# Patient Record
Sex: FEMALE | Race: WHITE | NOT HISPANIC OR LATINO | Employment: FULL TIME | URBAN - METROPOLITAN AREA
[De-identification: names, ages, dates, MRNs, and addresses within clinical notes are randomized per-mention and may not be internally consistent; named-entity substitution may affect disease eponyms.]

---

## 2017-01-04 ENCOUNTER — TELEPHONE (OUTPATIENT)
Dept: INTERNAL MEDICINE | Facility: CLINIC | Age: 52
End: 2017-01-04

## 2017-01-11 ENCOUNTER — TELEPHONE (OUTPATIENT)
Dept: INTERNAL MEDICINE | Facility: CLINIC | Age: 52
End: 2017-01-11

## 2017-01-11 NOTE — TELEPHONE ENCOUNTER
----- Message from Thea Brumfield sent at 1/11/2017  8:13 AM EST -----  Concerning referrals for patient for gyn, derm and colonoscopy. I have been unable to contact pt concerning this to schedule any of these. I have left numerous voice mails and mailed letters. Are these referrals okay to cancel until pt contacts us back?

## 2017-06-05 ENCOUNTER — OFFICE VISIT (OUTPATIENT)
Dept: INTERNAL MEDICINE | Facility: CLINIC | Age: 52
End: 2017-06-05

## 2017-06-05 VITALS
DIASTOLIC BLOOD PRESSURE: 80 MMHG | RESPIRATION RATE: 16 BRPM | SYSTOLIC BLOOD PRESSURE: 122 MMHG | BODY MASS INDEX: 37.73 KG/M2 | HEART RATE: 72 BPM | TEMPERATURE: 97.7 F | WEIGHT: 190 LBS

## 2017-06-05 DIAGNOSIS — Z00.00 HEALTH CARE MAINTENANCE: ICD-10-CM

## 2017-06-05 DIAGNOSIS — C44.90 NONMELANOMA SKIN CANCER: ICD-10-CM

## 2017-06-05 DIAGNOSIS — Z12.11 SCREENING FOR COLON CANCER: ICD-10-CM

## 2017-06-05 DIAGNOSIS — E66.9 OBESITY, UNSPECIFIED OBESITY SEVERITY, UNSPECIFIED OBESITY TYPE: ICD-10-CM

## 2017-06-05 DIAGNOSIS — G47.33 OSA (OBSTRUCTIVE SLEEP APNEA): ICD-10-CM

## 2017-06-05 DIAGNOSIS — Z12.31 ENCOUNTER FOR SCREENING MAMMOGRAM FOR BREAST CANCER: ICD-10-CM

## 2017-06-05 DIAGNOSIS — I10 ESSENTIAL HYPERTENSION: ICD-10-CM

## 2017-06-05 DIAGNOSIS — R06.83 SNORING: ICD-10-CM

## 2017-06-05 DIAGNOSIS — N91.2 AMENORRHEA: ICD-10-CM

## 2017-06-05 DIAGNOSIS — Z00.00 ANNUAL PHYSICAL EXAM: Primary | ICD-10-CM

## 2017-06-05 DIAGNOSIS — R51.9 HEADACHE, UNSPECIFIED HEADACHE TYPE: ICD-10-CM

## 2017-06-05 LAB
ALBUMIN SERPL-MCNC: 4.2 G/DL (ref 3.2–4.8)
ALBUMIN/GLOB SERPL: 1.4 G/DL (ref 1.5–2.5)
ALP SERPL-CCNC: 92 U/L (ref 25–100)
ALT SERPL W P-5'-P-CCNC: 45 U/L (ref 7–40)
ANION GAP SERPL CALCULATED.3IONS-SCNC: 3 MMOL/L (ref 3–11)
ARTICHOKE IGE QN: 101 MG/DL (ref 0–130)
AST SERPL-CCNC: 27 U/L (ref 0–33)
BASOPHILS # BLD AUTO: 0.04 10*3/MM3 (ref 0–0.2)
BASOPHILS NFR BLD AUTO: 0.5 % (ref 0–1)
BILIRUB BLD-MCNC: NEGATIVE MG/DL
BILIRUB SERPL-MCNC: 0.6 MG/DL (ref 0.3–1.2)
BUN BLD-MCNC: 10 MG/DL (ref 9–23)
BUN/CREAT SERPL: 16.7 (ref 7–25)
CALCIUM SPEC-SCNC: 9.6 MG/DL (ref 8.7–10.4)
CHLORIDE SERPL-SCNC: 107 MMOL/L (ref 99–109)
CHOLEST SERPL-MCNC: 177 MG/DL (ref 0–200)
CLARITY, POC: CLEAR
CO2 SERPL-SCNC: 32 MMOL/L (ref 20–31)
COLOR UR: YELLOW
CREAT BLD-MCNC: 0.6 MG/DL (ref 0.6–1.3)
DEPRECATED RDW RBC AUTO: 43.4 FL (ref 37–54)
EOSINOPHIL # BLD AUTO: 0.12 10*3/MM3 (ref 0.1–0.3)
EOSINOPHIL NFR BLD AUTO: 1.5 % (ref 0–3)
ERYTHROCYTE [DISTWIDTH] IN BLOOD BY AUTOMATED COUNT: 13.3 % (ref 11.3–14.5)
EXPIRATION DATE: NORMAL
GFR SERPL CREATININE-BSD FRML MDRD: 105 ML/MIN/1.73
GLOBULIN UR ELPH-MCNC: 2.9 GM/DL
GLUCOSE BLD-MCNC: 85 MG/DL (ref 70–100)
GLUCOSE UR STRIP-MCNC: NEGATIVE MG/DL
HCT VFR BLD AUTO: 44.4 % (ref 34.5–44)
HDLC SERPL-MCNC: 52 MG/DL (ref 40–60)
HGB BLD-MCNC: 15 G/DL (ref 11.5–15.5)
IMM GRANULOCYTES # BLD: 0.01 10*3/MM3 (ref 0–0.03)
IMM GRANULOCYTES NFR BLD: 0.1 % (ref 0–0.6)
KETONES UR QL: NEGATIVE
LEUKOCYTE EST, POC: NEGATIVE
LYMPHOCYTES # BLD AUTO: 2.58 10*3/MM3 (ref 0.6–4.8)
LYMPHOCYTES NFR BLD AUTO: 32.8 % (ref 24–44)
Lab: NORMAL
MCH RBC QN AUTO: 30.4 PG (ref 27–31)
MCHC RBC AUTO-ENTMCNC: 33.8 G/DL (ref 32–36)
MCV RBC AUTO: 89.9 FL (ref 80–99)
MONOCYTES # BLD AUTO: 0.65 10*3/MM3 (ref 0–1)
MONOCYTES NFR BLD AUTO: 8.3 % (ref 0–12)
NEUTROPHILS # BLD AUTO: 4.46 10*3/MM3 (ref 1.5–8.3)
NEUTROPHILS NFR BLD AUTO: 56.8 % (ref 41–71)
NITRITE UR-MCNC: NEGATIVE MG/ML
PH UR: 7 [PH] (ref 5–8)
PLATELET # BLD AUTO: 250 10*3/MM3 (ref 150–450)
PMV BLD AUTO: 9.6 FL (ref 6–12)
POTASSIUM BLD-SCNC: 3.7 MMOL/L (ref 3.5–5.5)
PROT SERPL-MCNC: 7.1 G/DL (ref 5.7–8.2)
PROT UR STRIP-MCNC: NEGATIVE MG/DL
RBC # BLD AUTO: 4.94 10*6/MM3 (ref 3.89–5.14)
RBC # UR STRIP: NEGATIVE /UL
SODIUM BLD-SCNC: 142 MMOL/L (ref 132–146)
SP GR UR: 1 (ref 1–1.03)
TRIGL SERPL-MCNC: 105 MG/DL (ref 0–150)
UROBILINOGEN UR QL: NORMAL
WBC NRBC COR # BLD: 7.86 10*3/MM3 (ref 3.5–10.8)

## 2017-06-05 PROCEDURE — 85025 COMPLETE CBC W/AUTO DIFF WBC: CPT | Performed by: NURSE PRACTITIONER

## 2017-06-05 PROCEDURE — 81003 URINALYSIS AUTO W/O SCOPE: CPT | Performed by: NURSE PRACTITIONER

## 2017-06-05 PROCEDURE — 99396 PREV VISIT EST AGE 40-64: CPT | Performed by: NURSE PRACTITIONER

## 2017-06-05 PROCEDURE — 80061 LIPID PANEL: CPT | Performed by: NURSE PRACTITIONER

## 2017-06-05 PROCEDURE — 80053 COMPREHEN METABOLIC PANEL: CPT | Performed by: NURSE PRACTITIONER

## 2017-06-05 PROCEDURE — 36415 COLL VENOUS BLD VENIPUNCTURE: CPT | Performed by: NURSE PRACTITIONER

## 2017-06-05 RX ORDER — LOSARTAN POTASSIUM AND HYDROCHLOROTHIAZIDE 12.5; 5 MG/1; MG/1
1 TABLET ORAL DAILY
Qty: 30 TABLET | Refills: 5 | Status: SHIPPED | OUTPATIENT
Start: 2017-06-05 | End: 2018-01-21 | Stop reason: SDUPTHER

## 2017-06-05 NOTE — PATIENT INSTRUCTIONS
MyPlate from [employer]  The general, healthful diet is based on the 2010 Dietary Guidelines for Americans. The amount of food you need to eat from each food group depends on your age, sex, and level of physical activity and can be individualized by a dietitian. Go to ChooseMyPlate.gov for more information.  WHAT DO I NEED TO KNOW ABOUT THE MYPLATE PLAN?  · Enjoy your food, but eat less.    · Avoid oversized portions.      ½ of your plate should include fruits and vegetables.    ¼ of your plate should be grains.    ¼ of your plate should be protein.  Grains  · Make at least half of your grains whole grains.  · For a 2,000 calorie daily food plan, eat 6 oz every day.  · 1 oz is about 1 slice bread, 1 cup cereal, or ½ cup cooked rice, cereal, or pasta.  Vegetables  · Make half your plate fruits and vegetables.  · For a 2,000 calorie daily food plan, eat 2½ cups every day.  · 1 cup is about 1 cup raw or cooked vegetables or vegetable juice or 2 cups raw leafy greens.  Fruits  · Make half your plate fruits and vegetables.  · For a 2,000 calorie daily food plan, eat 2 cups every day.  · 1 cup is about 1 cup fruit or 100% fruit juice or ½ cup dried fruit.  Protein  · For a 2,000 calorie daily food plan, eat 5½ oz every day.  · 1 oz is about 1 oz meat, poultry, or fish, ¼ cup cooked beans, 1 egg, 1 Tbsp peanut butter, or ½ oz nuts or seeds.  Dairy  · Switch to fat-free or low-fat (1%) milk.  · For a 2,000 calorie daily food plan, eat 3 cups every day.  · 1 cup is about 1 cup milk or yogurt or soy milk (soy beverage), 1½ oz natural cheese, or 2 oz processed cheese.  Fats, Oils, and Empty Calories  · Only small amounts of oils are recommended.  · Empty calories are calories from solid fats or added sugars.  · Compare sodium in foods like soup, bread, and frozen meals. Choose the foods with lower numbers.  · Drink water instead of sugary drinks.  WHAT FOODS CAN I EAT?  Grains  Whole grains such as whole wheat, quinoa, millet, and  bulgur. Bread, rolls, and pasta made from whole grains. Brown or wild rice. Hot or cold cereals made from whole grains and without added sugar.  Vegetables  All fresh vegetables, especially fresh red, dark green, or orange vegetables. Peas and beans. Low-sodium frozen or canned vegetables prepared without added salt. Low-sodium vegetable juices.  Fruits  All fresh, frozen, and dried fruits. Canned fruit packed in water or fruit juice without added sugar. Fruit juices without added sugar.  Meats and Other Protein Sources  Boiled, baked, or grilled lean meat trimmed of fat. Skinless poultry. Fresh seafood and shellfish. Canned seafood packed in water. Unsalted nuts and unsalted nut butters. Tofu. Dried beans and pea. Eggs.  Dairy  Low-fat or fat-free milk, yogurt, and cheeses.   Sweets and Desserts  Frozen desserts made from low-fat milk.  Fats and Oils  Olive, peanut, and canola oils and margarine. Salad dressing and mayonnaise made from these oils.  Other  Soups and casseroles made from allowed ingredients and without added fat or salt.  The items listed above may not be a complete list of recommended foods or beverages. Contact your dietitian for more options.   WHAT FOODS ARE NOT RECOMMENDED?  Grains  Sweetened, low-fiber cereals. Packaged baked goods. Snack crackers and chips. Cheese crackers, butter crackers, and biscuits. Frozen waffles, sweet breads, doughnuts, pastries, packaged baking mixes, pancakes, cakes, and cookies.  Vegetables  Regular canned or frozen vegetables or vegetables prepared with salt. Canned tomatoes. Canned tomato sauce. Fried vegetables. Vegetables in cream sauce or cheese sauce.  Fruits  Fruits packed in syrup or made with added sugar.   Meats and Other Protein Sources  Marbled or fatty meats such as ribs. Poultry with skin. Fried meats, poultry, eggs, or fish. Sausages, hot dogs, and deli meats such as pastrami, bologna, or salami.  Dairy  Whole milk, cream, cheeses made from whole  "milk, sour cream. Ice cream or yogurt made from whole milk or with added sugar.  Beverages  For adults, no more than one alcoholic drink per day. Regular soft drinks or other sugary beverages. Juice drinks.  Sweets and Desserts  Sugary or fatty desserts, candy, and other sweets.  Fats and Oils  Solid shortening or partially hydrogenated oils. Solid margarine. Margarine that contains trans fats. Butter.  The items listed above may not be a complete list of foods and beverages to avoid. Contact your dietitian for more information.     This information is not intended to replace advice given to you by your health care provider. Make sure you discuss any questions you have with your health care provider.     Document Released: 01/06/2009 Document Revised: 01/08/2016 Document Reviewed: 11/26/2014  [employer] Interactive Patient Education ©2017 [employer]      DASH Eating Plan  DASH stands for \"Dietary Approaches to Stop Hypertension.\" The DASH eating plan is a healthy eating plan that has been shown to reduce high blood pressure (hypertension). Additional health benefits may include reducing the risk of type 2 diabetes mellitus, heart disease, and stroke. The DASH eating plan may also help with weight loss.  WHAT DO I NEED TO KNOW ABOUT THE DASH EATING PLAN?  For the DASH eating plan, you will follow these general guidelines:  · Choose foods with less than 150 milligrams of sodium per serving (as listed on the food label).  · Use salt-free seasonings or herbs instead of table salt or sea salt.  · Check with your health care provider or pharmacist before using salt substitutes.  · Eat lower-sodium products. These are often labeled as \"low-sodium\" or \"no salt added.\"  · Eat fresh foods. Avoid eating a lot of canned foods.  · Eat more vegetables, fruits, and low-fat dairy products.  · Choose whole grains. Look for the word \"whole\" as the first word in the ingredient list.  · Choose fish and skinless chicken or turkey more " often than red meat. Limit fish, poultry, and meat to 6 oz (170 g) each day.  · Limit sweets, desserts, sugars, and sugary drinks.  · Choose heart-healthy fats.  · Eat more home-cooked food and less restaurant, buffet, and fast food.  · Limit fried foods.  · Do not bonilla foods. Cook foods using methods such as baking, boiling, grilling, and broiling instead.  · When eating at a restaurant, ask that your food be prepared with less salt, or no salt if possible.  WHAT FOODS CAN I EAT?  Seek help from a dietitian for individual calorie needs.  Grains  Whole grain or whole wheat bread. Brown rice. Whole grain or whole wheat pasta. Quinoa, bulgur, and whole grain cereals. Low-sodium cereals. Corn or whole wheat flour tortillas. Whole grain cornbread. Whole grain crackers. Low-sodium crackers.  Vegetables  Fresh or frozen vegetables (raw, steamed, roasted, or grilled). Low-sodium or reduced-sodium tomato and vegetable juices. Low-sodium or reduced-sodium tomato sauce and paste. Low-sodium or reduced-sodium canned vegetables.   Fruits  All fresh, canned (in natural juice), or frozen fruits.  Meat and Other Protein Products  Ground beef (85% or leaner), grass-fed beef, or beef trimmed of fat. Skinless chicken or turkey. Ground chicken or turkey. Pork trimmed of fat. All fish and seafood. Eggs. Dried beans, peas, or lentils. Unsalted nuts and seeds. Unsalted canned beans.  Dairy  Low-fat dairy products, such as skim or 1% milk, 2% or reduced-fat cheeses, low-fat ricotta or cottage cheese, or plain low-fat yogurt. Low-sodium or reduced-sodium cheeses.  Fats and Oils  Tub margarines without trans fats. Light or reduced-fat mayonnaise and salad dressings (reduced sodium). Avocado. Safflower, olive, or canola oils. Natural peanut or almond butter.  Other  Unsalted popcorn and pretzels.  The items listed above may not be a complete list of recommended foods or beverages. Contact your dietitian for more options.  WHAT FOODS ARE NOT  RECOMMENDED?  Grains  White bread. White pasta. White rice. Refined cornbread. Bagels and croissants. Crackers that contain trans fat.  Vegetables  Creamed or fried vegetables. Vegetables in a cheese sauce. Regular canned vegetables. Regular canned tomato sauce and paste. Regular tomato and vegetable juices.  Fruits  Canned fruit in light or heavy syrup. Fruit juice.  Meat and Other Protein Products  Fatty cuts of meat. Ribs, chicken wings, davila, sausage, bologna, salami, chitterlings, fatback, hot dogs, bratwurst, and packaged luncheon meats. Salted nuts and seeds. Canned beans with salt.  Dairy  Whole or 2% milk, cream, half-and-half, and cream cheese. Whole-fat or sweetened yogurt. Full-fat cheeses or blue cheese. Nondairy creamers and whipped toppings. Processed cheese, cheese spreads, or cheese curds.  Condiments  Onion and garlic salt, seasoned salt, table salt, and sea salt. Canned and packaged gravies. Worcestershire sauce. Tartar sauce. Barbecue sauce. Teriyaki sauce. Soy sauce, including reduced sodium. Steak sauce. Fish sauce. Oyster sauce. Cocktail sauce. Horseradish. Ketchup and mustard. Meat flavorings and tenderizers. Bouillon cubes. Hot sauce. Tabasco sauce. Marinades. Taco seasonings. Relishes.  Fats and Oils  Butter, stick margarine, lard, shortening, ghee, and davila fat. Coconut, palm kernel, or palm oils. Regular salad dressings.  Other  Pickles and olives. Salted popcorn and pretzels.  The items listed above may not be a complete list of foods and beverages to avoid. Contact your dietitian for more information.  WHERE CAN I FIND MORE INFORMATION?  [employer] [employer] [facility] [place]: www.nhlbi.nih.gov/health/health-topics/topics/dash/     This information is not intended to replace advice given to you by your health care provider. Make sure you discuss any questions you have with your health care provider.     Document Released: 12/06/2012 Document Revised: 04/10/2017 Document Reviewed:  10/22/2014  [employer] Interactive Patient Education ©2017 [employer]vier Inc.

## 2017-06-05 NOTE — PROGRESS NOTES
Chief Complaint   Patient presents with   • Hypertension        Subjective     History of Present Illness     Not watching diet  Joined [employer] recently  Not checking bp  Did not get colon or mamm last visit no reason just did not  Mom had liver melanoma then to eye      The following portions of the patient's history were reviewed and updated as appropriate: allergies, current medications, past family history, past medical history, past social history, past surgical history and problem list.    Review of Systems   Constitutional: Negative for activity change, appetite change, fever and unexpected weight change.   All other systems reviewed and are negative.  Headache dizziness visual changes chest pain shortness of breath swelling no abdominal pain nausea vomiting diarrhea no allergy symptoms such as congestion no difficulty urination bowel movements are daily no arthralgia sleep is stable    Objective   Physical Exam   Constitutional: She appears well-developed and well-nourished.   HENT:   Head: Normocephalic and atraumatic.   Right Ear: External ear normal.   Left Ear: External ear normal.   Nose: Nose normal.   Mouth/Throat: Oropharynx is clear and moist.   Eyes: Conjunctivae and EOM are normal. Pupils are equal, round, and reactive to light. Right eye exhibits no discharge. Left eye exhibits no discharge. No scleral icterus.   Neck: Normal range of motion. Neck supple. No thyromegaly present.   Cardiovascular: Normal rate, regular rhythm, normal heart sounds and intact distal pulses.    No murmur heard.  Pulmonary/Chest: Effort normal and breath sounds normal.   Abdominal: Soft. Bowel sounds are normal. She exhibits no distension and no mass. There is no tenderness. There is no rebound and no guarding. No hernia.   Musculoskeletal: Normal range of motion. She exhibits no edema.   Lymphadenopathy:     She has no cervical adenopathy.   Neurological: She is alert. She has normal reflexes.   Skin: Skin is  warm and dry.   Psychiatric: She has a normal mood and affect. Her behavior is normal.   Nursing note and vitals reviewed.        Current Outpatient Prescriptions:   •  losartan-hydrochlorothiazide (HYZAAR) 50-12.5 MG per tablet, Take 1 tablet by mouth Daily., Disp: 30 tablet, Rfl: 5  •  Multiple Vitamins-Minerals (MULTIVITAMIN ADULT PO), Take  by mouth., Disp: , Rfl:     Assessment/Plan   Natalie was seen today for hypertension.    Diagnoses and all orders for this visit:    Essential hypertension  -     losartan-hydrochlorothiazide (HYZAAR) 50-12.5 MG per tablet; Take 1 tablet by mouth Daily.  -     POC Urinalysis Dipstick, Automated  -     Comprehensive Metabolic Panel    Snoring    SHY (obstructive sleep apnea)    Headache, unspecified headache type    Nonmelanoma skin cancer    Amenorrhea    Health care maintenance    Obesity, unspecified obesity severity, unspecified obesity type  -     Lipid Panel    Encounter for screening mammogram for breast cancer  -     Mammo Screening Digital Tomosynthesis Bilateral With CAD; Future    Screening for colon cancer  -     Ambulatory Referral For Screening Colonoscopy    Annual physical exam  -     CBC & Differential    Other orders  -     Cancel: TSH    no pmh of abnormal paps     lmp 10/16    H/m  Breast colon screenings    Patient education regarding the importance of avoidance of texting while driving and the need for wearing seatbelt.  Use of sunscreen, use of helmets while on bikes.  The appropriate amounts of sleep and H20 consumption per day was reviewed with pt.  The need for healthy well-balanced diet based off the food guide pyramid and avoidance of skipping meals along with following a NCS diet with appropriate amounts of fats, protein, and carbohydrate and low sodium diet. Encouraging 30minutes of cardio 5d weekly and muscle strengthening 3x weekly.     Follow up 6 mo fasting chronic f/u  RTC/call  If symptoms worsen  Meds MOA and SE's reviewed and pt  v/u

## 2017-06-06 ENCOUNTER — TRANSCRIBE ORDERS (OUTPATIENT)
Dept: ADMINISTRATIVE | Facility: HOSPITAL | Age: 52
End: 2017-06-06

## 2017-06-06 DIAGNOSIS — Z12.31 VISIT FOR SCREENING MAMMOGRAM: Primary | ICD-10-CM

## 2017-06-07 ENCOUNTER — HOSPITAL ENCOUNTER (OUTPATIENT)
Dept: MAMMOGRAPHY | Facility: HOSPITAL | Age: 52
Discharge: HOME | End: 2017-06-07
Admitting: NURSE PRACTITIONER

## 2017-06-07 ENCOUNTER — APPOINTMENT (OUTPATIENT)
Dept: OTHER | Facility: HOSPITAL | Age: 52
End: 2017-06-07

## 2017-06-07 DIAGNOSIS — Z92.89 H/O MAMMOGRAM: ICD-10-CM

## 2017-06-07 DIAGNOSIS — Z12.31 ENCOUNTER FOR SCREENING MAMMOGRAM FOR BREAST CANCER: ICD-10-CM

## 2017-06-07 PROCEDURE — 77067 SCR MAMMO BI INCL CAD: CPT | Performed by: RADIOLOGY

## 2017-06-07 PROCEDURE — 77063 BREAST TOMOSYNTHESIS BI: CPT

## 2017-06-07 PROCEDURE — G0202 SCR MAMMO BI INCL CAD: HCPCS

## 2017-06-07 PROCEDURE — 77063 BREAST TOMOSYNTHESIS BI: CPT | Performed by: RADIOLOGY

## 2017-06-30 ENCOUNTER — TELEPHONE (OUTPATIENT)
Dept: INTERNAL MEDICINE | Facility: CLINIC | Age: 52
End: 2017-06-30

## 2017-06-30 NOTE — TELEPHONE ENCOUNTER
----- Message from LISA Gómez sent at 6/21/2017  7:24 AM EDT -----  Pap nml repeat in 3 yr let pt know

## 2017-08-22 ENCOUNTER — TELEPHONE (OUTPATIENT)
Dept: INTERNAL MEDICINE | Facility: CLINIC | Age: 52
End: 2017-08-22

## 2017-12-13 ENCOUNTER — OFFICE VISIT (OUTPATIENT)
Dept: INTERNAL MEDICINE | Facility: CLINIC | Age: 52
End: 2017-12-13

## 2017-12-13 VITALS
BODY MASS INDEX: 36.71 KG/M2 | DIASTOLIC BLOOD PRESSURE: 76 MMHG | SYSTOLIC BLOOD PRESSURE: 112 MMHG | HEIGHT: 60 IN | HEART RATE: 70 BPM | TEMPERATURE: 98.5 F | WEIGHT: 187 LBS

## 2017-12-13 DIAGNOSIS — R06.83 SNORING: ICD-10-CM

## 2017-12-13 DIAGNOSIS — I10 ESSENTIAL HYPERTENSION: Primary | ICD-10-CM

## 2017-12-13 DIAGNOSIS — E66.9 OBESITY (BMI 35.0-39.9 WITHOUT COMORBIDITY): ICD-10-CM

## 2017-12-13 DIAGNOSIS — C44.90 NONMELANOMA SKIN CANCER: ICD-10-CM

## 2017-12-13 DIAGNOSIS — J40 BRONCHITIS: ICD-10-CM

## 2017-12-13 DIAGNOSIS — N91.2 AMENORRHEA: ICD-10-CM

## 2017-12-13 DIAGNOSIS — G89.29 CHRONIC INTRACTABLE HEADACHE, UNSPECIFIED HEADACHE TYPE: ICD-10-CM

## 2017-12-13 DIAGNOSIS — R51.9 CHRONIC INTRACTABLE HEADACHE, UNSPECIFIED HEADACHE TYPE: ICD-10-CM

## 2017-12-13 DIAGNOSIS — G47.33 OSA (OBSTRUCTIVE SLEEP APNEA): ICD-10-CM

## 2017-12-13 LAB
ALBUMIN SERPL-MCNC: 4.2 G/DL (ref 3.2–4.8)
ALBUMIN/GLOB SERPL: 1.6 G/DL (ref 1.5–2.5)
ALP SERPL-CCNC: 100 U/L (ref 25–100)
ALT SERPL W P-5'-P-CCNC: 43 U/L (ref 7–40)
ANION GAP SERPL CALCULATED.3IONS-SCNC: 5 MMOL/L (ref 3–11)
ARTICHOKE IGE QN: 86 MG/DL (ref 0–130)
AST SERPL-CCNC: 28 U/L (ref 0–33)
BILIRUB BLD-MCNC: NEGATIVE MG/DL
BILIRUB SERPL-MCNC: 0.7 MG/DL (ref 0.3–1.2)
BUN BLD-MCNC: 9 MG/DL (ref 9–23)
BUN/CREAT SERPL: 15 (ref 7–25)
CALCIUM SPEC-SCNC: 9.3 MG/DL (ref 8.7–10.4)
CHLORIDE SERPL-SCNC: 104 MMOL/L (ref 99–109)
CHOLEST SERPL-MCNC: 150 MG/DL (ref 0–200)
CLARITY, POC: CLEAR
CO2 SERPL-SCNC: 30 MMOL/L (ref 20–31)
COLOR UR: YELLOW
CREAT BLD-MCNC: 0.6 MG/DL (ref 0.6–1.3)
DEPRECATED RDW RBC AUTO: 42.1 FL (ref 37–54)
ERYTHROCYTE [DISTWIDTH] IN BLOOD BY AUTOMATED COUNT: 13.1 % (ref 11.3–14.5)
EXPIRATION DATE: NORMAL
GFR SERPL CREATININE-BSD FRML MDRD: 105 ML/MIN/1.73
GLOBULIN UR ELPH-MCNC: 2.6 GM/DL
GLUCOSE BLD-MCNC: 88 MG/DL (ref 70–100)
GLUCOSE UR STRIP-MCNC: NEGATIVE MG/DL
HCT VFR BLD AUTO: 43.8 % (ref 34.5–44)
HDLC SERPL-MCNC: 56 MG/DL (ref 40–60)
HGB BLD-MCNC: 15.2 G/DL (ref 11.5–15.5)
KETONES UR QL: NEGATIVE
LEUKOCYTE EST, POC: NEGATIVE
Lab: NORMAL
MCH RBC QN AUTO: 30.8 PG (ref 27–31)
MCHC RBC AUTO-ENTMCNC: 34.7 G/DL (ref 32–36)
MCV RBC AUTO: 88.7 FL (ref 80–99)
NITRITE UR-MCNC: NEGATIVE MG/ML
PH UR: 8 [PH] (ref 5–8)
PLATELET # BLD AUTO: 241 10*3/MM3 (ref 150–450)
PMV BLD AUTO: 9.1 FL (ref 6–12)
POTASSIUM BLD-SCNC: 3.8 MMOL/L (ref 3.5–5.5)
PROT SERPL-MCNC: 6.8 G/DL (ref 5.7–8.2)
PROT UR STRIP-MCNC: NEGATIVE MG/DL
RBC # BLD AUTO: 4.94 10*6/MM3 (ref 3.89–5.14)
RBC # UR STRIP: NEGATIVE /UL
SODIUM BLD-SCNC: 139 MMOL/L (ref 132–146)
SP GR UR: 1.01 (ref 1–1.03)
TRIGL SERPL-MCNC: 84 MG/DL (ref 0–150)
TSH SERPL DL<=0.05 MIU/L-ACNC: 0.98 MIU/ML (ref 0.35–5.35)
UROBILINOGEN UR QL: NORMAL
WBC NRBC COR # BLD: 7.74 10*3/MM3 (ref 3.5–10.8)

## 2017-12-13 PROCEDURE — 84443 ASSAY THYROID STIM HORMONE: CPT | Performed by: NURSE PRACTITIONER

## 2017-12-13 PROCEDURE — 80061 LIPID PANEL: CPT | Performed by: NURSE PRACTITIONER

## 2017-12-13 PROCEDURE — 99214 OFFICE O/P EST MOD 30 MIN: CPT | Performed by: NURSE PRACTITIONER

## 2017-12-13 PROCEDURE — 81003 URINALYSIS AUTO W/O SCOPE: CPT | Performed by: NURSE PRACTITIONER

## 2017-12-13 PROCEDURE — 85027 COMPLETE CBC AUTOMATED: CPT | Performed by: NURSE PRACTITIONER

## 2017-12-13 PROCEDURE — 36415 COLL VENOUS BLD VENIPUNCTURE: CPT | Performed by: NURSE PRACTITIONER

## 2017-12-13 PROCEDURE — 80053 COMPREHEN METABOLIC PANEL: CPT | Performed by: NURSE PRACTITIONER

## 2017-12-13 RX ORDER — BENZONATATE 100 MG/1
100 CAPSULE ORAL 3 TIMES DAILY PRN
Qty: 30 CAPSULE | Refills: 0 | Status: SHIPPED | OUTPATIENT
Start: 2017-12-13 | End: 2018-03-09

## 2017-12-13 RX ORDER — AZITHROMYCIN 250 MG/1
TABLET, FILM COATED ORAL
Qty: 6 TABLET | Refills: 0 | Status: SHIPPED | OUTPATIENT
Start: 2017-12-13 | End: 2018-03-09

## 2017-12-13 NOTE — PROGRESS NOTES
Chief Complaint   Patient presents with   • Follow-up     6 Month   • Cough     dry x 5 days        Subjective     History of Present Illness   6mo f/u    New problem includes Uri 1 week  sick seen neg flu   otc meds not helping     Migraine med rf sumitriptan  Needs takes 1/2 pills prn infreq  Uri cough 5 d otc meds not helping    yamilet in past- per  reports not snorming not on cpap and taking villeran root    pmh skin cancer    Menopausal one year    No exercise daily and does not follow a healthy diet    The following portions of the patient's history were reviewed and updated as appropriate: allergies, current medications, past family history, past medical history, past social history, past surgical history and problem list.    Review of Systems  No change in appetite or activity no recent fevers chills or arthralgias no sweats positive headache nasal congestion no ear pain postnasal drainage mild cough no chest pain shortness of breath no abdominal pain nausea vomiting diarrhea no changes in bowel bladder habits no new skin changes noted lumps or bumps  Objective   Physical Exam   Constitutional: She is oriented to person, place, and time. She appears well-developed and well-nourished.   HENT:   Head: Normocephalic and atraumatic.   Right Ear: Hearing, tympanic membrane, external ear and ear canal normal.   Left Ear: Hearing, tympanic membrane, external ear and ear canal normal.   Nose: Right sinus exhibits no maxillary sinus tenderness and no frontal sinus tenderness. Left sinus exhibits no maxillary sinus tenderness and no frontal sinus tenderness.   Mouth/Throat: Uvula is midline and mucous membranes are normal.   Moderate clear postnasal drainage Nasal mucosa edematous   Eyes: Conjunctivae are normal. Pupils are equal, round, and reactive to light. Right eye exhibits no discharge. Left eye exhibits no discharge. No scleral icterus.   Neck: Normal range of motion. Neck supple. No thyromegaly  present.   Cardiovascular: Normal rate, regular rhythm, normal heart sounds and intact distal pulses.  Exam reveals no friction rub.    No murmur heard.  Pulmonary/Chest: Effort normal.   Diminished in bases congested cough   Abdominal: Soft. Bowel sounds are normal. She exhibits no distension and no mass. There is no tenderness. There is no rebound and no guarding. No hernia.   Musculoskeletal: Normal range of motion. She exhibits no edema.   Lymphadenopathy:     She has no cervical adenopathy.   Neurological: She is alert and oriented to person, place, and time. She has normal reflexes.   Skin: Skin is warm and dry.   Psychiatric: She has a normal mood and affect. Her behavior is normal. Judgment and thought content normal.   Nursing note and vitals reviewed.            Assessment/Plan   Natalie was seen today for follow-up and cough.    Diagnoses and all orders for this visit:    Essential hypertension  -     Comprehensive Metabolic Panel  -     CBC (No Diff)  -     Lipid Panel  -     TSH  -     POC Urinalysis Dipstick, Automated    Snoring  -     Comprehensive Metabolic Panel  -     CBC (No Diff)  -     Lipid Panel  -     TSH  -     POC Urinalysis Dipstick, Automated    SHY (obstructive sleep apnea)  -     Comprehensive Metabolic Panel  -     CBC (No Diff)  -     Lipid Panel  -     TSH  -     POC Urinalysis Dipstick, Automated    Nonmelanoma skin cancer  -     Comprehensive Metabolic Panel  -     CBC (No Diff)  -     Lipid Panel  -     TSH  -     POC Urinalysis Dipstick, Automated    Chronic intractable headache, unspecified headache type  -     Comprehensive Metabolic Panel  -     CBC (No Diff)  -     Lipid Panel  -     TSH  -     POC Urinalysis Dipstick, Automated    Amenorrhea  -     Comprehensive Metabolic Panel  -     CBC (No Diff)  -     Lipid Panel  -     TSH  -     POC Urinalysis Dipstick, Automated    Bronchitis  -     azithromycin (ZITHROMAX Z-LUIS) 250 MG tablet; Take 2 tablets the first day, then 1  tablet daily for 4 days.  -     benzonatate (TESSALON PERLES) 100 MG capsule; Take 1 capsule by mouth 3 (Three) Times a Day As Needed for Cough.    Obesity (BMI 35.0-39.9 without comorbidity)      Counseling of diet in reagards to appropriate amounts of fats, carbs, sodium and well -balanced diet.  Avoid skipping meals, appropriate amounts of h20 intake, sleep, and regular cardio activity 30 mintues 5d weekly.  The pt v/u.        Return in about 6 months (around 6/13/2018) for print summary, Annual, fasting.  RTC/call  If symptoms worsen  Meds MOA and SE's reviewed and pt v/u

## 2017-12-13 NOTE — PATIENT INSTRUCTIONS
MyPlate from [employer]  The general, healthful diet is based on the 2010 Dietary Guidelines for Americans. The amount of food you need to eat from each food group depends on your age, sex, and level of physical activity and can be individualized by a dietitian. Go to ChooseMyPlate.gov for more information.  WHAT DO I NEED TO KNOW ABOUT THE MYPLATE PLAN?  · Enjoy your food, but eat less.    · Avoid oversized portions.      ½ of your plate should include fruits and vegetables.    ¼ of your plate should be grains.    ¼ of your plate should be protein.  Grains  · Make at least half of your grains whole grains.  · For a 2,000 calorie daily food plan, eat 6 oz every day.  · 1 oz is about 1 slice bread, 1 cup cereal, or ½ cup cooked rice, cereal, or pasta.  Vegetables  · Make half your plate fruits and vegetables.  · For a 2,000 calorie daily food plan, eat 2½ cups every day.  · 1 cup is about 1 cup raw or cooked vegetables or vegetable juice or 2 cups raw leafy greens.  Fruits  · Make half your plate fruits and vegetables.  · For a 2,000 calorie daily food plan, eat 2 cups every day.  · 1 cup is about 1 cup fruit or 100% fruit juice or ½ cup dried fruit.  Protein  · For a 2,000 calorie daily food plan, eat 5½ oz every day.  · 1 oz is about 1 oz meat, poultry, or fish, ¼ cup cooked beans, 1 egg, 1 Tbsp peanut butter, or ½ oz nuts or seeds.  Dairy  · Switch to fat-free or low-fat (1%) milk.  · For a 2,000 calorie daily food plan, eat 3 cups every day.  · 1 cup is about 1 cup milk or yogurt or soy milk (soy beverage), 1½ oz natural cheese, or 2 oz processed cheese.  Fats, Oils, and Empty Calories  · Only small amounts of oils are recommended.  · Empty calories are calories from solid fats or added sugars.  · Compare sodium in foods like soup, bread, and frozen meals. Choose the foods with lower numbers.  · Drink water instead of sugary drinks.  WHAT FOODS CAN I EAT?  Grains  Whole grains such as whole wheat, quinoa, millet, and  bulgur. Bread, rolls, and pasta made from whole grains. Brown or wild rice. Hot or cold cereals made from whole grains and without added sugar.  Vegetables  All fresh vegetables, especially fresh red, dark green, or orange vegetables. Peas and beans. Low-sodium frozen or canned vegetables prepared without added salt. Low-sodium vegetable juices.  Fruits  All fresh, frozen, and dried fruits. Canned fruit packed in water or fruit juice without added sugar. Fruit juices without added sugar.  Meats and Other Protein Sources  Boiled, baked, or grilled lean meat trimmed of fat. Skinless poultry. Fresh seafood and shellfish. Canned seafood packed in water. Unsalted nuts and unsalted nut butters. Tofu. Dried beans and pea. Eggs.  Dairy  Low-fat or fat-free milk, yogurt, and cheeses.   Sweets and Desserts  Frozen desserts made from low-fat milk.  Fats and Oils  Olive, peanut, and canola oils and margarine. Salad dressing and mayonnaise made from these oils.  Other  Soups and casseroles made from allowed ingredients and without added fat or salt.  The items listed above may not be a complete list of recommended foods or beverages. Contact your dietitian for more options.   WHAT FOODS ARE NOT RECOMMENDED?  Grains  Sweetened, low-fiber cereals. Packaged baked goods. Snack crackers and chips. Cheese crackers, butter crackers, and biscuits. Frozen waffles, sweet breads, doughnuts, pastries, packaged baking mixes, pancakes, cakes, and cookies.  Vegetables  Regular canned or frozen vegetables or vegetables prepared with salt. Canned tomatoes. Canned tomato sauce. Fried vegetables. Vegetables in cream sauce or cheese sauce.  Fruits  Fruits packed in syrup or made with added sugar.   Meats and Other Protein Sources  Marbled or fatty meats such as ribs. Poultry with skin. Fried meats, poultry, eggs, or fish. Sausages, hot dogs, and deli meats such as pastrami, bologna, or salami.  Dairy  Whole milk, cream, cheeses made from whole  milk, sour cream. Ice cream or yogurt made from whole milk or with added sugar.  Beverages  For adults, no more than one alcoholic drink per day. Regular soft drinks or other sugary beverages. Juice drinks.  Sweets and Desserts  Sugary or fatty desserts, candy, and other sweets.  Fats and Oils  Solid shortening or partially hydrogenated oils. Solid margarine. Margarine that contains trans fats. Butter.  The items listed above may not be a complete list of foods and beverages to avoid. Contact your dietitian for more information.     This information is not intended to replace advice given to you by your health care provider. Make sure you discuss any questions you have with your health care provider.     Document Released: 01/06/2009 Document Revised: 01/08/2016 Document Reviewed: 11/26/2014  [employer] Interactive Patient Education ©2017 [employer]

## 2017-12-20 PROBLEM — E66.9 OBESITY (BMI 35.0-39.9 WITHOUT COMORBIDITY): Status: ACTIVE | Noted: 2017-12-20

## 2017-12-28 ENCOUNTER — TELEPHONE (OUTPATIENT)
Dept: INTERNAL MEDICINE | Facility: CLINIC | Age: 52
End: 2017-12-28

## 2017-12-28 RX ORDER — SUMATRIPTAN 100 MG/1
TABLET, FILM COATED ORAL
Qty: 7 TABLET | Refills: 0 | Status: SHIPPED | OUTPATIENT
Start: 2017-12-28 | End: 2022-02-04 | Stop reason: SDUPTHER

## 2017-12-28 RX ORDER — SUMATRIPTAN 100 MG/1
TABLET, FILM COATED ORAL
Qty: 10 TABLET | Refills: 0 | OUTPATIENT
Start: 2017-12-28

## 2017-12-28 NOTE — TELEPHONE ENCOUNTER
----- Message from Valentine Olivas MA sent at 12/28/2017 12:05 PM EST -----  Contact: Natalie  [phone]  She saw Jania on 12/13/2017 and thought she was going to call in a rx for sumatriptan tablets for migranes.  She has this medication perscribed by another provider in the past and they helped.    She checked with the pharmacy and there is not a Rx there.  She uses [address]

## 2017-12-28 NOTE — TELEPHONE ENCOUNTER
Spoke with Pt and she states she takes 100mg prn for migraine. RX sent to [facility]. Pt verbalized much appr'c.

## 2018-01-21 DIAGNOSIS — I10 ESSENTIAL HYPERTENSION: ICD-10-CM

## 2018-01-22 RX ORDER — LOSARTAN POTASSIUM AND HYDROCHLOROTHIAZIDE 12.5; 5 MG/1; MG/1
TABLET ORAL
Qty: 30 TABLET | Refills: 5 | Status: SHIPPED | OUTPATIENT
Start: 2018-01-22 | End: 2018-07-18 | Stop reason: SDUPTHER

## 2018-03-09 ENCOUNTER — OFFICE VISIT (OUTPATIENT)
Dept: INTERNAL MEDICINE | Facility: CLINIC | Age: 53
End: 2018-03-09

## 2018-03-09 VITALS
HEART RATE: 88 BPM | DIASTOLIC BLOOD PRESSURE: 84 MMHG | SYSTOLIC BLOOD PRESSURE: 118 MMHG | TEMPERATURE: 98.6 F | RESPIRATION RATE: 16 BRPM | WEIGHT: 189 LBS | BODY MASS INDEX: 36.61 KG/M2

## 2018-03-09 DIAGNOSIS — H66.41 SUPPURATIVE OTITIS MEDIA OF RIGHT EAR, UNSPECIFIED CHRONICITY: Primary | ICD-10-CM

## 2018-03-09 PROCEDURE — 99214 OFFICE O/P EST MOD 30 MIN: CPT | Performed by: NURSE PRACTITIONER

## 2018-03-09 PROCEDURE — 96372 THER/PROPH/DIAG INJ SC/IM: CPT | Performed by: NURSE PRACTITIONER

## 2018-03-09 RX ORDER — CEFTRIAXONE 2 G/1
2 INJECTION, POWDER, FOR SOLUTION INTRAMUSCULAR; INTRAVENOUS ONCE
Status: COMPLETED | OUTPATIENT
Start: 2018-03-09 | End: 2018-03-09

## 2018-03-09 RX ORDER — AMOXICILLIN AND CLAVULANATE POTASSIUM 875; 125 MG/1; MG/1
1 TABLET, FILM COATED ORAL EVERY 12 HOURS SCHEDULED
Qty: 20 TABLET | Refills: 0 | Status: SHIPPED | OUTPATIENT
Start: 2018-03-09 | End: 2018-03-12

## 2018-03-09 RX ADMIN — CEFTRIAXONE 2 G: 2 INJECTION, POWDER, FOR SOLUTION INTRAMUSCULAR; INTRAVENOUS at 14:15

## 2018-03-09 NOTE — PROGRESS NOTES
Chief Complaint   Patient presents with   • Pain     tender behind right ear, started this morning   • Facial Swelling        Subjective     History of Present Illness     The pt reports bump behind R ear and tender then noted the R face swollen in fornt of her ear and is tender. Feels it has swollen and feels tight when she opens her mouth.   No fever no recent uri symptoms.         The following portions of the patient's history were reviewed and updated as appropriate: allergies, current medications, past family history, past medical history, past social history, past surgical history and problem list.    Review of Systems   HENT: Positive for facial swelling. Negative for ear pain.    All other systems reviewed and are negative.      Objective   Physical Exam   Constitutional: She is oriented to person, place, and time. She appears well-developed and well-nourished.   HENT:   Head: Normocephalic.   Mouth/Throat: No oropharyngeal exudate.   RTM with bulging erythema ttp and post and preauricular swelling warmth LTM without erythema nasal mucosa without erythema edema and posterior pharynx   Eyes: Conjunctivae are normal. Right eye exhibits no discharge. Left eye exhibits no discharge.   Cardiovascular: Normal rate and regular rhythm.    Pulmonary/Chest: Effort normal and breath sounds normal.   Abdominal: Soft. Bowel sounds are normal.   Lymphadenopathy:     She has no cervical adenopathy.   Neurological: She is alert and oriented to person, place, and time.   Skin: Skin is warm and dry.   Psychiatric: She has a normal mood and affect. Her behavior is normal.   Nursing note and vitals reviewed.      Results for orders placed or performed in visit on 12/13/17   Comprehensive Metabolic Panel   Result Value Ref Range    Glucose 88 70 - 100 mg/dL    BUN 9 9 - 23 mg/dL    Creatinine 0.60 0.60 - 1.30 mg/dL    Sodium 139 132 - 146 mmol/L    Potassium 3.8 3.5 - 5.5 mmol/L    Chloride 104 99 - 109 mmol/L    CO2 30.0 20.0  - 31.0 mmol/L    Calcium 9.3 8.7 - 10.4 mg/dL    Total Protein 6.8 5.7 - 8.2 g/dL    Albumin 4.20 3.20 - 4.80 g/dL    ALT (SGPT) 43 (H) 7 - 40 U/L    AST (SGOT) 28 0 - 33 U/L    Alkaline Phosphatase 100 25 - 100 U/L    Total Bilirubin 0.7 0.3 - 1.2 mg/dL    eGFR Non African Amer 105 >60 mL/min/1.73    Globulin 2.6 gm/dL    A/G Ratio 1.6 1.5 - 2.5 g/dL    BUN/Creatinine Ratio 15.0 7.0 - 25.0    Anion Gap 5.0 3.0 - 11.0 mmol/L   CBC (No Diff)   Result Value Ref Range    WBC 7.74 3.50 - 10.80 10*3/mm3    RBC 4.94 3.89 - 5.14 10*6/mm3    Hemoglobin 15.2 11.5 - 15.5 g/dL    Hematocrit 43.8 34.5 - 44.0 %    MCV 88.7 80.0 - 99.0 fL    MCH 30.8 27.0 - 31.0 pg    MCHC 34.7 32.0 - 36.0 g/dL    RDW 13.1 11.3 - 14.5 %    RDW-SD 42.1 37.0 - 54.0 fl    MPV 9.1 6.0 - 12.0 fL    Platelets 241 150 - 450 10*3/mm3   Lipid Panel   Result Value Ref Range    Total Cholesterol 150 0 - 200 mg/dL    Triglycerides 84 0 - 150 mg/dL    HDL Cholesterol 56 40 - 60 mg/dL    LDL Cholesterol  86 0 - 130 mg/dL   TSH   Result Value Ref Range    TSH 0.982 0.350 - 5.350 mIU/mL   POC Urinalysis Dipstick, Automated   Result Value Ref Range    Color Yellow Yellow, Straw, Dark Yellow, Harini    Clarity, UA Clear Clear    Glucose, UA Negative Negative, 1000 mg/dL (3+) mg/dL    Bilirubin Negative Negative    Ketones, UA Negative Negative    Specific Gravity  1.015 1.005 - 1.030    Blood, UA Negative Negative    pH, Urine 8.0 5.0 - 8.0    Protein, POC Negative Negative mg/dL    Urobilinogen, UA Normal Normal    Leukocytes Negative Negative    Nitrite, UA Negative Negative    Lot Number [id number]     Expiration Date 6-30-18         Assessment/Plan   Natalie was seen today for pain and facial swelling.    Diagnoses and all orders for this visit:    Suppurative otitis media of right ear, unspecified chronicity  -     cefTRIAXone (ROCEPHIN) injection 2 g; Inject 2 g into the shoulder, thigh, or buttocks 1 (One) Time.    Other orders  -     Discontinue:  amoxicillin-clavulanate (AUGMENTIN) 875-125 MG per tablet; Take 1 tablet by mouth Every 12 (Twelve) Hours.      Return if symptoms worsen or fail to improve.  RTC/call  If symptoms worsen  Meds MOA and SE's reviewed and pt v/u

## 2018-03-12 ENCOUNTER — TELEPHONE (OUTPATIENT)
Dept: INTERNAL MEDICINE | Facility: CLINIC | Age: 53
End: 2018-03-12

## 2018-03-12 RX ORDER — CEFPROZIL 500 MG/1
500 TABLET, FILM COATED ORAL 2 TIMES DAILY
Qty: 20 TABLET | Refills: 0 | Status: SHIPPED | OUTPATIENT
Start: 2018-03-12 | End: 2018-07-19

## 2018-03-12 NOTE — TELEPHONE ENCOUNTER
----- Message from Noelle Strange V sent at 3/12/2018  9:44 AM EDT -----  PT CALLED STATING THAT SHE IS FEELING BETTER BUT MEDS THAT WERE GIVEN TO HER ARE GIVING HER DIARRHEA    SHE CAN BE REACHED -[phone]

## 2018-07-18 DIAGNOSIS — I10 ESSENTIAL HYPERTENSION: ICD-10-CM

## 2018-07-18 RX ORDER — LOSARTAN POTASSIUM AND HYDROCHLOROTHIAZIDE 12.5; 5 MG/1; MG/1
TABLET ORAL
Qty: 30 TABLET | Refills: 0 | Status: SHIPPED | OUTPATIENT
Start: 2018-07-18 | End: 2018-07-19 | Stop reason: SDUPTHER

## 2018-07-19 ENCOUNTER — OFFICE VISIT (OUTPATIENT)
Dept: INTERNAL MEDICINE | Facility: CLINIC | Age: 53
End: 2018-07-19

## 2018-07-19 VITALS
SYSTOLIC BLOOD PRESSURE: 128 MMHG | HEART RATE: 68 BPM | BODY MASS INDEX: 37.34 KG/M2 | TEMPERATURE: 97.2 F | RESPIRATION RATE: 20 BRPM | WEIGHT: 192.8 LBS | DIASTOLIC BLOOD PRESSURE: 80 MMHG

## 2018-07-19 DIAGNOSIS — R51.9 CHRONIC INTRACTABLE HEADACHE, UNSPECIFIED HEADACHE TYPE: ICD-10-CM

## 2018-07-19 DIAGNOSIS — Z00.00 HEALTH CARE MAINTENANCE: ICD-10-CM

## 2018-07-19 DIAGNOSIS — Z12.11 COLON CANCER SCREENING: ICD-10-CM

## 2018-07-19 DIAGNOSIS — G47.33 OSA (OBSTRUCTIVE SLEEP APNEA): ICD-10-CM

## 2018-07-19 DIAGNOSIS — R06.83 SNORING: ICD-10-CM

## 2018-07-19 DIAGNOSIS — C44.90 NONMELANOMA SKIN CANCER: ICD-10-CM

## 2018-07-19 DIAGNOSIS — E66.9 OBESITY (BMI 35.0-39.9 WITHOUT COMORBIDITY): ICD-10-CM

## 2018-07-19 DIAGNOSIS — G89.29 CHRONIC INTRACTABLE HEADACHE, UNSPECIFIED HEADACHE TYPE: ICD-10-CM

## 2018-07-19 DIAGNOSIS — I10 ESSENTIAL HYPERTENSION: Primary | ICD-10-CM

## 2018-07-19 DIAGNOSIS — R51.9 DAILY HEADACHE: ICD-10-CM

## 2018-07-19 LAB
ALBUMIN SERPL-MCNC: 4.05 G/DL (ref 3.2–4.8)
ALBUMIN/GLOB SERPL: 1.6 G/DL (ref 1.5–2.5)
ALP SERPL-CCNC: 74 U/L (ref 25–100)
ALT SERPL W P-5'-P-CCNC: 43 U/L (ref 7–40)
ANION GAP SERPL CALCULATED.3IONS-SCNC: 6 MMOL/L (ref 3–11)
ARTICHOKE IGE QN: 104 MG/DL (ref 0–130)
AST SERPL-CCNC: 30 U/L (ref 0–33)
BILIRUB BLD-MCNC: NEGATIVE MG/DL
BILIRUB SERPL-MCNC: 0.8 MG/DL (ref 0.3–1.2)
BUN BLD-MCNC: 12 MG/DL (ref 9–23)
BUN/CREAT SERPL: 16 (ref 7–25)
CALCIUM SPEC-SCNC: 8.9 MG/DL (ref 8.7–10.4)
CHLORIDE SERPL-SCNC: 105 MMOL/L (ref 99–109)
CHOLEST SERPL-MCNC: 169 MG/DL (ref 0–200)
CLARITY, POC: CLEAR
CO2 SERPL-SCNC: 29 MMOL/L (ref 20–31)
COLOR UR: YELLOW
CREAT BLD-MCNC: 0.75 MG/DL (ref 0.6–1.3)
EXPIRATION DATE: NORMAL
GFR SERPL CREATININE-BSD FRML MDRD: 81 ML/MIN/1.73
GLOBULIN UR ELPH-MCNC: 2.6 GM/DL
GLUCOSE BLD-MCNC: 87 MG/DL (ref 70–100)
GLUCOSE UR STRIP-MCNC: NEGATIVE MG/DL
HDLC SERPL-MCNC: 55 MG/DL (ref 40–60)
KETONES UR QL: NEGATIVE
LEUKOCYTE EST, POC: NEGATIVE
Lab: NORMAL
NITRITE UR-MCNC: NEGATIVE MG/ML
PH UR: 8 [PH] (ref 5–8)
POTASSIUM BLD-SCNC: 3.9 MMOL/L (ref 3.5–5.5)
PROT SERPL-MCNC: 6.6 G/DL (ref 5.7–8.2)
PROT UR STRIP-MCNC: NEGATIVE MG/DL
RBC # UR STRIP: NEGATIVE /UL
SODIUM BLD-SCNC: 140 MMOL/L (ref 132–146)
SP GR UR: 1 (ref 1–1.03)
TRIGL SERPL-MCNC: 103 MG/DL (ref 0–150)
UROBILINOGEN UR QL: NORMAL

## 2018-07-19 PROCEDURE — 80061 LIPID PANEL: CPT | Performed by: NURSE PRACTITIONER

## 2018-07-19 PROCEDURE — 80053 COMPREHEN METABOLIC PANEL: CPT | Performed by: NURSE PRACTITIONER

## 2018-07-19 PROCEDURE — 36415 COLL VENOUS BLD VENIPUNCTURE: CPT | Performed by: NURSE PRACTITIONER

## 2018-07-19 PROCEDURE — 99214 OFFICE O/P EST MOD 30 MIN: CPT | Performed by: NURSE PRACTITIONER

## 2018-07-19 PROCEDURE — 81003 URINALYSIS AUTO W/O SCOPE: CPT | Performed by: NURSE PRACTITIONER

## 2018-07-19 RX ORDER — LOSARTAN POTASSIUM AND HYDROCHLOROTHIAZIDE 12.5; 5 MG/1; MG/1
1 TABLET ORAL DAILY
Qty: 30 TABLET | Refills: 4 | Status: SHIPPED | OUTPATIENT
Start: 2018-07-19 | End: 2019-02-05 | Stop reason: SDUPTHER

## 2018-07-19 NOTE — PROGRESS NOTES
Chief Complaint   Patient presents with   • Hypertension     follow up        Subjective     History of Present Illness       The pt is here today for fasting f/u.    THe pt has HTN bp stable today    The pt has SHY years ago and had cpap and difficult time using it and now not snoring with mattress change+++update prob list    The pt has obesity up 3#    The pt has HA and neck pain and tightness in shoulder seeing chiropractor as PT did not help her.     The pt has history skin cancer.  Has not seen derm.     The following portions of the patient's history were reviewed and updated as appropriate: allergies, current medications, past family history, past medical history, past social history, past surgical history and problem list.    Review of Systems   Constitutional: Negative for activity change, appetite change, chills, fatigue and fever.   HENT: Negative for congestion, postnasal drip and sore throat.    Eyes: Negative for visual disturbance.   Respiratory: Negative for cough and shortness of breath.    Cardiovascular: Negative for chest pain, palpitations and leg swelling.   Gastrointestinal: Negative for abdominal pain, constipation, diarrhea, nausea and GERD.   Musculoskeletal: Negative for arthralgias and myalgias.   Skin: Negative for rash.   Allergic/Immunologic: Negative for environmental allergies.   Neurological: Negative for dizziness.   Psychiatric/Behavioral: Negative for sleep disturbance.   All other systems reviewed and are negative.      Objective   Physical Exam   Constitutional: She is oriented to person, place, and time. She appears well-developed and well-nourished.   HENT:   Head: Normocephalic and atraumatic.   Right Ear: External ear normal.   Left Ear: External ear normal.   Nose: Nose normal.   Mouth/Throat: Oropharynx is clear and moist.   Neck: No thyromegaly present.   Cardiovascular: Normal rate, regular rhythm and intact distal pulses.    Pulmonary/Chest: Effort normal and breath  sounds normal.   Abdominal: Soft. Bowel sounds are normal. She exhibits no distension. There is no tenderness.   Musculoskeletal: She exhibits no edema.   Lymphadenopathy:     She has no cervical adenopathy.   Neurological: She is alert and oriented to person, place, and time.   Skin: Capillary refill takes 2 to 3 seconds.   Psychiatric: She has a normal mood and affect. Her behavior is normal.   Nursing note and vitals reviewed.         Assessment/Plan   Natalie was seen today for hypertension.    Diagnoses and all orders for this visit:    Essential hypertension  -     Comprehensive Metabolic Panel  -     POC Urinalysis Dipstick, Automated  -     losartan-hydrochlorothiazide (HYZAAR) 50-12.5 MG per tablet; Take 1 tablet by mouth Daily.    Snoring    SHY (obstructive sleep apnea)    Obesity (BMI 35.0-39.9 without comorbidity)  -     Lipid Panel    Chronic intractable headache, unspecified headache type    Nonmelanoma skin cancer    Daily headache    Health care maintenance    Colon cancer screening  -     Cologuard - Stool, Per Rectum; Future    Other orders  -     Cancel: CBC & Differential  -     Cancel: Ambulatory Referral For Screening Colonoscopy    pap last 2016 3 yr repeat  History of SHY per patient she no longer snores so she is changed her mattress.  Does not check blood pressures at home.  Headaches that were daily have been unchanged and would like to see chiropractor as this is helped in the past..  She is unwilling at this point to do colonoscopy.  We will attempt patient to obtain cold guard and occurs dermatology consult due to her history of skin cancer.  Encouraged healthy diet and weight loss and exercise.    Return in about 6 months (around 1/19/2019) for Annual, fasting.  RTC/call  If symptoms worsen  Meds MOA and SE's reviewed and pt v/u

## 2019-02-05 DIAGNOSIS — I10 ESSENTIAL HYPERTENSION: ICD-10-CM

## 2019-02-05 RX ORDER — LOSARTAN POTASSIUM AND HYDROCHLOROTHIAZIDE 12.5; 5 MG/1; MG/1
1 TABLET ORAL DAILY
Qty: 90 TABLET | Refills: 0 | OUTPATIENT
Start: 2019-02-05

## 2019-02-05 RX ORDER — LOSARTAN POTASSIUM AND HYDROCHLOROTHIAZIDE 12.5; 5 MG/1; MG/1
1 TABLET ORAL DAILY
Qty: 30 TABLET | Refills: 0 | Status: SHIPPED | OUTPATIENT
Start: 2019-02-05 | End: 2019-03-03 | Stop reason: SDUPTHER

## 2019-03-03 DIAGNOSIS — I10 ESSENTIAL HYPERTENSION: ICD-10-CM

## 2019-03-04 RX ORDER — LOSARTAN POTASSIUM AND HYDROCHLOROTHIAZIDE 12.5; 5 MG/1; MG/1
1 TABLET ORAL DAILY
Qty: 30 TABLET | Refills: 0 | Status: SHIPPED | OUTPATIENT
Start: 2019-03-04 | End: 2019-08-08

## 2019-07-23 ENCOUNTER — TELEPHONE (OUTPATIENT)
Dept: INTERNAL MEDICINE | Facility: CLINIC | Age: 54
End: 2019-07-23

## 2019-08-08 ENCOUNTER — OFFICE VISIT (OUTPATIENT)
Dept: INTERNAL MEDICINE | Facility: CLINIC | Age: 54
End: 2019-08-08

## 2019-08-08 VITALS
HEIGHT: 59 IN | BODY MASS INDEX: 37.46 KG/M2 | DIASTOLIC BLOOD PRESSURE: 86 MMHG | OXYGEN SATURATION: 96 % | RESPIRATION RATE: 18 BRPM | SYSTOLIC BLOOD PRESSURE: 132 MMHG | WEIGHT: 185.8 LBS | HEART RATE: 88 BPM | TEMPERATURE: 99.1 F

## 2019-08-08 DIAGNOSIS — I10 ESSENTIAL HYPERTENSION: ICD-10-CM

## 2019-08-08 DIAGNOSIS — Z13.29 SCREENING FOR THYROID DISORDER: ICD-10-CM

## 2019-08-08 DIAGNOSIS — Z12.11 COLON CANCER SCREENING: ICD-10-CM

## 2019-08-08 DIAGNOSIS — Z13.220 SCREENING, LIPID: ICD-10-CM

## 2019-08-08 DIAGNOSIS — Z78.0 POST-MENOPAUSE: ICD-10-CM

## 2019-08-08 DIAGNOSIS — C44.90 NONMELANOMA SKIN CANCER: Primary | ICD-10-CM

## 2019-08-08 DIAGNOSIS — E66.9 OBESITY (BMI 35.0-39.9 WITHOUT COMORBIDITY): ICD-10-CM

## 2019-08-08 DIAGNOSIS — Z12.39 BREAST CANCER SCREENING: ICD-10-CM

## 2019-08-08 DIAGNOSIS — G47.33 OSA (OBSTRUCTIVE SLEEP APNEA): ICD-10-CM

## 2019-08-08 LAB
ALBUMIN SERPL-MCNC: 4.5 G/DL (ref 3.5–5.2)
ALBUMIN/GLOB SERPL: 1.6 G/DL
ALP SERPL-CCNC: 75 U/L (ref 39–117)
ALT SERPL W P-5'-P-CCNC: 32 U/L (ref 1–33)
ANION GAP SERPL CALCULATED.3IONS-SCNC: 8.4 MMOL/L (ref 5–15)
AST SERPL-CCNC: 25 U/L (ref 1–32)
BASOPHILS # BLD AUTO: 0.04 10*3/MM3 (ref 0–0.2)
BASOPHILS NFR BLD AUTO: 0.7 % (ref 0–1.5)
BILIRUB BLD-MCNC: NEGATIVE MG/DL
BILIRUB SERPL-MCNC: 0.8 MG/DL (ref 0.2–1.2)
BUN BLD-MCNC: 11 MG/DL (ref 6–20)
BUN/CREAT SERPL: 14.9 (ref 7–25)
CALCIUM SPEC-SCNC: 9.8 MG/DL (ref 8.6–10.5)
CHLORIDE SERPL-SCNC: 105 MMOL/L (ref 98–107)
CHOLEST SERPL-MCNC: 160 MG/DL (ref 0–200)
CLARITY, POC: CLEAR
CO2 SERPL-SCNC: 28.6 MMOL/L (ref 22–29)
COLOR UR: YELLOW
CREAT BLD-MCNC: 0.74 MG/DL (ref 0.57–1)
DEPRECATED RDW RBC AUTO: 45.9 FL (ref 37–54)
EOSINOPHIL # BLD AUTO: 0.06 10*3/MM3 (ref 0–0.4)
EOSINOPHIL NFR BLD AUTO: 1 % (ref 0.3–6.2)
ERYTHROCYTE [DISTWIDTH] IN BLOOD BY AUTOMATED COUNT: 13.7 % (ref 12.3–15.4)
EXPIRATION DATE: NORMAL
GFR SERPL CREATININE-BSD FRML MDRD: 82 ML/MIN/1.73
GLOBULIN UR ELPH-MCNC: 2.9 GM/DL
GLUCOSE BLD-MCNC: 88 MG/DL (ref 65–99)
GLUCOSE UR STRIP-MCNC: NEGATIVE MG/DL
HCT VFR BLD AUTO: 47.7 % (ref 34–46.6)
HDLC SERPL-MCNC: 62 MG/DL (ref 40–60)
HGB BLD-MCNC: 15.4 G/DL (ref 12–15.9)
IMM GRANULOCYTES # BLD AUTO: 0.01 10*3/MM3 (ref 0–0.05)
IMM GRANULOCYTES NFR BLD AUTO: 0.2 % (ref 0–0.5)
KETONES UR QL: NEGATIVE
LDLC SERPL CALC-MCNC: 84 MG/DL (ref 0–100)
LDLC/HDLC SERPL: 1.36 {RATIO}
LEUKOCYTE EST, POC: NEGATIVE
LYMPHOCYTES # BLD AUTO: 1.38 10*3/MM3 (ref 0.7–3.1)
LYMPHOCYTES NFR BLD AUTO: 23 % (ref 19.6–45.3)
Lab: NORMAL
MCH RBC QN AUTO: 29.8 PG (ref 26.6–33)
MCHC RBC AUTO-ENTMCNC: 32.3 G/DL (ref 31.5–35.7)
MCV RBC AUTO: 92.4 FL (ref 79–97)
MONOCYTES # BLD AUTO: 0.6 10*3/MM3 (ref 0.1–0.9)
MONOCYTES NFR BLD AUTO: 10 % (ref 5–12)
NEUTROPHILS # BLD AUTO: 3.91 10*3/MM3 (ref 1.7–7)
NEUTROPHILS NFR BLD AUTO: 65.1 % (ref 42.7–76)
NITRITE UR-MCNC: NEGATIVE MG/ML
NRBC BLD AUTO-RTO: 0 /100 WBC (ref 0–0.2)
PH UR: 8 [PH] (ref 5–8)
PLATELET # BLD AUTO: 258 10*3/MM3 (ref 140–450)
PMV BLD AUTO: 9.9 FL (ref 6–12)
POTASSIUM BLD-SCNC: 4.1 MMOL/L (ref 3.5–5.2)
PROT SERPL-MCNC: 7.4 G/DL (ref 6–8.5)
PROT UR STRIP-MCNC: NEGATIVE MG/DL
RBC # BLD AUTO: 5.16 10*6/MM3 (ref 3.77–5.28)
RBC # UR STRIP: NEGATIVE /UL
SODIUM BLD-SCNC: 142 MMOL/L (ref 136–145)
SP GR UR: 1.01 (ref 1–1.03)
TRIGL SERPL-MCNC: 69 MG/DL (ref 0–150)
TSH SERPL DL<=0.05 MIU/L-ACNC: 2.11 MIU/ML (ref 0.27–4.2)
UROBILINOGEN UR QL: NORMAL
VLDLC SERPL-MCNC: 13.8 MG/DL (ref 5–40)
WBC NRBC COR # BLD: 6 10*3/MM3 (ref 3.4–10.8)

## 2019-08-08 PROCEDURE — 99214 OFFICE O/P EST MOD 30 MIN: CPT | Performed by: NURSE PRACTITIONER

## 2019-08-08 PROCEDURE — 36415 COLL VENOUS BLD VENIPUNCTURE: CPT | Performed by: NURSE PRACTITIONER

## 2019-08-08 PROCEDURE — 81003 URINALYSIS AUTO W/O SCOPE: CPT | Performed by: NURSE PRACTITIONER

## 2019-08-08 PROCEDURE — 80061 LIPID PANEL: CPT | Performed by: NURSE PRACTITIONER

## 2019-08-08 PROCEDURE — 84443 ASSAY THYROID STIM HORMONE: CPT | Performed by: NURSE PRACTITIONER

## 2019-08-08 PROCEDURE — 80053 COMPREHEN METABOLIC PANEL: CPT | Performed by: NURSE PRACTITIONER

## 2019-08-08 PROCEDURE — 85025 COMPLETE CBC W/AUTO DIFF WBC: CPT | Performed by: NURSE PRACTITIONER

## 2019-08-08 RX ORDER — LISINOPRIL AND HYDROCHLOROTHIAZIDE 12.5; 1 MG/1; MG/1
1 TABLET ORAL DAILY
Qty: 30 TABLET | Refills: 5 | Status: SHIPPED | OUTPATIENT
Start: 2019-08-08 | End: 2020-02-12 | Stop reason: SDUPTHER

## 2019-08-08 NOTE — PATIENT INSTRUCTIONS
MyPlate from USDA    MyPlate is an outline of a general healthy diet based on the 2010 Dietary Guidelines for Americans, from the U.S. Department of Agriculture (USDA). It sets guidelines for how much food you should eat from each food group based on your age, sex, and level of physical activity.  What are tips for following MyPlate?  To follow MyPlate recommendations:  · Eat a wide variety of fruits and vegetables, grains, and protein foods.  · Serve smaller portions and eat less food throughout the day.  · Limit portion sizes to avoid overeating.  · Enjoy your food.  · Get at least 150 minutes of exercise every week. This is about 30 minutes each day, 5 or more days per week.  It can be difficult to have every meal look like MyPlate. Think about MyPlate as eating guidelines for an entire day, rather than each individual meal.  Fruits and vegetables  · Make half of your plate fruits and vegetables.  · Eat many different colors of fruits and vegetables each day.  · For a 2,000 calorie daily food plan, eat:  ? 2½ cups of vegetables every day.  ? 2 cups of fruit every day.  · 1 cup is equal to:  ? 1 cup raw or cooked vegetables.  ? 1 cup raw fruit.  ? 1 medium-sized orange, apple, or banana.  ? 1 cup 100% fruit or vegetable juice.  ? 2 cups raw leafy greens, such as lettuce, spinach, or kale.  ? ½ cup dried fruit.  Grains  · One fourth of your plate should be grains.  · Make at least half of the grains you eat each day whole grains.  · For a 2,000 calorie daily food plan, eat 6 oz of grains every day.  · 1 oz is equal to:  ? 1 slice bread.  ? 1 cup cereal.  ? ½ cup cooked rice, cereal, or pasta.  Protein  · One fourth of your plate should be protein.  · Eat a wide variety of protein foods, including meat, poultry, fish, eggs, beans, nuts, and tofu.  · For a 2,000 calorie daily food plan, eat 5½ oz of protein every day.  · 1 oz is equal to:  ? 1 oz meat, poultry, or fish.  ? ¼ cup cooked beans.  ? 1 egg.  ? ½ oz nuts  or seeds.  ? 1 Tbsp peanut butter.  Dairy  · Drink fat-free or low-fat (1%) milk.  · Eat or drink dairy as a side to meals.  · For a 2,000 calorie daily food plan, eat or drink 3 cups of dairy every day.  · 1 cup is equal to:  ? 1 cup milk, yogurt, cottage cheese, or soy milk (soy beverage).  ? 2 oz processed cheese.  ? 1½ oz natural cheese.  Fats, oils, salt, and sugars  · Only small amounts of oils are recommended.  · Avoid foods that are high in calories and low in nutritional value (empty calories), like foods high in fat or added sugars.  · Choose foods that are low in salt (sodium). Choose foods that have less than 140 milligrams (mg) of sodium per serving.  · Drink water instead of sugary drinks. Drink enough water each day to keep your urine pale yellow.  Where to find support  · Work with your health care provider or a nutrition specialist (dietitian) to develop a customized eating plan that is right for you.  · Download an doc (mobile application) to help you track your daily food intake.  Where to find more information  · Go to ChooseMyPlate.gov for more information.  · Learn more and log your daily food intake according to MyPlate using USDA's SuperTracker: www.MiTÃºer.usda.gov  Summary  · MyPlate is a general guideline for healthy eating from the USDA. It is based on the 2010 Dietary Guidelines for Americans.  · In general, fruits and vegetables should take up ½ of your plate, grains should take up ¼ of your plate, and protein should take up ¼ of your plate.  This information is not intended to replace advice given to you by your health care provider. Make sure you discuss any questions you have with your health care provider.  Document Released: 01/06/2009 Document Revised: 03/19/2018 Document Reviewed: 03/19/2018  [employer] Interactive Patient Education © 2019 [employer]

## 2019-08-08 NOTE — PROGRESS NOTES
Chief Complaint   Patient presents with   • Follow-up        Subjective     History of Present Illness   Patient is here today for follow-up of chronic problems.  Patient is recently started on psychology and working out.  She has had a 10 pound weight loss.  She stopped her Hyzaar out of fear due to medication recall she is seen on the knees.  She denies any chest pain shortness of breath or swelling.  She does have hypertension.Her blood pressure is mildly elevated in office today she is not checking her blood pressure at home.    Infection has been off all of her medications.    Patient has history of sleep apnea and does not want to repeat her test.  She reports that she is no longer snoring and feels that she does not need this test.  We have reviewed in the past the risk of untreated sleep apnea she reports today she does can still understand the risk of not having evaluation if she has possible sleep apnea untreated however she does not want to have the test completed.      She has a past medical history of melanoma and squamous cell carcinoma.  She has not been to her dermatologist recently.      Obesity she is down in weight and exercising and using psychology shakes with healthy diet.      pmh HA -none    Hive like whelp only one on either side of neck over 2-3 yrs and cannot assoicate--no oropharngeal symptoms.      The following portions of the patient's history were reviewed and updated as appropriate: allergies, current medications, past family history, past medical history, past social history, past surgical history and problem list.    Review of Systems   Constitutional: Negative for activity change, appetite change, chills, fatigue and fever.   HENT: Negative for congestion, postnasal drip and sore throat.    Eyes: Negative for visual disturbance.   Respiratory: Negative for cough and shortness of breath.    Cardiovascular: Negative for chest pain, palpitations and leg swelling.   Gastrointestinal:  Negative for abdominal pain, constipation, diarrhea, nausea and GERD.   Musculoskeletal: Negative for arthralgias and myalgias.   Skin: Negative for rash.   Allergic/Immunologic: Negative for environmental allergies.   Neurological: Negative for dizziness.   Psychiatric/Behavioral: Negative for sleep disturbance.   All other systems reviewed and are negative.      Objective   Physical Exam   Constitutional: She is oriented to person, place, and time. She appears well-developed and well-nourished.   HENT:   Head: Normocephalic and atraumatic.   Right Ear: External ear normal.   Left Ear: External ear normal.   Nose: Nose normal.   Mouth/Throat: Oropharynx is clear and moist.   Neck: No thyromegaly present.   Cardiovascular: Normal rate, regular rhythm and intact distal pulses.   Pulmonary/Chest: Effort normal and breath sounds normal.   Abdominal: Soft. Bowel sounds are normal. She exhibits no distension. There is no tenderness.   Musculoskeletal: She exhibits no edema.   Lymphadenopathy:     She has no cervical adenopathy.   Neurological: She is alert and oriented to person, place, and time.   Skin: Capillary refill takes 2 to 3 seconds.   Psychiatric: She has a normal mood and affect. Her behavior is normal.   Nursing note and vitals reviewed.         Assessment/Plan   Natalie was seen today for follow-up.    Diagnoses and all orders for this visit:    Nonmelanoma skin cancer  -     Ambulatory Referral to Dermatology    Essential hypertension  -     lisinopril-hydrochlorothiazide (ZESTORETIC) 10-12.5 MG per tablet; Take 1 tablet by mouth Daily.  -     Comprehensive Metabolic Panel  -     POC Urinalysis Dipstick, Automated    SHY (obstructive sleep apnea)  -     CBC & Differential  -     CBC Auto Differential    Obesity (BMI 35.0-39.9 without comorbidity)  -     Lipid Panel    Screening for thyroid disorder  -     TSH    Screening, lipid  -     Lipid Panel    Breast cancer screening  -     Mammo Screening Digital  Tomosynthesis Bilateral With CAD; Future    Post-menopause  -     DEXA Bone Density Axial; Future    Colon cancer screening  -     Ambulatory Referral For Screening Colonoscopy      Encouraged shingles vaccination.  Warned of ACE cough with lisinopril she verbalized understanding let us know if she develops cough.  Return 11 or 12 of 2019 , for fasting, Annual.  RTC/call  If symptoms worsen  Meds MOA and SE's reviewed and pt v/u

## 2019-11-14 ENCOUNTER — TELEPHONE (OUTPATIENT)
Dept: INTERNAL MEDICINE | Facility: CLINIC | Age: 54
End: 2019-11-14

## 2019-12-12 ENCOUNTER — OFFICE VISIT (OUTPATIENT)
Dept: INTERNAL MEDICINE | Facility: CLINIC | Age: 54
End: 2019-12-12

## 2019-12-12 VITALS
TEMPERATURE: 98.2 F | DIASTOLIC BLOOD PRESSURE: 78 MMHG | OXYGEN SATURATION: 98 % | SYSTOLIC BLOOD PRESSURE: 120 MMHG | HEART RATE: 71 BPM | BODY MASS INDEX: 36.99 KG/M2 | WEIGHT: 183.13 LBS | RESPIRATION RATE: 18 BRPM

## 2019-12-12 DIAGNOSIS — J06.9 VIRAL UPPER RESPIRATORY TRACT INFECTION: Primary | ICD-10-CM

## 2019-12-12 DIAGNOSIS — J40 BRONCHITIS: ICD-10-CM

## 2019-12-12 DIAGNOSIS — J32.9 SINUSITIS, UNSPECIFIED CHRONICITY, UNSPECIFIED LOCATION: ICD-10-CM

## 2019-12-12 PROCEDURE — 99213 OFFICE O/P EST LOW 20 MIN: CPT | Performed by: NURSE PRACTITIONER

## 2019-12-12 RX ORDER — METHYLPREDNISOLONE 4 MG/1
TABLET ORAL
Qty: 1 EACH | Refills: 0 | Status: SHIPPED | OUTPATIENT
Start: 2019-12-12 | End: 2020-03-17

## 2019-12-12 RX ORDER — BENZONATATE 200 MG/1
200 CAPSULE ORAL 3 TIMES DAILY PRN
Qty: 30 CAPSULE | Refills: 0 | Status: SHIPPED | OUTPATIENT
Start: 2019-12-12 | End: 2020-03-24

## 2019-12-12 RX ORDER — CEFDINIR 300 MG/1
300 CAPSULE ORAL 2 TIMES DAILY
Qty: 20 CAPSULE | Refills: 0 | Status: SHIPPED | OUTPATIENT
Start: 2019-12-12 | End: 2020-03-17

## 2019-12-12 NOTE — PROGRESS NOTES
Chief Complaint   Patient presents with   • Cough     x 2wks congestion        Subjective     History of Present Illness   Answers for HPI/ROS submitted by the patient on 12/12/2019   Cough  Chronicity: new  Onset: 1 to 4 weeks ago  Progression since onset: unchanged  Frequency: hourly  Cough characteristics: non-productive  chest pain: No  chills: No  ear pain: No  fever: No  heartburn: No  hemoptysis: No  nasal congestion: Yes  postnasal drip: Yes  rhinorrhea: No  shortness of breath: No  sweats: No  weight loss: No  Aggravated by: nothing    The following portions of the patient's history were reviewed and updated as appropriate: allergies, current medications, past family history, past medical history, past social history, past surgical history and problem list.    Review of Systems   Constitutional: Negative for activity change, appetite change, chills, fatigue, fever and unexpected weight loss.   HENT: Positive for postnasal drip. Negative for congestion, ear pain, rhinorrhea and sore throat.    Eyes: Negative for visual disturbance.   Respiratory: Positive for cough. Negative for shortness of breath.    Cardiovascular: Negative for chest pain, palpitations and leg swelling.   Gastrointestinal: Negative for abdominal pain, constipation, diarrhea, nausea and GERD.   Musculoskeletal: Negative for arthralgias and myalgias.   Skin: Negative for rash.   Allergic/Immunologic: Negative for environmental allergies.   Neurological: Negative for dizziness.   Psychiatric/Behavioral: Negative for sleep disturbance.   All other systems reviewed and are negative.      Objective   Physical Exam   Constitutional: She is oriented to person, place, and time. She appears well-developed and well-nourished.   HENT:   Head: Normocephalic and atraumatic.   Mucosa boggy tender to palpate bilateral maxillary sinuses bilateral TMs are dull posterior pharynx with clear postnasal drainage.       Eyes: Conjunctivae are normal. Right eye  exhibits no discharge. Left eye exhibits no discharge.   Cardiovascular: Normal rate and regular rhythm.   Pulmonary/Chest: Effort normal.   Diminished in bases congested, occasional cough.   Abdominal: Soft. Bowel sounds are normal. She exhibits no distension. There is no tenderness.   Lymphadenopathy:     She has no cervical adenopathy.   Neurological: She is alert and oriented to person, place, and time.   Skin: Skin is warm and dry. Capillary refill takes 2 to 3 seconds.   Psychiatric: She has a normal mood and affect. Her behavior is normal.   Nursing note and vitals reviewed.              Assessment/Plan   Natalie was seen today for cough.    Diagnoses and all orders for this visit:    Viral upper respiratory tract infection    Sinusitis, unspecified chronicity, unspecified location  -     cefdinir (OMNICEF) 300 MG capsule; Take 1 capsule by mouth 2 (Two) Times a Day.  -     methylPREDNISolone (MEDROL, LUIS,) 4 MG tablet; Take as directed on package instructions.    Bronchitis  -     cefdinir (OMNICEF) 300 MG capsule; Take 1 capsule by mouth 2 (Two) Times a Day.  -     methylPREDNISolone (MEDROL, LUIS,) 4 MG tablet; Take as directed on package instructions.    Other orders  -     benzonatate (TESSALON) 200 MG capsule; Take 1 capsule by mouth 3 (Three) Times a Day As Needed for Cough.          Return if symptoms worsen or fail to improve.  RTC/call  If symptoms worsen  Meds MOA and SE's reviewed and pt v/u

## 2020-01-03 PROBLEM — Z91.199 COMPLIANCE POOR: Status: ACTIVE | Noted: 2020-01-03

## 2020-02-12 ENCOUNTER — TELEPHONE (OUTPATIENT)
Dept: INTERNAL MEDICINE | Facility: CLINIC | Age: 55
End: 2020-02-12

## 2020-02-12 DIAGNOSIS — I10 ESSENTIAL HYPERTENSION: ICD-10-CM

## 2020-02-12 RX ORDER — LISINOPRIL AND HYDROCHLOROTHIAZIDE 12.5; 1 MG/1; MG/1
1 TABLET ORAL DAILY
Qty: 30 TABLET | Refills: 0 | Status: SHIPPED | OUTPATIENT
Start: 2020-02-12 | End: 2020-03-18 | Stop reason: SDUPTHER

## 2020-02-12 NOTE — TELEPHONE ENCOUNTER
PHARMACY CALLED FOR MED REFILL  lisinopril-hydrochlorothiazide (ZESTORETIC) 10-12.5 MG per tablet    [facility] [phone]

## 2020-03-17 ENCOUNTER — OFFICE VISIT (OUTPATIENT)
Dept: INTERNAL MEDICINE | Facility: CLINIC | Age: 55
End: 2020-03-17

## 2020-03-17 ENCOUNTER — RESULTS ENCOUNTER (OUTPATIENT)
Dept: INTERNAL MEDICINE | Facility: CLINIC | Age: 55
End: 2020-03-17

## 2020-03-17 VITALS
SYSTOLIC BLOOD PRESSURE: 120 MMHG | BODY MASS INDEX: 37.37 KG/M2 | HEART RATE: 62 BPM | RESPIRATION RATE: 18 BRPM | WEIGHT: 185 LBS | TEMPERATURE: 98.4 F | DIASTOLIC BLOOD PRESSURE: 80 MMHG | OXYGEN SATURATION: 97 %

## 2020-03-17 DIAGNOSIS — Z78.0 POST-MENOPAUSAL: ICD-10-CM

## 2020-03-17 DIAGNOSIS — I10 ESSENTIAL HYPERTENSION: Primary | ICD-10-CM

## 2020-03-17 DIAGNOSIS — Z12.31 SCREENING MAMMOGRAM, ENCOUNTER FOR: ICD-10-CM

## 2020-03-17 DIAGNOSIS — Z12.11 COLON CANCER SCREENING: ICD-10-CM

## 2020-03-17 DIAGNOSIS — R94.6 ABNORMAL THYROID SCAN: ICD-10-CM

## 2020-03-17 DIAGNOSIS — Z13.220 LIPID SCREENING: ICD-10-CM

## 2020-03-17 DIAGNOSIS — E66.9 OBESITY (BMI 35.0-39.9 WITHOUT COMORBIDITY): ICD-10-CM

## 2020-03-17 DIAGNOSIS — E66.01 CLASS 2 SEVERE OBESITY WITH SERIOUS COMORBIDITY AND BODY MASS INDEX (BMI) OF 37.0 TO 37.9 IN ADULT, UNSPECIFIED OBESITY TYPE (HCC): ICD-10-CM

## 2020-03-17 DIAGNOSIS — R51.9 CHRONIC INTRACTABLE HEADACHE, UNSPECIFIED HEADACHE TYPE: ICD-10-CM

## 2020-03-17 DIAGNOSIS — G47.33 OSA (OBSTRUCTIVE SLEEP APNEA): ICD-10-CM

## 2020-03-17 DIAGNOSIS — G89.29 CHRONIC INTRACTABLE HEADACHE, UNSPECIFIED HEADACHE TYPE: ICD-10-CM

## 2020-03-17 DIAGNOSIS — E03.9 HYPOTHYROIDISM, UNSPECIFIED TYPE: ICD-10-CM

## 2020-03-17 LAB
BILIRUB BLD-MCNC: NEGATIVE MG/DL
CLARITY, POC: CLEAR
COLOR UR: YELLOW
EXPIRATION DATE: NORMAL
GLUCOSE UR STRIP-MCNC: NEGATIVE MG/DL
KETONES UR QL: NEGATIVE
LEUKOCYTE EST, POC: NEGATIVE
Lab: NORMAL
NITRITE UR-MCNC: NEGATIVE MG/ML
PH UR: 7 [PH] (ref 5–8)
PROT UR STRIP-MCNC: NEGATIVE MG/DL
RBC # UR STRIP: NEGATIVE /UL
SP GR UR: 1 (ref 1–1.03)
UROBILINOGEN UR QL: NORMAL

## 2020-03-17 PROCEDURE — 85025 COMPLETE CBC W/AUTO DIFF WBC: CPT | Performed by: NURSE PRACTITIONER

## 2020-03-17 PROCEDURE — 80053 COMPREHEN METABOLIC PANEL: CPT | Performed by: NURSE PRACTITIONER

## 2020-03-17 PROCEDURE — 81003 URINALYSIS AUTO W/O SCOPE: CPT | Performed by: NURSE PRACTITIONER

## 2020-03-17 PROCEDURE — 84443 ASSAY THYROID STIM HORMONE: CPT | Performed by: NURSE PRACTITIONER

## 2020-03-17 PROCEDURE — 36415 COLL VENOUS BLD VENIPUNCTURE: CPT | Performed by: NURSE PRACTITIONER

## 2020-03-17 PROCEDURE — 99214 OFFICE O/P EST MOD 30 MIN: CPT | Performed by: NURSE PRACTITIONER

## 2020-03-17 PROCEDURE — 80061 LIPID PANEL: CPT | Performed by: NURSE PRACTITIONER

## 2020-03-17 NOTE — PROGRESS NOTES
Chief Complaint   Patient presents with   • Hypertension     med refill        Subjective     History of Present Illness   Patient is here today for follow-up of chronic problems.  Patient has hypertension.  Patient takes Zestoretic.  Patient denies chest pain shortness of breath.  Blood pressure today-Nml and at home in nml range as well.  Up 2#  Exercise- N  Diet-N    Patient has history of snoring and has obstructive sleep apnea but has had in the past poor adherence to CPAP.  If use.  At the last visit she reported her snoring has ceased when she changed her mattress.    Patient has history of headaches with Imitrex. No HA.    Patient has obesity with serious comorbidities.  She is up 2 pounds.        The following portions of the patient's history were reviewed and updated as appropriate: allergies, current medications, past family history, past medical history, past social history, past surgical history and problem list.      Current Outpatient Medications:   •  Multiple Vitamins-Minerals (MULTIVITAMIN ADULT PO), Take  by mouth., Disp: , Rfl:   •  benzonatate (TESSALON) 200 MG capsule, Take 1 capsule by mouth 3 (Three) Times a Day As Needed for Cough., Disp: 30 capsule, Rfl: 0  •  lisinopril-hydrochlorothiazide (Zestoretic) 10-12.5 MG per tablet, Take 1 tablet by mouth Daily., Disp: 90 tablet, Rfl: 1  •  SUMAtriptan (IMITREX) 100 MG tablet, Take 1 tablet daily as needed for migraine., Disp: 7 tablet, Rfl: 0    Vitals:    03/17/20 1536   BP: 120/80   Pulse: 62   Resp: 18   Temp: 98.4 °F (36.9 °C)   SpO2: 97%       Body mass index is 37.37 kg/m².        Review of Systems   Constitutional: Negative for activity change, appetite change, chills, fatigue and fever.   HENT: Negative for congestion, postnasal drip and sore throat.    Eyes: Negative for visual disturbance.   Respiratory: Negative for cough and shortness of breath.    Cardiovascular: Negative for chest pain, palpitations and leg swelling.    Gastrointestinal: Negative for abdominal pain, constipation, diarrhea, nausea and GERD.   Musculoskeletal: Negative for arthralgias and myalgias.   Skin: Negative for rash.   Allergic/Immunologic: Negative for environmental allergies.   Neurological: Negative for dizziness.   Psychiatric/Behavioral: Negative for sleep disturbance.   All other systems reviewed and are negative.      Objective   Physical Exam   Constitutional: She is oriented to person, place, and time. She appears well-developed and well-nourished.   HENT:   Head: Normocephalic and atraumatic.   Right Ear: External ear normal.   Left Ear: External ear normal.   Nose: Nose normal.   Mouth/Throat: Oropharynx is clear and moist.   Neck: No thyromegaly present.   Cardiovascular: Normal rate, regular rhythm and intact distal pulses.   Pulmonary/Chest: Effort normal and breath sounds normal.   Abdominal: Soft. Bowel sounds are normal. She exhibits no distension. There is no tenderness.   Musculoskeletal: She exhibits no edema.   Lymphadenopathy:     She has no cervical adenopathy.   Neurological: She is alert and oriented to person, place, and time.   Skin: Skin is warm and dry. Capillary refill takes 2 to 3 seconds.   Psychiatric: She has a normal mood and affect. Her behavior is normal.   Nursing note and vitals reviewed.           Assessment/Plan   Natalie was seen today for hypertension.    Diagnoses and all orders for this visit:    Essential hypertension  -     Comprehensive Metabolic Panel; Future  -     POC Urinalysis Dipstick, Automated; Future  -     POC Urinalysis Dipstick, Automated  -     Comprehensive Metabolic Panel    SHY (obstructive sleep apnea)  -     CBC & Differential; Future  -     CBC & Differential  -     CBC Auto Differential    Class 2 severe obesity with serious comorbidity and body mass index (BMI) of 37.0 to 37.9 in adult, unspecified obesity type (CMS/HCC)  -     CBC & Differential; Future  -     CBC & Differential  -     CBC  Auto Differential    Obesity (BMI 35.0-39.9 without comorbidity)    Chronic intractable headache, unspecified headache type    Lipid screening  -     Lipid Panel; Future  -     Lipid Panel    Screening mammogram, encounter for  -     Mammo Screening Digital Tomosynthesis Bilateral With CAD; Future    Post-menopausal  -     DEXA Bone Density Axial; Future    Abnormal thyroid scan    Colon cancer screening  -     Cologuard - Stool, Per Rectum; Future    Hypothyroidism, unspecified type  -     TSH; Future  -     TSH    Patient is due for shingles vaccination encouraged.    Patient is due for colonoscopy which is been ordered.  Declines but till do cologard and qualifies to do so.   If completed all plans to complete.  Patient is due for annual exam.  Patient is due for DEXA bone scan.  Patient is due for mammogram. Declines flu shot.  Discussed the patient's BMI with her. BMI is above normal parameters. Recommendations include: educational material.    Return in about 6 months (around 9/17/2020) for fasting, Annual.  RTC/call  If symptoms worsen  Meds MOA and SE's reviewed and pt v/u

## 2020-03-17 NOTE — PATIENT INSTRUCTIONS
"DASH Eating Plan  DASH stands for \"Dietary Approaches to Stop Hypertension.\" The DASH eating plan is a healthy eating plan that has been shown to reduce high blood pressure (hypertension). It may also reduce your risk for type 2 diabetes, heart disease, and stroke. The DASH eating plan may also help with weight loss.  What are tips for following this plan?    General guidelines  · Avoid eating more than 2,300 mg (milligrams) of salt (sodium) a day. If you have hypertension, you may need to reduce your sodium intake to 1,500 mg a day.  · Limit alcohol intake to no more than 1 drink a day for nonpregnant women and 2 drinks a day for men. One drink equals 12 oz of beer, 5 oz of wine, or 1½ oz of hard liquor.  · Work with your health care provider to maintain a healthy body weight or to lose weight. Ask what an ideal weight is for you.  · Get at least 30 minutes of exercise that causes your heart to beat faster (aerobic exercise) most days of the week. Activities may include walking, swimming, or biking.  · Work with your health care provider or diet and nutrition specialist (dietitian) to adjust your eating plan to your individual calorie needs.  Reading food labels    · Check food labels for the amount of sodium per serving. Choose foods with less than 5 percent of the Daily Value of sodium. Generally, foods with less than 300 mg of sodium per serving fit into this eating plan.  · To find whole grains, look for the word \"whole\" as the first word in the ingredient list.  Shopping  · Buy products labeled as \"low-sodium\" or \"no salt added.\"  · Buy fresh foods. Avoid canned foods and premade or frozen meals.  Cooking  · Avoid adding salt when cooking. Use salt-free seasonings or herbs instead of table salt or sea salt. Check with your health care provider or pharmacist before using salt substitutes.  · Do not bonilla foods. Cook foods using healthy methods such as baking, boiling, grilling, and broiling instead.  · Cook with " heart-healthy oils, such as olive, canola, soybean, or sunflower oil.  Meal planning  · Eat a balanced diet that includes:  ? 5 or more servings of fruits and vegetables each day. At each meal, try to fill half of your plate with fruits and vegetables.  ? Up to 6-8 servings of whole grains each day.  ? Less than 6 oz of lean meat, poultry, or fish each day. A 3-oz serving of meat is about the same size as a deck of cards. One egg equals 1 oz.  ? 2 servings of low-fat dairy each day.  ? A serving of nuts, seeds, or beans 5 times each week.  ? Heart-healthy fats. Healthy fats called Omega-3 fatty acids are found in foods such as flaxseeds and coldwater fish, like sardines, salmon, and mackerel.  · Limit how much you eat of the following:  ? Canned or prepackaged foods.  ? Food that is high in trans fat, such as fried foods.  ? Food that is high in saturated fat, such as fatty meat.  ? Sweets, desserts, sugary drinks, and other foods with added sugar.  ? Full-fat dairy products.  · Do not salt foods before eating.  · Try to eat at least 2 vegetarian meals each week.  · Eat more home-cooked food and less restaurant, buffet, and fast food.  · When eating at a restaurant, ask that your food be prepared with less salt or no salt, if possible.  What foods are recommended?  The items listed may not be a complete list. Talk with your dietitian about what dietary choices are best for you.  Grains  Whole-grain or whole-wheat bread. Whole-grain or whole-wheat pasta. Brown rice. Oatmeal. Quinoa. Bulgur. Whole-grain and low-sodium cereals. Jade bread. Low-fat, low-sodium crackers. Whole-wheat flour tortillas.  Vegetables  Fresh or frozen vegetables (raw, steamed, roasted, or grilled). Low-sodium or reduced-sodium tomato and vegetable juice. Low-sodium or reduced-sodium tomato sauce and tomato paste. Low-sodium or reduced-sodium canned vegetables.  Fruits  All fresh, dried, or frozen fruit. Canned fruit in natural juice (without  added sugar).  Meat and other protein foods  Skinless chicken or turkey. Ground chicken or turkey. Pork with fat trimmed off. Fish and seafood. Egg whites. Dried beans, peas, or lentils. Unsalted nuts, nut butters, and seeds. Unsalted canned beans. Lean cuts of beef with fat trimmed off. Low-sodium, lean deli meat.  Dairy  Low-fat (1%) or fat-free (skim) milk. Fat-free, low-fat, or reduced-fat cheeses. Nonfat, low-sodium ricotta or cottage cheese. Low-fat or nonfat yogurt. Low-fat, low-sodium cheese.  Fats and oils  Soft margarine without trans fats. Vegetable oil. Low-fat, reduced-fat, or light mayonnaise and salad dressings (reduced-sodium). Canola, safflower, olive, soybean, and sunflower oils. Avocado.  Seasoning and other foods  Herbs. Spices. Seasoning mixes without salt. Unsalted popcorn and pretzels. Fat-free sweets.  What foods are not recommended?  The items listed may not be a complete list. Talk with your dietitian about what dietary choices are best for you.  Grains  Baked goods made with fat, such as croissants, muffins, or some breads. Dry pasta or rice meal packs.  Vegetables  Creamed or fried vegetables. Vegetables in a cheese sauce. Regular canned vegetables (not low-sodium or reduced-sodium). Regular canned tomato sauce and paste (not low-sodium or reduced-sodium). Regular tomato and vegetable juice (not low-sodium or reduced-sodium). Pickles. Olives.  Fruits  Canned fruit in a light or heavy syrup. Fried fruit. Fruit in cream or butter sauce.  Meat and other protein foods  Fatty cuts of meat. Ribs. Fried meat. Gilliam. Sausage. Bologna and other processed lunch meats. Salami. Fatback. Hotdogs. Bratwurst. Salted nuts and seeds. Canned beans with added salt. Canned or smoked fish. Whole eggs or egg yolks. Chicken or turkey with skin.  Dairy  Whole or 2% milk, cream, and half-and-half. Whole or full-fat cream cheese. Whole-fat or sweetened yogurt. Full-fat cheese. Nondairy creamers. Whipped toppings.  Processed cheese and cheese spreads.  Fats and oils  Butter. Stick margarine. Lard. Shortening. Ghee. Gilliam fat. Tropical oils, such as coconut, palm kernel, or palm oil.  Seasoning and other foods  Salted popcorn and pretzels. Onion salt, garlic salt, seasoned salt, table salt, and sea salt. Worcestershire sauce. Tartar sauce. Barbecue sauce. Teriyaki sauce. Soy sauce, including reduced-sodium. Steak sauce. Canned and packaged gravies. Fish sauce. Oyster sauce. Cocktail sauce. Horseradish that you find on the shelf. Ketchup. Mustard. Meat flavorings and tenderizers. Bouillon cubes. Hot sauce and Tabasco sauce. Premade or packaged marinades. Premade or packaged taco seasonings. Relishes. Regular salad dressings.  Where to find more information:  · National Heart, Lung, and Blood [place]: www.nhlbi.nih.gov  · American Heart Association: www.heart.org  Summary  · The DASH eating plan is a healthy eating plan that has been shown to reduce high blood pressure (hypertension). It may also reduce your risk for type 2 diabetes, heart disease, and stroke.  · With the DASH eating plan, you should limit salt (sodium) intake to 2,300 mg a day. If you have hypertension, you may need to reduce your sodium intake to 1,500 mg a day.  · When on the DASH eating plan, aim to eat more fresh fruits and vegetables, whole grains, lean proteins, low-fat dairy, and heart-healthy fats.  · Work with your health care provider or diet and nutrition specialist (dietitian) to adjust your eating plan to your individual calorie needs.  This information is not intended to replace advice given to you by your health care provider. Make sure you discuss any questions you have with your health care provider.  Document Released: 12/06/2012 Document Revised: 12/11/2017 Document Reviewed: 12/11/2017  [employer] Interactive Patient Education © 2020 [employer]

## 2020-03-18 ENCOUNTER — TELEPHONE (OUTPATIENT)
Dept: INTERNAL MEDICINE | Facility: CLINIC | Age: 55
End: 2020-03-18

## 2020-03-18 DIAGNOSIS — I10 ESSENTIAL HYPERTENSION: ICD-10-CM

## 2020-03-18 LAB
ALBUMIN SERPL-MCNC: 4.5 G/DL (ref 3.5–5.2)
ALBUMIN/GLOB SERPL: 2.1 G/DL
ALP SERPL-CCNC: 72 U/L (ref 39–117)
ALT SERPL W P-5'-P-CCNC: 25 U/L (ref 1–33)
ANION GAP SERPL CALCULATED.3IONS-SCNC: 10.9 MMOL/L (ref 5–15)
AST SERPL-CCNC: 21 U/L (ref 1–32)
BASOPHILS # BLD AUTO: 0.06 10*3/MM3 (ref 0–0.2)
BASOPHILS NFR BLD AUTO: 0.6 % (ref 0–1.5)
BILIRUB SERPL-MCNC: 0.5 MG/DL (ref 0.2–1.2)
BUN BLD-MCNC: 12 MG/DL (ref 6–20)
BUN/CREAT SERPL: 16 (ref 7–25)
CALCIUM SPEC-SCNC: 9.6 MG/DL (ref 8.6–10.5)
CHLORIDE SERPL-SCNC: 100 MMOL/L (ref 98–107)
CHOLEST SERPL-MCNC: 154 MG/DL (ref 0–200)
CO2 SERPL-SCNC: 28.1 MMOL/L (ref 22–29)
CREAT BLD-MCNC: 0.75 MG/DL (ref 0.57–1)
DEPRECATED RDW RBC AUTO: 42 FL (ref 37–54)
EOSINOPHIL # BLD AUTO: 0.12 10*3/MM3 (ref 0–0.4)
EOSINOPHIL NFR BLD AUTO: 1.2 % (ref 0.3–6.2)
ERYTHROCYTE [DISTWIDTH] IN BLOOD BY AUTOMATED COUNT: 13.2 % (ref 12.3–15.4)
GFR SERPL CREATININE-BSD FRML MDRD: 80 ML/MIN/1.73
GLOBULIN UR ELPH-MCNC: 2.1 GM/DL
GLUCOSE BLD-MCNC: 89 MG/DL (ref 65–99)
HCT VFR BLD AUTO: 44.3 % (ref 34–46.6)
HDLC SERPL-MCNC: 62 MG/DL (ref 40–60)
HGB BLD-MCNC: 15.5 G/DL (ref 12–15.9)
IMM GRANULOCYTES # BLD AUTO: 0.04 10*3/MM3 (ref 0–0.05)
IMM GRANULOCYTES NFR BLD AUTO: 0.4 % (ref 0–0.5)
LDLC SERPL CALC-MCNC: 71 MG/DL (ref 0–100)
LDLC/HDLC SERPL: 1.15 {RATIO}
LYMPHOCYTES # BLD AUTO: 2.54 10*3/MM3 (ref 0.7–3.1)
LYMPHOCYTES NFR BLD AUTO: 25.6 % (ref 19.6–45.3)
MCH RBC QN AUTO: 30.9 PG (ref 26.6–33)
MCHC RBC AUTO-ENTMCNC: 35 G/DL (ref 31.5–35.7)
MCV RBC AUTO: 88.2 FL (ref 79–97)
MONOCYTES # BLD AUTO: 0.88 10*3/MM3 (ref 0.1–0.9)
MONOCYTES NFR BLD AUTO: 8.9 % (ref 5–12)
NEUTROPHILS # BLD AUTO: 6.28 10*3/MM3 (ref 1.7–7)
NEUTROPHILS NFR BLD AUTO: 63.3 % (ref 42.7–76)
NRBC BLD AUTO-RTO: 0 /100 WBC (ref 0–0.2)
PLATELET # BLD AUTO: 311 10*3/MM3 (ref 140–450)
PMV BLD AUTO: 9.9 FL (ref 6–12)
POTASSIUM BLD-SCNC: 4.1 MMOL/L (ref 3.5–5.2)
PROT SERPL-MCNC: 6.6 G/DL (ref 6–8.5)
RBC # BLD AUTO: 5.02 10*6/MM3 (ref 3.77–5.28)
SODIUM BLD-SCNC: 139 MMOL/L (ref 136–145)
TRIGL SERPL-MCNC: 103 MG/DL (ref 0–150)
TSH SERPL DL<=0.05 MIU/L-ACNC: 2.29 UIU/ML (ref 0.27–4.2)
VLDLC SERPL-MCNC: 20.6 MG/DL (ref 5–40)
WBC NRBC COR # BLD: 9.92 10*3/MM3 (ref 3.4–10.8)

## 2020-03-18 RX ORDER — LISINOPRIL AND HYDROCHLOROTHIAZIDE 12.5; 1 MG/1; MG/1
1 TABLET ORAL DAILY
Qty: 90 TABLET | Refills: 1 | Status: SHIPPED | OUTPATIENT
Start: 2020-03-18 | End: 2020-09-01 | Stop reason: SDUPTHER

## 2020-03-18 NOTE — TELEPHONE ENCOUNTER
PT CALLED STATES SHE IS NEEDING A REFILL ON THE FOLLOWING MEDICATION:     lisinopril-hydrochlorothiazide (ZESTORETIC) 10-12.5 MG per tablet     PT IS COMPLETELY OUT OF MEDICATION      CONFIRMED PHARMACY:   [place], [place] [address] [facility] [phone] [facility] [phone]     CONTACT: [phone]

## 2020-07-14 ENCOUNTER — HOSPITAL ENCOUNTER (OUTPATIENT)
Dept: BONE DENSITY | Facility: HOSPITAL | Age: 55
Discharge: HOME | End: 2020-07-14

## 2020-07-14 ENCOUNTER — HOSPITAL ENCOUNTER (OUTPATIENT)
Dept: MAMMOGRAPHY | Facility: HOSPITAL | Age: 55
Discharge: HOME | End: 2020-07-14
Admitting: NURSE PRACTITIONER

## 2020-07-14 DIAGNOSIS — Z12.31 SCREENING MAMMOGRAM, ENCOUNTER FOR: ICD-10-CM

## 2020-07-14 DIAGNOSIS — Z78.0 POST-MENOPAUSAL: ICD-10-CM

## 2020-07-14 PROCEDURE — 77067 SCR MAMMO BI INCL CAD: CPT | Performed by: RADIOLOGY

## 2020-07-14 PROCEDURE — 77067 SCR MAMMO BI INCL CAD: CPT

## 2020-07-14 PROCEDURE — 77063 BREAST TOMOSYNTHESIS BI: CPT | Performed by: RADIOLOGY

## 2020-07-14 PROCEDURE — 77080 DXA BONE DENSITY AXIAL: CPT

## 2020-07-14 PROCEDURE — 77063 BREAST TOMOSYNTHESIS BI: CPT

## 2020-07-17 PROBLEM — M85.852 OSTEOPENIA OF NECK OF LEFT FEMUR: Status: ACTIVE | Noted: 2020-07-17

## 2020-07-17 PROBLEM — M85.88 OSTEOPENIA OF LUMBAR SPINE: Status: ACTIVE | Noted: 2020-07-17

## 2020-09-01 DIAGNOSIS — I10 ESSENTIAL HYPERTENSION: ICD-10-CM

## 2020-09-01 RX ORDER — LISINOPRIL AND HYDROCHLOROTHIAZIDE 12.5; 1 MG/1; MG/1
1 TABLET ORAL DAILY
Qty: 90 TABLET | Refills: 0 | Status: SHIPPED | OUTPATIENT
Start: 2020-09-01 | End: 2020-12-10

## 2020-10-08 ENCOUNTER — OFFICE VISIT (OUTPATIENT)
Dept: INTERNAL MEDICINE | Facility: CLINIC | Age: 55
End: 2020-10-08

## 2020-10-08 VITALS
OXYGEN SATURATION: 99 % | RESPIRATION RATE: 14 BRPM | WEIGHT: 188.4 LBS | SYSTOLIC BLOOD PRESSURE: 120 MMHG | DIASTOLIC BLOOD PRESSURE: 90 MMHG | BODY MASS INDEX: 36.99 KG/M2 | TEMPERATURE: 97 F | HEART RATE: 58 BPM | HEIGHT: 60 IN

## 2020-10-08 DIAGNOSIS — Z13.29 THYROID DISORDER SCREENING: ICD-10-CM

## 2020-10-08 DIAGNOSIS — H69.82 EUSTACHIAN TUBE DYSFUNCTION, LEFT: ICD-10-CM

## 2020-10-08 DIAGNOSIS — Z78.0 POST-MENOPAUSE: ICD-10-CM

## 2020-10-08 DIAGNOSIS — M85.88 OSTEOPENIA OF LUMBAR SPINE: ICD-10-CM

## 2020-10-08 DIAGNOSIS — I10 ESSENTIAL HYPERTENSION: ICD-10-CM

## 2020-10-08 DIAGNOSIS — E66.01 CLASS 2 SEVERE OBESITY WITH SERIOUS COMORBIDITY AND BODY MASS INDEX (BMI) OF 37.0 TO 37.9 IN ADULT, UNSPECIFIED OBESITY TYPE (HCC): ICD-10-CM

## 2020-10-08 DIAGNOSIS — Z00.00 ANNUAL PHYSICAL EXAM: Primary | ICD-10-CM

## 2020-10-08 DIAGNOSIS — G47.33 OSA (OBSTRUCTIVE SLEEP APNEA): ICD-10-CM

## 2020-10-08 DIAGNOSIS — Z13.220 LIPID SCREENING: ICD-10-CM

## 2020-10-08 DIAGNOSIS — Z00.00 HEALTHCARE MAINTENANCE: ICD-10-CM

## 2020-10-08 LAB
BILIRUB BLD-MCNC: NEGATIVE MG/DL
CLARITY, POC: CLEAR
COLOR UR: YELLOW
EXPIRATION DATE: NORMAL
GLUCOSE UR STRIP-MCNC: NEGATIVE MG/DL
KETONES UR QL: NEGATIVE
LEUKOCYTE EST, POC: NEGATIVE
Lab: NORMAL
NITRITE UR-MCNC: NEGATIVE MG/ML
PH UR: 7 [PH] (ref 5–8)
PROT UR STRIP-MCNC: NEGATIVE MG/DL
RBC # UR STRIP: NEGATIVE /UL
SP GR UR: 1.01 (ref 1–1.03)
UROBILINOGEN UR QL: NORMAL

## 2020-10-08 PROCEDURE — 99396 PREV VISIT EST AGE 40-64: CPT | Performed by: NURSE PRACTITIONER

## 2020-10-08 PROCEDURE — 36415 COLL VENOUS BLD VENIPUNCTURE: CPT | Performed by: NURSE PRACTITIONER

## 2020-10-08 PROCEDURE — 81003 URINALYSIS AUTO W/O SCOPE: CPT | Performed by: NURSE PRACTITIONER

## 2020-10-08 NOTE — PROGRESS NOTES
Chief Complaint   Patient presents with   • Annual Exam     Fasting, w/o pap   • Ear Fullness     Left, popping, last night         Subjective     History of Present Illness   Patient is here for fasting physical exam.  Patient has hypertension her blood pressure is mildly elevated in office today.  She did not take her blood pressure medication before coming to clinic today because it causes nausea if she takes it without food.  Patient takes lisinopril hydrochlorothiazide denies chest pain shortness of breath or swelling.    Patient has obstructive sleep apnea as well as snoring.  Patient had sleep study years ago was borderline.  She was intolerant to the CPAP.  She does not want another sleep evaluation at this time.    Patient has class II severe obesity with serious comorbidity.    Patient has history of headaches without exacerbation of headaches.    Patient has osteopenia of the lumbar spine and neck of the left femur.  She is not specifically doing muscle strengthening at this time.    Patient has a new problem in regards to ear pain.  Ear pain L ear intermittent 1 months.  Noted initially when she was driving in car and there was no elevation.  She has had before and otc ear gtts used-unsure what type.  Her symptoms resolved and then last night her symptoms began again.  She took Advil and Zyrtec and her symptoms are resolved again this morning.             The following portions of the patient's history were reviewed and updated as appropriate: allergies, current medications, past family history, past medical history, past social history, past surgical history and problem list.      Current Outpatient Medications:   •  lisinopril-hydrochlorothiazide (Zestoretic) 10-12.5 MG per tablet, Take 1 tablet by mouth Daily., Disp: 90 tablet, Rfl: 0  •  Multiple Vitamins-Minerals (MULTIVITAMIN ADULT PO), Take  by mouth., Disp: , Rfl:   •  SUMAtriptan (IMITREX) 100 MG tablet, Take 1 tablet daily as needed for  migraine., Disp: 7 tablet, Rfl: 0    Vitals:    10/08/20 1011   BP: 120/90   Pulse: 58   Resp: 14   Temp: 97 °F (36.1 °C)   SpO2: 99%       Body mass index is 36.79 kg/m².        Review of Systems   Constitutional: Negative.    HENT: Positive for ear pain. Negative for congestion, ear discharge, facial swelling, postnasal drip, rhinorrhea, sinus pressure, sneezing, sore throat, swollen glands, tinnitus, trouble swallowing and voice change.    Eyes: Negative.    Respiratory: Negative.    Cardiovascular: Negative.    Gastrointestinal: Negative.    Endocrine: Negative.    Genitourinary: Negative.    Musculoskeletal: Negative.    Skin: Negative.    Allergic/Immunologic: Negative.    Neurological: Negative.    Hematological: Negative.    Psychiatric/Behavioral: Negative.        Objective   Physical Exam  Vitals signs and nursing note reviewed.   Constitutional:       Appearance: She is well-developed.   HENT:      Head: Normocephalic and atraumatic.      Comments: Clear fluid noted behind the TM.  No tenderness on exam.     Right Ear: Tympanic membrane, ear canal and external ear normal.      Left Ear: Ear canal and external ear normal.      Nose: Nose normal.   Eyes:      General: No scleral icterus.        Right eye: No discharge.         Left eye: No discharge.      Conjunctiva/sclera: Conjunctivae normal.      Pupils: Pupils are equal, round, and reactive to light.   Neck:      Musculoskeletal: Normal range of motion.      Thyroid: No thyromegaly.      Vascular: No carotid bruit.   Cardiovascular:      Rate and Rhythm: Normal rate and regular rhythm.      Heart sounds: Normal heart sounds. No murmur. No friction rub. No gallop.    Pulmonary:      Effort: Pulmonary effort is normal.      Breath sounds: Normal breath sounds.   Chest:      Breasts:         Right: Normal.         Left: Normal.   Abdominal:      General: Bowel sounds are normal. There is no distension.      Palpations: Abdomen is soft. There is no mass.       Tenderness: There is no abdominal tenderness. There is no guarding or rebound.      Hernia: No hernia is present.   Musculoskeletal: Normal range of motion.   Lymphadenopathy:      Head:      Right side of head: No submental, submandibular, tonsillar, preauricular, posterior auricular or occipital adenopathy.      Left side of head: No submental, submandibular, tonsillar, preauricular, posterior auricular or occipital adenopathy.      Cervical: No cervical adenopathy.      Upper Body:      Right upper body: No supraclavicular, axillary or epitrochlear adenopathy.      Left upper body: No supraclavicular, axillary or epitrochlear adenopathy.   Skin:     General: Skin is warm and dry.      Capillary Refill: Capillary refill takes 2 to 3 seconds.   Neurological:      Mental Status: She is alert and oriented to person, place, and time.      Deep Tendon Reflexes: Reflexes normal.   Psychiatric:         Behavior: Behavior normal.         Thought Content: Thought content normal.         Judgment: Judgment normal.             Results for orders placed or performed in visit on 10/08/20   POC Urinalysis Dipstick, Automated    Specimen: Urine   Result Value Ref Range    Color Yellow Yellow, Straw, Dark Yellow, Harini    Clarity, UA Clear Clear    Specific Gravity  1.010 1.005 - 1.030    pH, Urine 7.0 5.0 - 8.0    Leukocytes Negative Negative    Nitrite, UA Negative Negative    Protein, POC Negative Negative mg/dL    Glucose, UA Negative Negative, 1000 mg/dL (3+) mg/dL    Ketones, UA Negative Negative    Urobilinogen, UA Normal Normal    Bilirubin Negative Negative    Blood, UA Negative Negative    Lot Number [id number]     Expiration Date 5-31-21         Assessment/Plan   Natalie was seen today for annual exam and ear fullness.    Diagnoses and all orders for this visit:    Annual physical exam    Healthcare maintenance  -     POC Urinalysis Dipstick, Automated    Essential hypertension  -     Comprehensive Metabolic  Panel    SHY (obstructive sleep apnea)  -     CBC & Differential    Class 2 severe obesity with serious comorbidity and body mass index (BMI) of 37.0 to 37.9 in adult, unspecified obesity type (CMS/HCC)    Osteopenia of lumbar spine    Lipid screening  -     Comprehensive Metabolic Panel  -     Lipid Panel    Post-menopause    Thyroid disorder screening  -     TSH    Eustachian tube dysfunction, left    Patient education regarding the importance of avoidance of texting while driving and the need for wearing seatbelt.  Use of sunscreen, use of helmets while on bikes.  The appropriate amounts of sleep and H20 consumption per day was reviewed with pt.  The need for healthy well-balanced diet based off the food guide pyramid and avoidance of skipping meals along with following a NCS diet with appropriate amounts of fats, protein, and carbohydrate and low sodium diet. Encouraging 30minutes of cardio 5d weekly and muscle strengthening 3x weekly.     Compliance is poor.  Patient has not completed her colonoscopy and declines to do so.  Patient also declines a Cologuard at this time.  The patient declines her immunizations including her flu shot.  I have asked the patient to obtain her shingles vaccination post pandemic since she is working in a  would not be able to work and be quarantined if she developed a fever which could be a side effect to the immunization.  Risk and benefits reviewed with patient regards to obtaining Pap smear which she declines today.  Risk and benefits reviewed with patient regarding not obtaining colonoscopy and flu shot.  Colonoscopy risk including possibility of missed early diagnosis of colon cancer which would increase risk for poor outcome including death from advanced colon cancer.  The patient's barriers to up to having test completed were reviewed and the patient states she will speak with her .  The patient request to have these completed I have encouraged her strongly to  reach back out to me and I will set them up for her.  Start over-the-counter Flonase for eustachian tube dysfunction  Discussed the patient's BMI with her. BMI is above normal parameters. Recommendations include: educational material.    Return in about 1 month (around 11/8/2020) for fasting.  RTC/call  If symptoms worsen  Meds MOA and SE's reviewed and pt v/u

## 2020-10-09 LAB
ALBUMIN SERPL-MCNC: 4.7 G/DL (ref 3.5–5.2)
ALBUMIN/GLOB SERPL: 2.4 G/DL
ALP SERPL-CCNC: 77 U/L (ref 39–117)
ALT SERPL-CCNC: 37 U/L (ref 1–33)
AST SERPL-CCNC: 31 U/L (ref 1–32)
BASOPHILS # BLD AUTO: 0.04 10*3/MM3 (ref 0–0.2)
BASOPHILS NFR BLD AUTO: 0.6 % (ref 0–1.5)
BILIRUB SERPL-MCNC: 0.8 MG/DL (ref 0–1.2)
BUN SERPL-MCNC: 15 MG/DL (ref 6–20)
BUN/CREAT SERPL: 19.5 (ref 7–25)
CALCIUM SERPL-MCNC: 8.8 MG/DL (ref 8.6–10.5)
CHLORIDE SERPL-SCNC: 106 MMOL/L (ref 98–107)
CHOLEST SERPL-MCNC: 155 MG/DL (ref 0–200)
CO2 SERPL-SCNC: 27.8 MMOL/L (ref 22–29)
CREAT SERPL-MCNC: 0.77 MG/DL (ref 0.57–1)
EOSINOPHIL # BLD AUTO: 0.06 10*3/MM3 (ref 0–0.4)
EOSINOPHIL NFR BLD AUTO: 1 % (ref 0.3–6.2)
ERYTHROCYTE [DISTWIDTH] IN BLOOD BY AUTOMATED COUNT: 13.1 % (ref 12.3–15.4)
GLOBULIN SER CALC-MCNC: 2 GM/DL
GLUCOSE SERPL-MCNC: 77 MG/DL (ref 65–99)
HCT VFR BLD AUTO: 43 % (ref 34–46.6)
HDLC SERPL-MCNC: 66 MG/DL (ref 40–60)
HGB BLD-MCNC: 15.4 G/DL (ref 12–15.9)
IMM GRANULOCYTES # BLD AUTO: 0.01 10*3/MM3 (ref 0–0.05)
IMM GRANULOCYTES NFR BLD AUTO: 0.2 % (ref 0–0.5)
LDLC SERPL CALC-MCNC: 75 MG/DL (ref 0–100)
LYMPHOCYTES # BLD AUTO: 2.16 10*3/MM3 (ref 0.7–3.1)
LYMPHOCYTES NFR BLD AUTO: 35 % (ref 19.6–45.3)
MCH RBC QN AUTO: 31.2 PG (ref 26.6–33)
MCHC RBC AUTO-ENTMCNC: 35.8 G/DL (ref 31.5–35.7)
MCV RBC AUTO: 87.2 FL (ref 79–97)
MONOCYTES # BLD AUTO: 0.52 10*3/MM3 (ref 0.1–0.9)
MONOCYTES NFR BLD AUTO: 8.4 % (ref 5–12)
NEUTROPHILS # BLD AUTO: 3.38 10*3/MM3 (ref 1.7–7)
NEUTROPHILS NFR BLD AUTO: 54.8 % (ref 42.7–76)
NRBC BLD AUTO-RTO: 0 /100 WBC (ref 0–0.2)
PLATELET # BLD AUTO: 260 10*3/MM3 (ref 140–450)
POTASSIUM SERPL-SCNC: 4 MMOL/L (ref 3.5–5.2)
PROT SERPL-MCNC: 6.7 G/DL (ref 6–8.5)
RBC # BLD AUTO: 4.93 10*6/MM3 (ref 3.77–5.28)
SODIUM SERPL-SCNC: 141 MMOL/L (ref 136–145)
TRIGL SERPL-MCNC: 71 MG/DL (ref 0–150)
TSH SERPL DL<=0.005 MIU/L-ACNC: 1.23 UIU/ML (ref 0.27–4.2)
VLDLC SERPL CALC-MCNC: 14 MG/DL (ref 5–40)
WBC # BLD AUTO: 6.17 10*3/MM3 (ref 3.4–10.8)

## 2020-10-09 NOTE — PATIENT INSTRUCTIONS
MyPlate from USDA    MyPlate is an outline of a general healthy diet based on the 2010 Dietary Guidelines for Americans, from the U.S. Department of Agriculture (USDA). It sets guidelines for how much food you should eat from each food group based on your age, sex, and level of physical activity.  What are tips for following MyPlate?  To follow MyPlate recommendations:  · Eat a wide variety of fruits and vegetables, grains, and protein foods.  · Serve smaller portions and eat less food throughout the day.  · Limit portion sizes to avoid overeating.  · Enjoy your food.  · Get at least 150 minutes of exercise every week. This is about 30 minutes each day, 5 or more days per week.  It can be difficult to have every meal look like MyPlate. Think about MyPlate as eating guidelines for an entire day, rather than each individual meal.  Fruits and vegetables  · Make half of your plate fruits and vegetables.  · Eat many different colors of fruits and vegetables each day.  · For a 2,000 calorie daily food plan, eat:  ? 2½ cups of vegetables every day.  ? 2 cups of fruit every day.  · 1 cup is equal to:  ? 1 cup raw or cooked vegetables.  ? 1 cup raw fruit.  ? 1 medium-sized orange, apple, or banana.  ? 1 cup 100% fruit or vegetable juice.  ? 2 cups raw leafy greens, such as lettuce, spinach, or kale.  ? ½ cup dried fruit.  Grains  · One fourth of your plate should be grains.  · Make at least half of the grains you eat each day whole grains.  · For a 2,000 calorie daily food plan, eat 6 oz of grains every day.  · 1 oz is equal to:  ? 1 slice bread.  ? 1 cup cereal.  ? ½ cup cooked rice, cereal, or pasta.  Protein  · One fourth of your plate should be protein.  · Eat a wide variety of protein foods, including meat, poultry, fish, eggs, beans, nuts, and tofu.  · For a 2,000 calorie daily food plan, eat 5½ oz of protein every day.  · 1 oz is equal to:  ? 1 oz meat, poultry, or fish.  ? ¼ cup cooked beans.  ? 1 egg.  ? ½ oz nuts  or seeds.  ? 1 Tbsp peanut butter.  Dairy  · Drink fat-free or low-fat (1%) milk.  · Eat or drink dairy as a side to meals.  · For a 2,000 calorie daily food plan, eat or drink 3 cups of dairy every day.  · 1 cup is equal to:  ? 1 cup milk, yogurt, cottage cheese, or soy milk (soy beverage).  ? 2 oz processed cheese.  ? 1½ oz natural cheese.  Fats, oils, salt, and sugars  · Only small amounts of oils are recommended.  · Avoid foods that are high in calories and low in nutritional value (empty calories), like foods high in fat or added sugars.  · Choose foods that are low in salt (sodium). Choose foods that have less than 140 milligrams (mg) of sodium per serving.  · Drink water instead of sugary drinks. Drink enough water each day to keep your urine pale yellow.  Where to find support  · Work with your health care provider or a nutrition specialist (dietitian) to develop a customized eating plan that is right for you.  · Download an doc (mobile application) to help you track your daily food intake.  Where to find more information  · Go to ChooseMyPlate.gov for more information.  Summary  · MyPlate is a general guideline for healthy eating from the USDA. It is based on the 2010 Dietary Guidelines for Americans.  · In general, fruits and vegetables should take up ½ of your plate, grains should take up ¼ of your plate, and protein should take up ¼ of your plate.  This information is not intended to replace advice given to you by your health care provider. Make sure you discuss any questions you have with your health care provider.  Document Released: 01/06/2009 Document Revised: 05/21/2020 Document Reviewed: 03/19/2018  Elsevier Patient Education © 2020 Elsevier Inc.      MyPlate from USDA    MyPlate is an outline of a general healthy diet based on the 2010 Dietary Guidelines for Americans, from the U.S. Department of Agriculture (USDA). It sets guidelines for how much food you should eat from each food group based on  your age, sex, and level of physical activity.  What are tips for following MyPlate?  To follow MyPlate recommendations:  · Eat a wide variety of fruits and vegetables, grains, and protein foods.  · Serve smaller portions and eat less food throughout the day.  · Limit portion sizes to avoid overeating.  · Enjoy your food.  · Get at least 150 minutes of exercise every week. This is about 30 minutes each day, 5 or more days per week.  It can be difficult to have every meal look like MyPlate. Think about MyPlate as eating guidelines for an entire day, rather than each individual meal.  Fruits and vegetables  · Make half of your plate fruits and vegetables.  · Eat many different colors of fruits and vegetables each day.  · For a 2,000 calorie daily food plan, eat:  ? 2½ cups of vegetables every day.  ? 2 cups of fruit every day.  · 1 cup is equal to:  ? 1 cup raw or cooked vegetables.  ? 1 cup raw fruit.  ? 1 medium-sized orange, apple, or banana.  ? 1 cup 100% fruit or vegetable juice.  ? 2 cups raw leafy greens, such as lettuce, spinach, or kale.  ? ½ cup dried fruit.  Grains  · One fourth of your plate should be grains.  · Make at least half of the grains you eat each day whole grains.  · For a 2,000 calorie daily food plan, eat 6 oz of grains every day.  · 1 oz is equal to:  ? 1 slice bread.  ? 1 cup cereal.  ? ½ cup cooked rice, cereal, or pasta.  Protein  · One fourth of your plate should be protein.  · Eat a wide variety of protein foods, including meat, poultry, fish, eggs, beans, nuts, and tofu.  · For a 2,000 calorie daily food plan, eat 5½ oz of protein every day.  · 1 oz is equal to:  ? 1 oz meat, poultry, or fish.  ? ¼ cup cooked beans.  ? 1 egg.  ? ½ oz nuts or seeds.  ? 1 Tbsp peanut butter.  Dairy  · Drink fat-free or low-fat (1%) milk.  · Eat or drink dairy as a side to meals.  · For a 2,000 calorie daily food plan, eat or drink 3 cups of dairy every day.  · 1 cup is equal to:  ? 1 cup milk, yogurt,  cottage cheese, or soy milk (soy beverage).  ? 2 oz processed cheese.  ? 1½ oz natural cheese.  Fats, oils, salt, and sugars  · Only small amounts of oils are recommended.  · Avoid foods that are high in calories and low in nutritional value (empty calories), like foods high in fat or added sugars.  · Choose foods that are low in salt (sodium). Choose foods that have less than 140 milligrams (mg) of sodium per serving.  · Drink water instead of sugary drinks. Drink enough water each day to keep your urine pale yellow.  Where to find support  · Work with your health care provider or a nutrition specialist (dietitian) to develop a customized eating plan that is right for you.  · Download an doc (mobile application) to help you track your daily food intake.  Where to find more information  · Go to ChooseMyPlate.gov for more information.  Summary  · MyPlate is a general guideline for healthy eating from the USDA. It is based on the 2010 Dietary Guidelines for Americans.  · In general, fruits and vegetables should take up ½ of your plate, grains should take up ¼ of your plate, and protein should take up ¼ of your plate.  This information is not intended to replace advice given to you by your health care provider. Make sure you discuss any questions you have with your health care provider.  Document Released: 01/06/2009 Document Revised: 05/21/2020 Document Reviewed: 03/19/2018  Elsevier Patient Education © 2020 Elsevier Inc.

## 2020-11-30 DIAGNOSIS — I10 ESSENTIAL HYPERTENSION: ICD-10-CM

## 2020-12-01 NOTE — TELEPHONE ENCOUNTER
Last Office Visit: 10/8/2020  Next Office Visit: no future appt scheduled     Labs completed in past 6 months? yes  Labs completed in past year? yes    Last Refill Date: 10/8/2020  Quantity: 90  Refills: 0    Pharmacy:  [facility], [place], [place] - [address] [facility] [phone] [facility] [phone] FX

## 2020-12-10 RX ORDER — LISINOPRIL AND HYDROCHLOROTHIAZIDE 12.5; 1 MG/1; MG/1
1 TABLET ORAL DAILY
Qty: 90 TABLET | Refills: 0 | Status: SHIPPED | OUTPATIENT
Start: 2020-12-10 | End: 2021-03-05 | Stop reason: SDUPTHER

## 2021-03-05 DIAGNOSIS — I10 ESSENTIAL HYPERTENSION: ICD-10-CM

## 2021-03-05 RX ORDER — LISINOPRIL AND HYDROCHLOROTHIAZIDE 12.5; 1 MG/1; MG/1
1 TABLET ORAL DAILY
Qty: 90 TABLET | Refills: 0 | Status: SHIPPED | OUTPATIENT
Start: 2021-03-05 | End: 2021-06-04

## 2021-05-26 DIAGNOSIS — I10 ESSENTIAL HYPERTENSION: ICD-10-CM

## 2021-06-04 RX ORDER — LISINOPRIL AND HYDROCHLOROTHIAZIDE 12.5; 1 MG/1; MG/1
1 TABLET ORAL DAILY
Qty: 30 TABLET | Refills: 0 | Status: SHIPPED | OUTPATIENT
Start: 2021-06-04 | End: 2021-06-17 | Stop reason: SDUPTHER

## 2021-06-17 DIAGNOSIS — I10 ESSENTIAL HYPERTENSION: ICD-10-CM

## 2021-06-17 RX ORDER — LISINOPRIL AND HYDROCHLOROTHIAZIDE 12.5; 1 MG/1; MG/1
1 TABLET ORAL DAILY
Qty: 30 TABLET | Refills: 5 | Status: SHIPPED | OUTPATIENT
Start: 2021-06-17 | End: 2022-01-11

## 2022-01-11 DIAGNOSIS — I10 ESSENTIAL HYPERTENSION: ICD-10-CM

## 2022-01-11 RX ORDER — LISINOPRIL AND HYDROCHLOROTHIAZIDE 12.5; 1 MG/1; MG/1
1 TABLET ORAL DAILY
Qty: 30 TABLET | Refills: 0 | Status: SHIPPED | OUTPATIENT
Start: 2022-01-11 | End: 2022-01-18

## 2022-01-18 ENCOUNTER — OFFICE VISIT (OUTPATIENT)
Dept: INTERNAL MEDICINE | Facility: CLINIC | Age: 57
End: 2022-01-18

## 2022-01-18 ENCOUNTER — LAB (OUTPATIENT)
Dept: LAB | Facility: HOSPITAL | Age: 57
End: 2022-01-18

## 2022-01-18 VITALS
DIASTOLIC BLOOD PRESSURE: 88 MMHG | OXYGEN SATURATION: 96 % | RESPIRATION RATE: 16 BRPM | SYSTOLIC BLOOD PRESSURE: 128 MMHG | BODY MASS INDEX: 38.76 KG/M2 | WEIGHT: 197.4 LBS | HEART RATE: 93 BPM | TEMPERATURE: 97.7 F | HEIGHT: 60 IN

## 2022-01-18 DIAGNOSIS — Z00.00 ANNUAL PHYSICAL EXAM: Primary | ICD-10-CM

## 2022-01-18 DIAGNOSIS — Z13.220 LIPID SCREENING: ICD-10-CM

## 2022-01-18 DIAGNOSIS — Z12.4 CERVICAL CANCER SCREENING: ICD-10-CM

## 2022-01-18 DIAGNOSIS — Z12.31 SCREENING MAMMOGRAM FOR BREAST CANCER: ICD-10-CM

## 2022-01-18 DIAGNOSIS — Z13.29 THYROID DISORDER SCREEN: ICD-10-CM

## 2022-01-18 DIAGNOSIS — Z13.1 ENCOUNTER FOR SCREENING EXAMINATION FOR IMPAIRED GLUCOSE REGULATION AND DIABETES MELLITUS: ICD-10-CM

## 2022-01-18 DIAGNOSIS — I10 ESSENTIAL HYPERTENSION: ICD-10-CM

## 2022-01-18 DIAGNOSIS — Z12.11 COLON CANCER SCREENING: ICD-10-CM

## 2022-01-18 PROBLEM — U07.1 COVID-19: Status: ACTIVE | Noted: 2022-01-18

## 2022-01-18 LAB
ALBUMIN SERPL-MCNC: 4.1 G/DL (ref 3.5–5.2)
ALBUMIN/GLOB SERPL: 1.4 G/DL
ALP SERPL-CCNC: 88 U/L (ref 39–117)
ALT SERPL W P-5'-P-CCNC: 42 U/L (ref 1–33)
ANION GAP SERPL CALCULATED.3IONS-SCNC: 9.2 MMOL/L (ref 5–15)
AST SERPL-CCNC: 30 U/L (ref 1–32)
BASOPHILS # BLD AUTO: 0.06 10*3/MM3 (ref 0–0.2)
BASOPHILS NFR BLD AUTO: 0.8 % (ref 0–1.5)
BILIRUB BLD-MCNC: NEGATIVE MG/DL
BILIRUB SERPL-MCNC: 0.5 MG/DL (ref 0–1.2)
BUN SERPL-MCNC: 11 MG/DL (ref 6–20)
BUN/CREAT SERPL: 14.9 (ref 7–25)
CALCIUM SPEC-SCNC: 9.2 MG/DL (ref 8.6–10.5)
CHLORIDE SERPL-SCNC: 104 MMOL/L (ref 98–107)
CHOLEST SERPL-MCNC: 153 MG/DL (ref 0–200)
CLARITY, POC: CLEAR
CO2 SERPL-SCNC: 26.8 MMOL/L (ref 22–29)
COLOR UR: YELLOW
CREAT SERPL-MCNC: 0.74 MG/DL (ref 0.57–1)
DEPRECATED RDW RBC AUTO: 43.6 FL (ref 37–54)
EOSINOPHIL # BLD AUTO: 0.21 10*3/MM3 (ref 0–0.4)
EOSINOPHIL NFR BLD AUTO: 2.7 % (ref 0.3–6.2)
ERYTHROCYTE [DISTWIDTH] IN BLOOD BY AUTOMATED COUNT: 13.6 % (ref 12.3–15.4)
EXPIRATION DATE: NORMAL
GFR SERPL CREATININE-BSD FRML MDRD: 81 ML/MIN/1.73
GLOBULIN UR ELPH-MCNC: 2.9 GM/DL
GLUCOSE SERPL-MCNC: 87 MG/DL (ref 65–99)
GLUCOSE UR STRIP-MCNC: NEGATIVE MG/DL
HCT VFR BLD AUTO: 46.6 % (ref 34–46.6)
HDLC SERPL-MCNC: 64 MG/DL (ref 40–60)
HGB BLD-MCNC: 15.8 G/DL (ref 12–15.9)
IMM GRANULOCYTES # BLD AUTO: 0.02 10*3/MM3 (ref 0–0.05)
IMM GRANULOCYTES NFR BLD AUTO: 0.3 % (ref 0–0.5)
KETONES UR QL: NEGATIVE
LDLC SERPL CALC-MCNC: 77 MG/DL (ref 0–100)
LDLC/HDLC SERPL: 1.21 {RATIO}
LEUKOCYTE EST, POC: NEGATIVE
LYMPHOCYTES # BLD AUTO: 1.64 10*3/MM3 (ref 0.7–3.1)
LYMPHOCYTES NFR BLD AUTO: 21.1 % (ref 19.6–45.3)
Lab: NORMAL
MCH RBC QN AUTO: 30.3 PG (ref 26.6–33)
MCHC RBC AUTO-ENTMCNC: 33.9 G/DL (ref 31.5–35.7)
MCV RBC AUTO: 89.4 FL (ref 79–97)
MONOCYTES # BLD AUTO: 0.72 10*3/MM3 (ref 0.1–0.9)
MONOCYTES NFR BLD AUTO: 9.3 % (ref 5–12)
NEUTROPHILS NFR BLD AUTO: 5.12 10*3/MM3 (ref 1.7–7)
NEUTROPHILS NFR BLD AUTO: 65.8 % (ref 42.7–76)
NITRITE UR-MCNC: NEGATIVE MG/ML
NRBC BLD AUTO-RTO: 0 /100 WBC (ref 0–0.2)
PH UR: 7 [PH] (ref 5–8)
PLATELET # BLD AUTO: 297 10*3/MM3 (ref 140–450)
PMV BLD AUTO: 9.6 FL (ref 6–12)
POTASSIUM SERPL-SCNC: 3.7 MMOL/L (ref 3.5–5.2)
PROT SERPL-MCNC: 7 G/DL (ref 6–8.5)
PROT UR STRIP-MCNC: NEGATIVE MG/DL
RBC # BLD AUTO: 5.21 10*6/MM3 (ref 3.77–5.28)
RBC # UR STRIP: NEGATIVE /UL
SODIUM SERPL-SCNC: 140 MMOL/L (ref 136–145)
SP GR UR: 1.01 (ref 1–1.03)
TRIGL SERPL-MCNC: 59 MG/DL (ref 0–150)
TSH SERPL DL<=0.05 MIU/L-ACNC: 1.9 UIU/ML (ref 0.27–4.2)
UROBILINOGEN UR QL: NORMAL
VLDLC SERPL-MCNC: 12 MG/DL (ref 5–40)
WBC NRBC COR # BLD: 7.77 10*3/MM3 (ref 3.4–10.8)

## 2022-01-18 PROCEDURE — 85025 COMPLETE CBC W/AUTO DIFF WBC: CPT | Performed by: NURSE PRACTITIONER

## 2022-01-18 PROCEDURE — 99396 PREV VISIT EST AGE 40-64: CPT | Performed by: NURSE PRACTITIONER

## 2022-01-18 PROCEDURE — 80053 COMPREHEN METABOLIC PANEL: CPT | Performed by: NURSE PRACTITIONER

## 2022-01-18 PROCEDURE — 81003 URINALYSIS AUTO W/O SCOPE: CPT | Performed by: NURSE PRACTITIONER

## 2022-01-18 PROCEDURE — 80061 LIPID PANEL: CPT | Performed by: NURSE PRACTITIONER

## 2022-01-18 PROCEDURE — 84443 ASSAY THYROID STIM HORMONE: CPT | Performed by: NURSE PRACTITIONER

## 2022-01-18 RX ORDER — LISINOPRIL AND HYDROCHLOROTHIAZIDE 20; 12.5 MG/1; MG/1
1 TABLET ORAL DAILY
Qty: 90 TABLET | Refills: 1 | Status: SHIPPED | OUTPATIENT
Start: 2022-01-18 | End: 2022-06-20

## 2022-01-18 RX ORDER — PHENOL 1.4 %
600 AEROSOL, SPRAY (ML) MUCOUS MEMBRANE DAILY
COMMUNITY

## 2022-01-18 NOTE — PATIENT INSTRUCTIONS
MyPlate from USDA    MyPlate is an outline of a general healthy diet based on the 2010 Dietary Guidelines for Americans, from the U.S. Department of Agriculture (USDA). It sets guidelines for how much food you should eat from each food group based on your age, sex, and level of physical activity.  What are tips for following MyPlate?  To follow MyPlate recommendations:  · Eat a wide variety of fruits and vegetables, grains, and protein foods.  · Serve smaller portions and eat less food throughout the day.  · Limit portion sizes to avoid overeating.  · Enjoy your food.  · Get at least 150 minutes of exercise every week. This is about 30 minutes each day, 5 or more days per week.  It can be difficult to have every meal look like MyPlate. Think about MyPlate as eating guidelines for an entire day, rather than each individual meal.  Fruits and vegetables  · Make half of your plate fruits and vegetables.  · Eat many different colors of fruits and vegetables each day.  · For a 2,000 calorie daily food plan, eat:  ? 2½ cups of vegetables every day.  ? 2 cups of fruit every day.  · 1 cup is equal to:  ? 1 cup raw or cooked vegetables.  ? 1 cup raw fruit.  ? 1 medium-sized orange, apple, or banana.  ? 1 cup 100% fruit or vegetable juice.  ? 2 cups raw leafy greens, such as lettuce, spinach, or kale.  ? ½ cup dried fruit.  Grains  · One fourth of your plate should be grains.  · Make at least half of the grains you eat each day whole grains.  · For a 2,000 calorie daily food plan, eat 6 oz of grains every day.  · 1 oz is equal to:  ? 1 slice bread.  ? 1 cup cereal.  ? ½ cup cooked rice, cereal, or pasta.  Protein  · One fourth of your plate should be protein.  · Eat a wide variety of protein foods, including meat, poultry, fish, eggs, beans, nuts, and tofu.  · For a 2,000 calorie daily food plan, eat 5½ oz of protein every day.  · 1 oz is equal to:  ? 1 oz meat, poultry, or fish.  ? ¼ cup cooked beans.  ? 1 egg.  ? ½ oz nuts  or seeds.  ? 1 Tbsp peanut butter.  Dairy  · Drink fat-free or low-fat (1%) milk.  · Eat or drink dairy as a side to meals.  · For a 2,000 calorie daily food plan, eat or drink 3 cups of dairy every day.  · 1 cup is equal to:  ? 1 cup milk, yogurt, cottage cheese, or soy milk (soy beverage).  ? 2 oz processed cheese.  ? 1½ oz natural cheese.  Fats, oils, salt, and sugars  · Only small amounts of oils are recommended.  · Avoid foods that are high in calories and low in nutritional value (empty calories), like foods high in fat or added sugars.  · Choose foods that are low in salt (sodium). Choose foods that have less than 140 milligrams (mg) of sodium per serving.  · Drink water instead of sugary drinks. Drink enough water each day to keep your urine pale yellow.  Where to find support  · Work with your health care provider or a nutrition specialist (dietitian) to develop a customized eating plan that is right for you.  · Download an doc (mobile application) to help you track your daily food intake.  Where to find more information  · Go to ChooseMyPlate.gov for more information.  Summary  · MyPlate is a general guideline for healthy eating from the USDA. It is based on the 2010 Dietary Guidelines for Americans.  · In general, fruits and vegetables should take up ½ of your plate, grains should take up ¼ of your plate, and protein should take up ¼ of your plate.  This information is not intended to replace advice given to you by your health care provider. Make sure you discuss any questions you have with your health care provider.  Document Revised: 05/21/2020 Document Reviewed: 03/19/2018  Elsevier Patient Education © 2021 Elsevier Inc.      Advance Care Planning and Advance Directives     You make decisions on a daily basis - decisions about where you want to live, your career, your home, your life. Perhaps one of the most important decisions you face is your choice for future medical care. Take time to talk with  your family and your healthcare team and start planning today.  Advance Care Planning is a process that can help you:  · Understand possible future healthcare decisions in light of your own experiences  · Reflect on those decision in light of your goals and values  · Discuss your decisions with those closest to you and the healthcare professionals that care for you  · Make a plan by creating a document that reflects your wishes    Surrogate Decision Maker  In the event of a medical emergency, which has left you unable to communicate or to make your own decisions, you would need someone to make decisions for you.  It is important to discuss your preferences for medical treatment with this person while you are in good health.     Qualities of a surrogate decision maker:  • Willing to take on this role and responsibility  • Knows what you want for future medical care  • Willing to follow your wishes even if they don't agree with them  • Able to make difficult medical decisions under stressful circumstances    Advance Directives  These are legal documents you can create that will guide your healthcare team and decision maker(s) when needed. These documents can be stored in the electronic medical record.    · Living Will - a legal document to guide your care if you have a terminal condition or a serious illness and are unable to communicate. States vary by statute in document names/types, but most forms may include one or more of the following:        -  Directions regarding life-prolonging treatments        -  Directions regarding artificially provided nutrition/hydration        -  Choosing a healthcare decision maker        -  Direction regarding organ/tissue donation    · Durable Power of  for Healthcare - this document names an -in-fact to make medical decisions for you, but it may also allow this person to make personal and financial decisions for you. Please seek the advice of an  if you  need this type of document.    **Advance Directives are not required and no one may discriminate against you if you do not sign one.    Medical Orders  Many states allow specific forms/orders signed by your physician to record your wishes for medical treatment in your current state of health. This form, signed in personal communication with your physician, addresses resuscitation and other medical interventions that you may or may not want.      For more information or to schedule a time with a [facility] Advance Care Planning Facilitator contact: [facility]/ACP or call [phone] and someone will contact you directly.  ACP information pamphlet given to the patient.  ACP information provided on the AVS.

## 2022-01-18 NOTE — PROGRESS NOTES
"Chief Complaint  Annual Exam    Subjective          Natalie Kan presents to [facility]  History of Present Illness  Patient is here today for annual and follow-up of chronic problems.    Derm- no  opthal-y  Dental - n- encouraged  Healthy diet and exercise-N    Patient has hypertension blood pressure- mildly elevated today  No chest pain shortness of breath headache dizziness visual changes palpitation edema or syncope.  Blood pressure at home stays in caution zone     Patient has obesity class II with serious comorbidities due to excess calories based on BMI. She is up 9#.    Patient has osteopenia of the lumbar spine and left femur neck-Caltrate D.      Patient has SHY- not using CPAP- risk and benefits reviewed and pt v/u.     Patient has compliance concern-due for colorectal cancer screening  COVID-19 vaccine-no risk and benefits reviewed and pt v/u.   Shingles vaccine  Pap smear due   Influenza vaccine- no risk and benefits reviewed and pt v/u.     DEXA bone scan completed 7/20  Last mammogram 7/20 new mammogram ordered but not completed    She had covid in 11/20 and taste and smell and different.        Review of systems no headache dizziness fever sweats chills change of appetite no chest pain shortness of breath edema no abdominal pain no change in bowel bladder habits no lumps bumps or rashes  Objective   Vital Signs:   /88 (BP Location: Right arm, Patient Position: Sitting, Cuff Size: Adult)   Pulse 93   Temp 97.7 °F (36.5 °C) (Temporal)   Resp 16   Ht 152 cm (59.84\")   Wt 89.5 kg (197 lb 6.4 oz)   SpO2 96%   BMI 38.75 kg/m²     Physical Exam  Vitals and nursing note reviewed. Exam conducted with a chaperone present.   Constitutional:       Appearance: She is well-developed.   HENT:      Head: Normocephalic and atraumatic.      Right Ear: External ear normal.      Left Ear: External ear normal.      Nose: Nose normal.   Eyes:      " General: No scleral icterus.        Right eye: No discharge.         Left eye: No discharge.      Conjunctiva/sclera: Conjunctivae normal.      Pupils: Pupils are equal, round, and reactive to light.   Neck:      Thyroid: No thyromegaly.      Vascular: No carotid bruit.   Cardiovascular:      Rate and Rhythm: Normal rate and regular rhythm.      Heart sounds: Normal heart sounds. No murmur heard.  No friction rub. No gallop.    Pulmonary:      Effort: Pulmonary effort is normal.      Breath sounds: Normal breath sounds.   Chest:   Breasts:      Right: Normal. No axillary adenopathy or supraclavicular adenopathy.      Left: Normal. No axillary adenopathy or supraclavicular adenopathy.       Abdominal:      General: Bowel sounds are normal. There is no distension.      Palpations: Abdomen is soft. There is no mass.      Tenderness: There is no abdominal tenderness. There is no guarding or rebound.      Hernia: No hernia is present. There is no hernia in the left inguinal area or right inguinal area.   Genitourinary:     Exam position: Supine.      Vagina: Normal.      Cervix: Normal.      Uterus: Normal.       Adnexa: Right adnexa normal and left adnexa normal.   Musculoskeletal:         General: Normal range of motion.      Cervical back: Normal range of motion.   Lymphadenopathy:      Head:      Right side of head: No submental, submandibular, tonsillar, preauricular, posterior auricular or occipital adenopathy.      Left side of head: No submental, submandibular, tonsillar, preauricular, posterior auricular or occipital adenopathy.      Cervical: No cervical adenopathy.      Upper Body:      Right upper body: No supraclavicular, axillary, pectoral or epitrochlear adenopathy.      Left upper body: No supraclavicular, axillary, pectoral or epitrochlear adenopathy.      Lower Body: No right inguinal adenopathy. No left inguinal adenopathy.   Skin:     General: Skin is warm and dry.      Capillary Refill: Capillary  refill takes 2 to 3 seconds.   Neurological:      Mental Status: She is alert and oriented to person, place, and time.      Deep Tendon Reflexes: Reflexes normal.   Psychiatric:         Behavior: Behavior normal.         Thought Content: Thought content normal.         Judgment: Judgment normal.        Result Review :                 Assessment and Plan    Diagnoses and all orders for this visit:    1. Annual physical exam (Primary)  -     CBC & Differential; Future  -     CBC & Differential    2. Encounter for screening examination for impaired glucose regulation and diabetes mellitus  -     Comprehensive Metabolic Panel; Future  -     POC Urinalysis Dipstick, Automated; Future  -     POC Urinalysis Dipstick, Automated  -     Comprehensive Metabolic Panel    3. Lipid screening  -     Lipid Panel; Future  -     Lipid Panel    4. Thyroid disorder screen  -     TSH; Future  -     TSH    5. Colon cancer screening    6. Cervical cancer screening  -     Liquid-based Pap Smear, Screening; Future    7. Essential hypertension  -     lisinopril-hydrochlorothiazide (Zestoretic) 20-12.5 MG per tablet; Take 1 tablet by mouth Daily.  Dispense: 90 tablet; Refill: 1    8. Screening mammogram for breast cancer  -     Mammo Screening Digital Tomosynthesis Bilateral With CAD; Future          Discussed the patient's BMI with her. BMI is above normal parameters. Recommendations include: educational material.  Increased lisinopril hctz to 20/12.5mg daily.    AWV: na  A1C: No results found for: HGBA1C   ACP: Advance Care Planning   ACP discussion was held with the patient during this visit. Patient does not have an advance directive, information provided.   Mammogram: ordered  Colonoscopy: ordered  Patient education regarding the importance of avoidance of texting while driving and the need for wearing seatbelt.  Use of sunscreen, use of helmets while on bikes.  The appropriate amounts of sleep and H20 consumption per day was reviewed  with pt.  The need for healthy well-balanced diet based off the food guide pyramid and avoidance of skipping meals along with following a NCS diet with appropriate amounts of fats, protein, and carbohydrate and low sodium diet. Encouraging 30minutes of cardio 5d weekly and muscle strengthening 3x weekly.     Follow Up   Return in about 6 months (around 7/18/2022) for fasting.  Patient was given instructions and counseling regarding her condition or for health maintenance advice. Please see specific information pulled into the AVS if appropriate.     RTC/call  If symptoms worsen  Meds MOA and SE's reviewed and pt v/u

## 2022-01-26 ENCOUNTER — TELEPHONE (OUTPATIENT)
Dept: INTERNAL MEDICINE | Facility: CLINIC | Age: 57
End: 2022-01-26

## 2022-01-26 DIAGNOSIS — R87.610 ATYPICAL SQUAMOUS CELLS OF UNDETERMINED SIGNIFICANCE ON CYTOLOGIC SMEAR OF CERVIX (ASC-US): Primary | ICD-10-CM

## 2022-01-26 NOTE — TELEPHONE ENCOUNTER
Please call Natalie and let her know that her Pap smear noted atypical squamous cells of undetermined significance.  I would like for her to see gynecology to follow.  Please let me know if she is agreeable.

## 2022-01-27 ENCOUNTER — TELEPHONE (OUTPATIENT)
Dept: INTERNAL MEDICINE | Facility: CLINIC | Age: 57
End: 2022-01-27

## 2022-01-27 NOTE — TELEPHONE ENCOUNTER
Pt advised of clinical message from PCP. Pt is in agreement to see gynecology. Advised once referral is placed she will be receiving call from scheduling to get time, date for appointment.

## 2022-01-27 NOTE — TELEPHONE ENCOUNTER
Caller: Natalie Kan    Relationship: Self    Best call back number:     Caller requesting test results     What test was performed: PHYSICAL    When was the test performed: 352168    Where was the test performed: OFFICE    Additional notes: PLEASE CALL TO ADVISE OF RECENT PAP AND REFERRAL TO GYN

## 2022-01-28 NOTE — TELEPHONE ENCOUNTER
Spoke to pt, advised pap smear results were abnormal with atypical squamous cells, so the referral to Gynecology they will do a re-peat pap smear, with possible colposcopy if needed. The specialist will take a better look at the cells to confirm if they are of any concern. Currently the referral requires a authorization from insurance, once approved our Referrals team will contact her in order to get that scheduled.  Good verbal understanding.

## 2022-02-03 PROCEDURE — U0004 COV-19 TEST NON-CDC HGH THRU: HCPCS | Performed by: NURSE PRACTITIONER

## 2022-02-07 ENCOUNTER — TELEMEDICINE (OUTPATIENT)
Dept: INTERNAL MEDICINE | Facility: CLINIC | Age: 57
End: 2022-02-07

## 2022-02-07 DIAGNOSIS — J06.9 UPPER RESPIRATORY TRACT INFECTION, UNSPECIFIED TYPE: Primary | ICD-10-CM

## 2022-02-07 PROCEDURE — U0004 COV-19 TEST NON-CDC HGH THRU: HCPCS | Performed by: NURSE PRACTITIONER

## 2022-02-07 PROCEDURE — 99213 OFFICE O/P EST LOW 20 MIN: CPT | Performed by: NURSE PRACTITIONER

## 2022-02-07 RX ORDER — SUMATRIPTAN 100 MG/1
TABLET, FILM COATED ORAL
Qty: 7 TABLET | Refills: 0 | Status: SHIPPED | OUTPATIENT
Start: 2022-02-07 | End: 2023-01-12

## 2022-02-07 RX ORDER — AZITHROMYCIN 250 MG/1
TABLET, FILM COATED ORAL
Qty: 6 TABLET | Refills: 0 | Status: SHIPPED | OUTPATIENT
Start: 2022-02-07 | End: 2022-02-28

## 2022-02-08 LAB — SARS-COV-2 RNA NOSE QL NAA+PROBE: NOT DETECTED

## 2022-02-28 ENCOUNTER — OFFICE VISIT (OUTPATIENT)
Dept: OBSTETRICS AND GYNECOLOGY | Facility: CLINIC | Age: 57
End: 2022-02-28

## 2022-02-28 VITALS — WEIGHT: 196 LBS | BODY MASS INDEX: 38.48 KG/M2 | HEIGHT: 60 IN

## 2022-02-28 DIAGNOSIS — N95.2 ATROPHY OF VAGINA: ICD-10-CM

## 2022-02-28 DIAGNOSIS — R87.610 ASCUS OF CERVIX WITH NEGATIVE HIGH RISK HPV: Primary | ICD-10-CM

## 2022-02-28 PROCEDURE — 99213 OFFICE O/P EST LOW 20 MIN: CPT | Performed by: NURSE PRACTITIONER

## 2022-02-28 RX ORDER — ESTRADIOL 10 UG/1
1 INSERT VAGINAL 2 TIMES WEEKLY
Qty: 8 TABLET | Refills: 11 | Status: SHIPPED | OUTPATIENT
Start: 2022-02-28

## 2022-02-28 NOTE — PROGRESS NOTES
Chief Complaint  Natalie Kan is a 57 y.o.  female presenting for Gynecologic Exam (ASCUS on PAP with PCP)    History of Present Illness  Very pleasant 58yo woman, referred by her PCP for ASCUS pap, - NEG for HR HPV pool.  Her previous pap  was neg/ normal, as all her paps have always been.  (The most recent pap was her first abnormal pap ever.)  No vaginitis sx.      The following portions of the patient's history were reviewed and updated as appropriate: allergies, current medications, past family history, past medical history, past social history, past surgical history and problem list.    Allergies   Allergen Reactions   • Augmentin [Amoxicillin-Pot Clavulanate] GI Intolerance         Current Outpatient Medications:   •  calcium carbonate (OS-TORREY) 600 MG tablet, Take 600 mg by mouth Daily., Disp: , Rfl:   •  lisinopril-hydrochlorothiazide (Zestoretic) 20-12.5 MG per tablet, Take 1 tablet by mouth Daily., Disp: 90 tablet, Rfl: 1  •  Multiple Vitamins-Minerals (MULTIVITAMIN ADULT PO), Take  by mouth., Disp: , Rfl:   •  SUMAtriptan (IMITREX) 100 MG tablet, Take 1 tablet daily as needed for migraine., Disp: 7 tablet, Rfl: 0  •  estradiol (Yuvafem) 10 MCG tablet vaginal tablet, Insert 1 tablet into the vagina 2 (Two) Times a Week., Disp: 8 tablet, Rfl: 11    Past Medical History:   Diagnosis Date   • Abnormal Pap smear of cervix     ASCUS   • Contraception management     Nml PAP with HPV neg 2013   • Dysfunctional uterine bleeding    • History of prior pregnancies     A1   • Hypertension    • Malignant neoplasm (HCC)     SQUAMOUS    • Migraine    • Non-healing skin lesion    • SOB (shortness of breath)    • Uterine leiomyoma         Past Surgical History:   Procedure Laterality Date   • APPENDECTOMY     •  SECTION     •  SECTION     • MOHS SURGERY      micrographic surgery face right side - due to squamous cell carcinoma   • WISDOM TOOTH EXTRACTION    "      Objective  Ht 152.4 cm (60\")   Wt 88.9 kg (196 lb)   Breastfeeding No   BMI 38.28 kg/m²     Physical Exam  Exam conducted with a chaperone present.   Constitutional:       Appearance: Normal appearance.   Abdominal:      Palpations: Abdomen is soft. There is no mass.      Tenderness: There is no abdominal tenderness.   Genitourinary:     General: Normal vulva.      Labia:         Right: No lesion.         Left: No lesion.       Vagina: Erythema present. No vaginal discharge.      Cervix: No cervical motion tenderness, discharge, lesion or erythema.      Uterus: Not enlarged and not tender.       Adnexa:         Right: No mass or tenderness.          Left: No mass or tenderness.        Rectum: Normal.      Comments: Moderate atrophic erythema, but no abnormal discharge.  Anus appears wnl.  (No rectal exam performed.)  Musculoskeletal:         General: No swelling or deformity.   Skin:     General: Skin is warm and dry.   Neurological:      Mental Status: She is oriented to person, place, and time.   Psychiatric:         Mood and Affect: Mood normal.         Behavior: Behavior normal.         Assessment/Plan   Diagnoses and all orders for this visit:    1. ASCUS of cervix with negative high risk HPV (Primary)    2. Atrophy of vagina    Other orders  -     estradiol (Yuvafem) 10 MCG tablet vaginal tablet; Insert 1 tablet into the vagina 2 (Two) Times a Week.  Dispense: 8 tablet; Refill: 11      Couns in-depth re: stages of abnormal paps, rates of progression vs regression, role of vaginal estrogen in helping the tissue be healthier and prevent infections.  Reassurance re: given the HPV was NEGATIVE, the atypia is most likely due to lack of estrogen/ atrophy of the mucosa.  And bacterial or fungal infections (kandis. If chronic) can contribute to atypia.      Procedures            Return for Annual physical in late Jan / early Feb.    Susan Welch, APRN  02/28/2022  "

## 2022-03-02 ENCOUNTER — PATIENT ROUNDING (BHMG ONLY) (OUTPATIENT)
Dept: OBSTETRICS AND GYNECOLOGY | Facility: CLINIC | Age: 57
End: 2022-03-02

## 2022-03-02 NOTE — PROGRESS NOTES
A [employer] message has been sent to the patient for PATIENT ROUNDING with [facility].

## 2022-06-20 DIAGNOSIS — I10 ESSENTIAL HYPERTENSION: ICD-10-CM

## 2022-06-20 RX ORDER — LISINOPRIL AND HYDROCHLOROTHIAZIDE 20; 12.5 MG/1; MG/1
TABLET ORAL
Qty: 90 TABLET | Refills: 0 | Status: SHIPPED | OUTPATIENT
Start: 2022-06-20 | End: 2022-07-13

## 2022-07-13 DIAGNOSIS — I10 ESSENTIAL HYPERTENSION: ICD-10-CM

## 2022-07-13 RX ORDER — LISINOPRIL AND HYDROCHLOROTHIAZIDE 20; 12.5 MG/1; MG/1
TABLET ORAL
Qty: 90 TABLET | Refills: 0 | Status: SHIPPED | OUTPATIENT
Start: 2022-07-13 | End: 2023-01-09 | Stop reason: SDUPTHER

## 2022-07-15 ENCOUNTER — APPOINTMENT (OUTPATIENT)
Dept: MAMMOGRAPHY | Facility: HOSPITAL | Age: 57
End: 2022-07-15

## 2022-08-01 ENCOUNTER — APPOINTMENT (OUTPATIENT)
Dept: MAMMOGRAPHY | Facility: HOSPITAL | Age: 57
End: 2022-08-01

## 2022-08-25 ENCOUNTER — HOSPITAL ENCOUNTER (OUTPATIENT)
Dept: MAMMOGRAPHY | Facility: HOSPITAL | Age: 57
Discharge: HOME | End: 2022-08-25
Admitting: NURSE PRACTITIONER

## 2022-08-25 DIAGNOSIS — Z12.31 SCREENING MAMMOGRAM FOR BREAST CANCER: ICD-10-CM

## 2022-08-25 PROCEDURE — 77063 BREAST TOMOSYNTHESIS BI: CPT

## 2022-08-25 PROCEDURE — 77067 SCR MAMMO BI INCL CAD: CPT | Performed by: RADIOLOGY

## 2022-08-25 PROCEDURE — 77063 BREAST TOMOSYNTHESIS BI: CPT | Performed by: RADIOLOGY

## 2022-08-25 PROCEDURE — 77067 SCR MAMMO BI INCL CAD: CPT

## 2022-09-06 ENCOUNTER — OFFICE VISIT (OUTPATIENT)
Dept: OBSTETRICS AND GYNECOLOGY | Facility: CLINIC | Age: 57
End: 2022-09-06

## 2022-09-06 VITALS
WEIGHT: 195.8 LBS | DIASTOLIC BLOOD PRESSURE: 84 MMHG | BODY MASS INDEX: 38.44 KG/M2 | HEIGHT: 60 IN | SYSTOLIC BLOOD PRESSURE: 142 MMHG

## 2022-09-06 DIAGNOSIS — Z09 FOLLOW-UP EXAM: ICD-10-CM

## 2022-09-06 DIAGNOSIS — N95.2 ATROPHY OF VAGINA: Primary | ICD-10-CM

## 2022-09-06 PROCEDURE — 99213 OFFICE O/P EST LOW 20 MIN: CPT | Performed by: NURSE PRACTITIONER

## 2022-09-06 RX ORDER — FEXOFENADINE HCL 180 MG/1
180 TABLET ORAL DAILY
COMMUNITY
End: 2023-01-12 | Stop reason: SDUPTHER

## 2022-09-06 NOTE — PROGRESS NOTES
Chief Complaint  Natalie Kan is a 57 y.o.  female presenting for Follow-up (Follow-up after starting vaginal estrogen therapy for vaginal atrophy.)    History of Present Illness  Natalie is a 58yo woman, here today for follow up of vaginal atrophy.  She has been using the Yuvafem faithfully twice/ week.  She has no physical side effects from the medication, and she thinks it has helped her local symptoms.  But, c/o being emotionally labile and intermittently tearful.  She is questioning whether these sx could be related to the local vaginal estrogen tx.  (Denies any other changes, but she does have a FamHx of depression & anxiety.)    The following portions of the patient's history were reviewed and updated as appropriate: allergies, current medications, past family history, past medical history, past social history, past surgical history and problem list.    Allergies   Allergen Reactions   • Augmentin [Amoxicillin-Pot Clavulanate] GI Intolerance         Current Outpatient Medications:   •  calcium carbonate (OS-TORREY) 600 MG tablet, Take 600 mg by mouth Daily., Disp: , Rfl:   •  estradiol (Yuvafem) 10 MCG tablet vaginal tablet, Insert 1 tablet into the vagina 2 (Two) Times a Week., Disp: 8 tablet, Rfl: 11  •  fexofenadine (ALLEGRA) 180 MG tablet, Take 180 mg by mouth Daily., Disp: , Rfl:   •  lisinopril-hydrochlorothiazide (PRINZIDE,ZESTORETIC) 20-12.5 MG per tablet, TAKE 1 TABLET BY MOUTH EVERY DAY, Disp: 90 tablet, Rfl: 0  •  Multiple Vitamins-Minerals (MULTIVITAMIN ADULT PO), Take  by mouth., Disp: , Rfl:   •  SUMAtriptan (IMITREX) 100 MG tablet, Take 1 tablet daily as needed for migraine., Disp: 7 tablet, Rfl: 0  •  Valerian Root 450 MG capsule, Take 1 capsule by mouth Every Night., Disp: , Rfl:     Past Medical History:   Diagnosis Date   • Abnormal Pap smear of cervix     ASCUS   • Contraception management     Nml PAP with HPV neg 2013   • Dysfunctional uterine bleeding    • History of prior  "pregnancies     A1   • Hypertension    • Malignant neoplasm (HCC)     SQUAMOUS    • Migraine    • Non-healing skin lesion    • SOB (shortness of breath)    • Uterine leiomyoma         Past Surgical History:   Procedure Laterality Date   • APPENDECTOMY     •  SECTION     •  SECTION     • MOHS SURGERY      micrographic surgery face right side - due to squamous cell carcinoma   • WISDOM TOOTH EXTRACTION         Objective  /84   Ht 152.4 cm (60\")   Wt 88.8 kg (195 lb 12.8 oz)   Breastfeeding No   BMI 38.24 kg/m²     Physical Exam  Exam conducted with a chaperone present.   Constitutional:       Appearance: Normal appearance.   Abdominal:      Palpations: Abdomen is soft. There is no mass.      Tenderness: There is no abdominal tenderness.   Genitourinary:     General: Normal vulva.      Labia:         Right: No rash, tenderness or lesion.         Left: No rash, tenderness or lesion.       Vagina: Normal. No vaginal discharge or erythema.      Cervix: No cervical motion tenderness, discharge, lesion or erythema.      Uterus: Normal. Not enlarged and not tender.       Adnexa: Right adnexa normal and left adnexa normal.        Right: No mass or tenderness.          Left: No mass or tenderness.        Rectum: Normal.      Comments: Anus appears wnl.  (No rectal exam performed.)        Assessment/Plan   Diagnoses and all orders for this visit:    1. Atrophy of vagina (Primary)    2. Follow-up exam    Atrophy is markedly improved.  She was couns to call me promptly prn persistent or worsening sx.  She denies any SI.      Procedures            Return for  Annual physical.    Susan Welch, APRN  2022  " 8

## 2022-09-24 PROCEDURE — U0004 COV-19 TEST NON-CDC HGH THRU: HCPCS | Performed by: NURSE PRACTITIONER

## 2023-01-09 DIAGNOSIS — I10 ESSENTIAL HYPERTENSION: ICD-10-CM

## 2023-01-10 RX ORDER — LISINOPRIL AND HYDROCHLOROTHIAZIDE 20; 12.5 MG/1; MG/1
1 TABLET ORAL DAILY
Qty: 90 TABLET | Refills: 0 | Status: SHIPPED | OUTPATIENT
Start: 2023-01-10 | End: 2023-01-12 | Stop reason: SDUPTHER

## 2023-01-12 ENCOUNTER — LAB (OUTPATIENT)
Dept: LAB | Facility: HOSPITAL | Age: 58
End: 2023-01-12
Payer: COMMERCIAL

## 2023-01-12 ENCOUNTER — OFFICE VISIT (OUTPATIENT)
Dept: FAMILY MEDICINE CLINIC | Facility: CLINIC | Age: 58
End: 2023-01-12
Payer: COMMERCIAL

## 2023-01-12 VITALS
DIASTOLIC BLOOD PRESSURE: 88 MMHG | HEART RATE: 68 BPM | BODY MASS INDEX: 38.76 KG/M2 | TEMPERATURE: 97.3 F | SYSTOLIC BLOOD PRESSURE: 138 MMHG | OXYGEN SATURATION: 97 % | WEIGHT: 197.4 LBS | HEIGHT: 60 IN

## 2023-01-12 DIAGNOSIS — Z79.899 OTHER LONG TERM (CURRENT) DRUG THERAPY: ICD-10-CM

## 2023-01-12 DIAGNOSIS — G43.909 MIGRAINE WITHOUT STATUS MIGRAINOSUS, NOT INTRACTABLE, UNSPECIFIED MIGRAINE TYPE: Primary | ICD-10-CM

## 2023-01-12 DIAGNOSIS — E55.9 VITAMIN D DEFICIENCY: ICD-10-CM

## 2023-01-12 DIAGNOSIS — Z78.0 POST-MENOPAUSAL: ICD-10-CM

## 2023-01-12 DIAGNOSIS — I10 ESSENTIAL HYPERTENSION: ICD-10-CM

## 2023-01-12 DIAGNOSIS — J30.2 SEASONAL ALLERGIES: ICD-10-CM

## 2023-01-12 DIAGNOSIS — Z12.11 SCREENING FOR COLON CANCER: ICD-10-CM

## 2023-01-12 DIAGNOSIS — F51.01 PRIMARY INSOMNIA: ICD-10-CM

## 2023-01-12 DIAGNOSIS — R21 RASH: ICD-10-CM

## 2023-01-12 DIAGNOSIS — C44.90 NONMELANOMA SKIN CANCER: ICD-10-CM

## 2023-01-12 PROBLEM — U07.1 COVID-19: Status: RESOLVED | Noted: 2022-01-18 | Resolved: 2023-01-12

## 2023-01-12 PROBLEM — Z91.199 COMPLIANCE POOR: Status: RESOLVED | Noted: 2020-01-03 | Resolved: 2023-01-12

## 2023-01-12 PROBLEM — E66.9 OBESITY (BMI 35.0-39.9 WITHOUT COMORBIDITY): Status: RESOLVED | Noted: 2017-12-20 | Resolved: 2023-01-12

## 2023-01-12 LAB
BASOPHILS # BLD AUTO: 0.05 10*3/MM3 (ref 0–0.2)
BASOPHILS NFR BLD AUTO: 0.6 % (ref 0–1.5)
DEPRECATED RDW RBC AUTO: 39.5 FL (ref 37–54)
EOSINOPHIL # BLD AUTO: 0.21 10*3/MM3 (ref 0–0.4)
EOSINOPHIL NFR BLD AUTO: 2.6 % (ref 0.3–6.2)
ERYTHROCYTE [DISTWIDTH] IN BLOOD BY AUTOMATED COUNT: 12.5 % (ref 12.3–15.4)
HBA1C MFR BLD: 5.1 % (ref 4.8–5.6)
HCT VFR BLD AUTO: 44.5 % (ref 34–46.6)
HGB BLD-MCNC: 15.5 G/DL (ref 12–15.9)
IMM GRANULOCYTES # BLD AUTO: 0.02 10*3/MM3 (ref 0–0.05)
IMM GRANULOCYTES NFR BLD AUTO: 0.2 % (ref 0–0.5)
LYMPHOCYTES # BLD AUTO: 2.58 10*3/MM3 (ref 0.7–3.1)
LYMPHOCYTES NFR BLD AUTO: 31.7 % (ref 19.6–45.3)
MCH RBC QN AUTO: 30 PG (ref 26.6–33)
MCHC RBC AUTO-ENTMCNC: 34.8 G/DL (ref 31.5–35.7)
MCV RBC AUTO: 86.1 FL (ref 79–97)
MONOCYTES # BLD AUTO: 0.75 10*3/MM3 (ref 0.1–0.9)
MONOCYTES NFR BLD AUTO: 9.2 % (ref 5–12)
NEUTROPHILS NFR BLD AUTO: 4.54 10*3/MM3 (ref 1.7–7)
NEUTROPHILS NFR BLD AUTO: 55.7 % (ref 42.7–76)
NRBC BLD AUTO-RTO: 0.1 /100 WBC (ref 0–0.2)
PLATELET # BLD AUTO: 313 10*3/MM3 (ref 140–450)
PMV BLD AUTO: 9.7 FL (ref 6–12)
RBC # BLD AUTO: 5.17 10*6/MM3 (ref 3.77–5.28)
WBC NRBC COR # BLD: 8.15 10*3/MM3 (ref 3.4–10.8)

## 2023-01-12 PROCEDURE — 80061 LIPID PANEL: CPT

## 2023-01-12 PROCEDURE — 99214 OFFICE O/P EST MOD 30 MIN: CPT | Performed by: FAMILY MEDICINE

## 2023-01-12 PROCEDURE — 83036 HEMOGLOBIN GLYCOSYLATED A1C: CPT

## 2023-01-12 PROCEDURE — 82306 VITAMIN D 25 HYDROXY: CPT

## 2023-01-12 PROCEDURE — 80050 GENERAL HEALTH PANEL: CPT

## 2023-01-12 PROCEDURE — 82607 VITAMIN B-12: CPT

## 2023-01-12 RX ORDER — FEXOFENADINE HCL 180 MG/1
180 TABLET ORAL DAILY
Qty: 90 TABLET | Refills: 1 | Status: SHIPPED | OUTPATIENT
Start: 2023-01-12

## 2023-01-12 RX ORDER — FLUTICASONE PROPIONATE 50 MCG
2 SPRAY, SUSPENSION (ML) NASAL DAILY
Qty: 15.8 ML | Refills: 5 | Status: SHIPPED | OUTPATIENT
Start: 2023-01-12

## 2023-01-12 RX ORDER — DARIDOREXANT 50 MG/1
50 TABLET, FILM COATED ORAL NIGHTLY
Qty: 30 TABLET | Refills: 5 | Status: SHIPPED | OUTPATIENT
Start: 2023-01-12 | End: 2023-02-27

## 2023-01-12 RX ORDER — LISINOPRIL AND HYDROCHLOROTHIAZIDE 20; 12.5 MG/1; MG/1
1 TABLET ORAL DAILY
Qty: 90 TABLET | Refills: 1 | Status: SHIPPED | OUTPATIENT
Start: 2023-01-12

## 2023-01-12 NOTE — PROGRESS NOTES
Subjective     Chief Complaint  Hypertension (Est care and has a rash on right forearm)    Subjective          Natalie Kan is a 57 y.o. female who presents today to [facility] for initial evaluation .    HPI:   History of Present Illness     Patient presents for follow-up on chronic medical problems including hypertension, obstructive sleep apnea and migraines, seasonal allergies. . Patient denies adverse effects of medications, headache, dizziness, chest pain, palpitations, shortness of breath and cough. Patient complains of Rash'. Patient is here for monitoring of chronic issues due to need for monitoring of renal function, liver function, blood pressure effects of meds, adverse effects and high risk medication side effects    The following portions of the patient's history were reviewed and updated as appropriate: allergies, current medications, past family history, past medical history, past social history, past surgical history and problem list.    Objective     Objective     Allergy:   Allergies   Allergen Reactions   • Augmentin [Amoxicillin-Pot Clavulanate] GI Intolerance        Current Medications:   Current Outpatient Medications   Medication Sig Dispense Refill   • calcium carbonate (OS-TORREY) 600 MG tablet Take 600 mg by mouth Daily.     • estradiol (Yuvafem) 10 MCG tablet vaginal tablet Insert 1 tablet into the vagina 2 (Two) Times a Week. 8 tablet 11   • fexofenadine (ALLEGRA) 180 MG tablet Take 1 tablet by mouth Daily. 90 tablet 1   • fluticasone (FLONASE) 50 MCG/ACT nasal spray 2 sprays into the nostril(s) as directed by provider Daily. 15.8 mL 5   • lisinopril-hydrochlorothiazide (PRINZIDE,ZESTORETIC) 20-12.5 MG per tablet Take 1 tablet by mouth Daily. 90 tablet 1   • Multiple Vitamins-Minerals (MULTIVITAMIN ADULT PO) Take  by mouth.     • Valerian Root 450 MG capsule Take 1 capsule by mouth Every Night.     • Daridorexant HCl (Quviviq) 50 MG tablet Take  1 tablet by mouth Every Night. 30 tablet 5   • triamcinolone (KENALOG) 0.1 % ointment Apply 1 application topically to the appropriate area as directed 2 (Two) Times a Day. 30 g 1   • ubrogepant 100 MG tablet Take 1 tablet by mouth 1 (One) Time for 1 dose. May repeat in 2 hours if needed 16 tablet 5     No current facility-administered medications for this visit.       Past Medical History:  Past Medical History:   Diagnosis Date   • Abnormal Pap smear of cervix     ASCUS   • Contraception management     Nml PAP with HPV neg 2013   • Dysfunctional uterine bleeding    • History of prior pregnancies     A1   • Hypertension    • Malignant neoplasm (HCC)     SQUAMOUS    • Migraine    • Non-healing skin lesion    • SOB (shortness of breath)    • Uterine leiomyoma        Past Surgical History:  Past Surgical History:   Procedure Laterality Date   • APPENDECTOMY     •  SECTION     •  SECTION     • MOHS SURGERY      micrographic surgery face right side - due to squamous cell carcinoma   • WISDOM TOOTH EXTRACTION         Social History:  Social History     Socioeconomic History   • Marital status:    Tobacco Use   • Smoking status: Never     Passive exposure: Never   • Smokeless tobacco: Never   Vaping Use   • Vaping Use: Never used   Substance and Sexual Activity   • Alcohol use: No   • Drug use: No   • Sexual activity: Yes     Partners: Male     Birth control/protection: Post-menopausal       Family History:  Family History   Problem Relation Age of Onset   • Migraines Other    • Skin cancer Other    • Cancer Other    • Hypertension Other    • Migraines Mother    • Cancer Mother         Malignant Neoplasm of left eye/ Renal cell cancer   • Hypertension Sister    • Migraines Brother    • Cancer Father    • Breast cancer Neg Hx    • Ovarian cancer Neg Hx        Review of Systems:  Review of Systems   Constitutional: Negative for activity change, appetite change and fatigue.  "  Respiratory: Negative for cough, chest tightness, shortness of breath and wheezing.    Cardiovascular: Negative for chest pain, palpitations and leg swelling.   Gastrointestinal: Negative for abdominal pain, blood in stool, constipation and diarrhea.   Skin: Positive for rash.   Allergic/Immunologic: Negative for environmental allergies.   Neurological: Negative for dizziness, facial asymmetry, light-headedness and numbness.   Psychiatric/Behavioral: Positive for sleep disturbance. Negative for agitation, hallucinations, self-injury and suicidal ideas.       Vital Signs:   /88   Pulse 68   Temp 97.3 °F (36.3 °C) (Infrared)   Ht 151.1 cm (59.5\")   Wt 89.5 kg (197 lb 6.4 oz)   SpO2 97%   BMI 39.20 kg/m²      Physical Exam:  Physical Exam  Constitutional:       Appearance: She is normal weight.   HENT:      Head: Normocephalic.      Right Ear: Tympanic membrane normal.   Cardiovascular:      Rate and Rhythm: Normal rate and regular rhythm.      Pulses: Normal pulses.      Heart sounds: No murmur heard.  Pulmonary:      Effort: Pulmonary effort is normal. No respiratory distress.      Breath sounds: No wheezing.   Abdominal:      General: Abdomen is flat. Bowel sounds are normal.   Musculoskeletal:         General: No swelling or tenderness.      Cervical back: Normal range of motion.   Skin:     Capillary Refill: Capillary refill takes less than 2 seconds.      Findings: Rash present.   Neurological:      General: No focal deficit present.      Mental Status: She is alert. Mental status is at baseline.   Psychiatric:         Mood and Affect: Mood normal.         Behavior: Behavior normal.         Thought Content: Thought content normal.         Judgment: Judgment normal.             PHQ-9 Score  PHQ-9 Total Score: 0     Lab Review  No visits with results within 3 Month(s) from this visit.   Latest known visit with results is:   Admission on 09/24/2022, Discharged on 09/24/2022   Component Date Value Ref " Range Status   • SARS-CoV-2 JB 09/24/2022 Not Detected  Not Detected Final        Radiology Results        Assessment / Plan         Assessment and Plan   Diagnoses and all orders for this visit:    1. Migraine without status migrainosus, not intractable, unspecified migraine type (Primary)  -     ubrogepant 100 MG tablet; Take 1 tablet by mouth 1 (One) Time for 1 dose. May repeat in 2 hours if needed  Dispense: 16 tablet; Refill: 5    2. Essential hypertension  Assessment & Plan:  Hypertension is improving with treatment.  Continue current treatment regimen.  Dietary sodium restriction.  Weight loss.  Regular aerobic exercise.  Continue current medications.  Blood pressure will be reassessed in 3 months.    Orders:  -     lisinopril-hydrochlorothiazide (PRINZIDE,ZESTORETIC) 20-12.5 MG per tablet; Take 1 tablet by mouth Daily.  Dispense: 90 tablet; Refill: 1  -     CBC & Differential; Future  -     Comprehensive Metabolic Panel; Future  -     Hemoglobin A1c; Future  -     Lipid Panel; Future  -     TSH Rfx On Abnormal To Free T4; Future    3. Primary insomnia  -     Daridorexant HCl (Quviviq) 50 MG tablet; Take 1 tablet by mouth Every Night.  Dispense: 30 tablet; Refill: 5    4. Nonmelanoma skin cancer  Assessment & Plan:  Encouraged follow up with Dermatology     Orders:  -     Ambulatory Referral to Dermatology    5. Seasonal allergies  -     fexofenadine (ALLEGRA) 180 MG tablet; Take 1 tablet by mouth Daily.  Dispense: 90 tablet; Refill: 1  -     fluticasone (FLONASE) 50 MCG/ACT nasal spray; 2 sprays into the nostril(s) as directed by provider Daily.  Dispense: 15.8 mL; Refill: 5    6. Screening for colon cancer  -     Cologuard - Stool, Per Rectum; Future    7. Post-menopausal  -     DEXA Bone Density Axial; Future    8. Other long term (current) drug therapy  -     Hemoglobin A1c; Future  -     Lipid Panel; Future    9. Vitamin D deficiency  -     Vitamin B12; Future  -     Vitamin D,25-Hydroxy; Future    10.  Rash  -     triamcinolone (KENALOG) 0.1 % ointment; Apply 1 application topically to the appropriate area as directed 2 (Two) Times a Day.  Dispense: 30 g; Refill: 1      Discussed possible differential diagnoses, testing, treatment, recommended non-pharmacological interventions, risks, warning signs to monitor for that would indicate need for follow-up in clinic or ER. If no improvement with these regimens or you have new or worsening symptoms follow-up. Patient verbalizes understanding and agreement with plan of care. Denies further needs or concerns.     Patient was given instructions and counseling regarding her condition and for health maintenance advised.    Class 2 Severe Obesity (BMI >=35 and <=39.9). Obesity-related health conditions include the following: obstructive sleep apnea, hypertension and dyslipidemias. Obesity is worsening. BMI is is above average; BMI management plan is completed. We discussed low calorie, low carb based diet program, portion control and increasing exercise.      Health Maintenance  Health Maintenance:   Health Maintenance Due   Topic Date Due   • COLORECTAL CANCER SCREENING  Never done   • DXA SCAN  2022        Meds ordered during this visit  New Medications Ordered This Visit   Medications   • lisinopril-hydrochlorothiazide (PRINZIDE,ZESTORETIC) 20-12.5 MG per tablet     Sig: Take 1 tablet by mouth Daily.     Dispense:  90 tablet     Refill:  1   • ubrogepant 100 MG tablet     Sig: Take 1 tablet by mouth 1 (One) Time for 1 dose. May repeat in 2 hours if needed     Dispense:  16 tablet     Refill:  5   • fexofenadine (ALLEGRA) 180 MG tablet     Sig: Take 1 tablet by mouth Daily.     Dispense:  90 tablet     Refill:  1   • fluticasone (FLONASE) 50 MCG/ACT nasal spray     Si sprays into the nostril(s) as directed by provider Daily.     Dispense:  15.8 mL     Refill:  5   • Daridorexant HCl (Quviviq) 50 MG tablet     Sig: Take 1 tablet by mouth Every Night.     Dispense:   30 tablet     Refill:  5   • triamcinolone (KENALOG) 0.1 % ointment     Sig: Apply 1 application topically to the appropriate area as directed 2 (Two) Times a Day.     Dispense:  30 g     Refill:  1       Meds stopped during this visit:  Medications Discontinued During This Encounter   Medication Reason   • SUMAtriptan (IMITREX) 100 MG tablet    • fexofenadine (ALLEGRA) 180 MG tablet Reorder   • fluticasone (FLONASE) 50 MCG/ACT nasal spray Reorder   • lisinopril-hydrochlorothiazide (PRINZIDE,ZESTORETIC) 20-12.5 MG per tablet Reorder        Visit Diagnoses    ICD-10-CM ICD-9-CM   1. Migraine without status migrainosus, not intractable, unspecified migraine type  G43.909 346.90   2. Essential hypertension  I10 401.9   3. Primary insomnia  F51.01 307.42   4. Nonmelanoma skin cancer  C44.90 173.90   5. Seasonal allergies  J30.2 477.9   6. Screening for colon cancer  Z12.11 V76.51   7. Post-menopausal  Z78.0 V49.81   8. Other long term (current) drug therapy  Z79.899 V58.69   9. Vitamin D deficiency  E55.9 268.9   10. Rash  R21 782.1       Patient was given instructions and counseling regarding her condition or for health maintenance advice. Please see specific information pulled into the AVS if appropriate.     Follow Up   Return in about 3 months (around 4/12/2023) for Recheck.    This document has been electronically signed by Beth Dill DO   January 12, 2023 15:40 EST    Dictated Utilizing Dragon Dictation: Part of this note may be an electronic transcription/translation of spoken language to printed text using the Dragon Dictation System.    Beth Dill D.O.  [facility]

## 2023-01-13 ENCOUNTER — PRIOR AUTHORIZATION (OUTPATIENT)
Dept: FAMILY MEDICINE CLINIC | Facility: CLINIC | Age: 58
End: 2023-01-13
Payer: COMMERCIAL

## 2023-01-13 LAB
25(OH)D3 SERPL-MCNC: 56 NG/ML (ref 30–100)
ALBUMIN SERPL-MCNC: 4.8 G/DL (ref 3.5–5.2)
ALBUMIN/GLOB SERPL: 1.9 G/DL
ALP SERPL-CCNC: 89 U/L (ref 39–117)
ALT SERPL W P-5'-P-CCNC: 40 U/L (ref 1–33)
ANION GAP SERPL CALCULATED.3IONS-SCNC: 11.1 MMOL/L (ref 5–15)
AST SERPL-CCNC: 36 U/L (ref 1–32)
BILIRUB SERPL-MCNC: 0.7 MG/DL (ref 0–1.2)
BUN SERPL-MCNC: 8 MG/DL (ref 6–20)
BUN/CREAT SERPL: 10.5 (ref 7–25)
CALCIUM SPEC-SCNC: 10.1 MG/DL (ref 8.6–10.5)
CHLORIDE SERPL-SCNC: 103 MMOL/L (ref 98–107)
CHOLEST SERPL-MCNC: 159 MG/DL (ref 0–200)
CO2 SERPL-SCNC: 26.9 MMOL/L (ref 22–29)
CREAT SERPL-MCNC: 0.76 MG/DL (ref 0.57–1)
EGFRCR SERPLBLD CKD-EPI 2021: 91.5 ML/MIN/1.73
GLOBULIN UR ELPH-MCNC: 2.5 GM/DL
GLUCOSE SERPL-MCNC: 85 MG/DL (ref 65–99)
HDLC SERPL-MCNC: 62 MG/DL (ref 40–60)
LDLC SERPL CALC-MCNC: 86 MG/DL (ref 0–100)
LDLC/HDLC SERPL: 1.38 {RATIO}
POTASSIUM SERPL-SCNC: 3.8 MMOL/L (ref 3.5–5.2)
PROT SERPL-MCNC: 7.3 G/DL (ref 6–8.5)
SODIUM SERPL-SCNC: 141 MMOL/L (ref 136–145)
TRIGL SERPL-MCNC: 56 MG/DL (ref 0–150)
TSH SERPL DL<=0.05 MIU/L-ACNC: 1.61 UIU/ML (ref 0.27–4.2)
VIT B12 BLD-MCNC: >2000 PG/ML (ref 211–946)
VLDLC SERPL-MCNC: 11 MG/DL (ref 5–40)

## 2023-01-13 NOTE — TELEPHONE ENCOUNTER
PA started 01/13/2023      NISH MOODY   Key: [id number] -   Rx #: [id number]  Ubrelvy 100MG tablets

## 2023-01-18 ENCOUNTER — PRIOR AUTHORIZATION (OUTPATIENT)
Dept: FAMILY MEDICINE CLINIC | Facility: CLINIC | Age: 58
End: 2023-01-18
Payer: COMMERCIAL

## 2023-01-18 NOTE — TELEPHONE ENCOUNTER
1/24/23  NISH MOODY   Key: [id number] -   PA Case ID: [id number] -   Rx #: [id number]  Sent to [employer]  Drug  Quviviq 50MG tablets      NISHCALVIN MOODY   Key: [id number] -   PA Case ID: [id number] - Rx #: [id number]  Outcome  Denied on January 19  Your PA request has been denied. Additional information will be provided in the denial communication. (Message 1140)  Drug  Quviviq 50MG tablets                        1/18/23    PA sent to plan   TRY GENERICS FIRST       NISHCALVIN MOODY   Key: [id number] -   PA Case ID: [id number] -   Rx #: [id number]  Sent to [employer]  Drug  Quviviq 50MG tablets  Original Claim Info  70,MR TRY GENERICS FIRST OR CALL [id number].NON-FORMULARY DRUG, CONTACT PRESCRIBER

## 2023-01-23 RX ORDER — RAMELTEON 8 MG/1
8 TABLET ORAL NIGHTLY
Qty: 30 TABLET | Refills: 0 | Status: SHIPPED | OUTPATIENT
Start: 2023-01-23 | End: 2023-02-27

## 2023-01-24 NOTE — TELEPHONE ENCOUNTER
1/24/23      NISH MOODY   Key: [id number] -   PA Case ID: [id number] -   Rx #: [id number]  Outcome  Appro[place] January 17  Your PA request has been approved. Additional information will be provided in the approval communication. (Message 1147)  Drug

## 2023-01-26 NOTE — TELEPHONE ENCOUNTER
PA denied 01/19/2023      NISH MOODY   Key: [id number] -   PA Case ID: [id number] -   Rx #: [id number]  Quviviq 50MG tablets

## 2023-01-27 ENCOUNTER — PRIOR AUTHORIZATION (OUTPATIENT)
Dept: FAMILY MEDICINE CLINIC | Facility: CLINIC | Age: 58
End: 2023-01-27
Payer: COMMERCIAL

## 2023-02-03 ENCOUNTER — PRIOR AUTHORIZATION (OUTPATIENT)
Dept: FAMILY MEDICINE CLINIC | Facility: CLINIC | Age: 58
End: 2023-02-03
Payer: COMMERCIAL

## 2023-02-27 ENCOUNTER — OFFICE VISIT (OUTPATIENT)
Dept: OBSTETRICS AND GYNECOLOGY | Facility: CLINIC | Age: 58
End: 2023-02-27
Payer: COMMERCIAL

## 2023-02-27 VITALS
SYSTOLIC BLOOD PRESSURE: 120 MMHG | HEIGHT: 60 IN | DIASTOLIC BLOOD PRESSURE: 76 MMHG | WEIGHT: 196 LBS | BODY MASS INDEX: 38.48 KG/M2

## 2023-02-27 DIAGNOSIS — R87.610 ASCUS OF CERVIX WITH NEGATIVE HIGH RISK HPV: Primary | ICD-10-CM

## 2023-02-27 DIAGNOSIS — Z01.419 ENCOUNTER FOR GYNECOLOGICAL EXAMINATION WITHOUT ABNORMAL FINDING: ICD-10-CM

## 2023-02-27 PROCEDURE — 99396 PREV VISIT EST AGE 40-64: CPT | Performed by: NURSE PRACTITIONER

## 2023-02-27 RX ORDER — UBROGEPANT 100 MG/1
TABLET ORAL
COMMUNITY
Start: 2023-01-12

## 2023-02-27 NOTE — PROGRESS NOTES
Chief Complaint  Natalie Kan is a 58 y.o.  female presenting for Annual Exam (Assess vaginal atrophy.  /Discuss need for repeat pap today.) and Med Refill (Vagifem 10 mcg (#8 with refills).  [facility] pharmacy.)    History of Present Illness  Natalie is a very pleasant 59yo woman, , here for annual gyn exam.  Her last pap (one year ago) was ASCUS, negative for HR HPV.  We discussed the current guidelines.  (In the absence of HR HPV, this pap can be considered wnl, and we can wait 3 years for repeat pap.)  She is not comfortable with that plan of care.  We both feel the atrophy is the etio of the problem, and she wants to know if pap has gone back to normal.  Requests pap to be repeated this year.  ABN was signed.  Otherwise, ROS negative.      The following portions of the patient's history were reviewed and updated as appropriate: allergies, current medications, past family history, past medical history, past social history, past surgical history and problem list.    Allergies   Allergen Reactions   • Augmentin [Amoxicillin-Pot Clavulanate] GI Intolerance         Current Outpatient Medications:   •  calcium carbonate (OS-TORREY) 600 MG tablet, Take 600 mg by mouth Daily., Disp: , Rfl:   •  Daridorexant HCl (Quviviq) 50 MG tablet, Take 1 tablet by mouth Every Night., Disp: 30 tablet, Rfl: 5  •  estradiol (Yuvafem) 10 MCG tablet vaginal tablet, Insert 1 tablet into the vagina 2 (Two) Times a Week., Disp: 8 tablet, Rfl: 11  •  fexofenadine (ALLEGRA) 180 MG tablet, Take 1 tablet by mouth Daily., Disp: 90 tablet, Rfl: 1  •  fluticasone (FLONASE) 50 MCG/ACT nasal spray, 2 sprays into the nostril(s) as directed by provider Daily., Disp: 15.8 mL, Rfl: 5  •  lisinopril-hydrochlorothiazide (PRINZIDE,ZESTORETIC) 20-12.5 MG per tablet, Take 1 tablet by mouth Daily., Disp: 90 tablet, Rfl: 1  •  Multiple Vitamins-Minerals (MULTIVITAMIN ADULT PO), Take  by mouth., Disp: , Rfl:   •  ramelteon (ROZEREM) 8 MG tablet,  "Take 1 tablet by mouth Every Night., Disp: 30 tablet, Rfl: 0  •  triamcinolone (KENALOG) 0.1 % ointment, Apply 1 application topically to the appropriate area as directed 2 (Two) Times a Day., Disp: 30 g, Rfl: 1  •  Valerian Root 450 MG capsule, Take 1 capsule by mouth Every Night., Disp: , Rfl:     Past Medical History:   Diagnosis Date   • Abnormal Pap smear of cervix     ASCUS   • Contraception management     Nml PAP with HPV neg 2013   • Dysfunctional uterine bleeding    • History of prior pregnancies     A1   • Hypertension    • Malignant neoplasm (HCC)     SQUAMOUS    • Migraine    • Non-healing skin lesion    • SOB (shortness of breath)    • Uterine leiomyoma         Past Surgical History:   Procedure Laterality Date   • APPENDECTOMY     •  SECTION     •  SECTION     • MOHS SURGERY      micrographic surgery face right side - due to squamous cell carcinoma   • WISDOM TOOTH EXTRACTION         Objective  Ht 151.1 cm (59.5\")   Wt 88.9 kg (196 lb)   Breastfeeding No   BMI 38.92 kg/m²     Physical Exam  Vitals and nursing note reviewed. Exam conducted with a chaperone present.   Constitutional:       General: She is not in acute distress.     Appearance: Normal appearance. She is not ill-appearing.   HENT:      Head: Normocephalic.   Neck:      Thyroid: No thyroid mass or thyromegaly.   Cardiovascular:      Rate and Rhythm: Normal rate and regular rhythm.      Heart sounds: Normal heart sounds. No murmur heard.  Pulmonary:      Effort: Pulmonary effort is normal. No respiratory distress.      Breath sounds: Normal breath sounds.   Chest:   Breasts:     Right: No inverted nipple, mass or nipple discharge.      Left: No inverted nipple, mass or nipple discharge.   Abdominal:      Palpations: Abdomen is soft. There is no mass.      Tenderness: There is no abdominal tenderness.   Genitourinary:     General: Normal vulva.      Labia:         Right: No rash, tenderness or lesion.    "      Left: No rash, tenderness or lesion.       Vagina: Normal. No vaginal discharge or erythema.      Cervix: No discharge, lesion or erythema.      Uterus: Not enlarged and not tender.       Adnexa:         Right: No mass or tenderness.          Left: No mass or tenderness.        Comments: Now appears to be pink & well estrogenized.    Pap smear taken.  Normal bimanual exam.  Anus appears wnl.  No rectal exam performed.  Lymphadenopathy:      Upper Body:      Right upper body: No supraclavicular or axillary adenopathy.      Left upper body: No supraclavicular or axillary adenopathy.   Skin:     General: Skin is warm and dry.   Neurological:      Mental Status: She is alert and oriented to person, place, and time.   Psychiatric:         Mood and Affect: Mood normal.         Behavior: Behavior normal.         Assessment/Plan   Diagnoses and all orders for this visit:    1. ASCUS of cervix with negative high risk HPV (Primary)  -     LIQUID-BASED PAP SMEAR, [facility] ([facility])    2. Encounter for gynecological examination without abnormal finding    Continue the current vaginal (local ERT) 1-2x/ week.    Procedures    40 to 64: Counseling/Anticipatory Guidance Discussed: screenings and self-breast exam    Return in about 1 year (around 2/27/2024) for Annual physical.    Susan Welch, APRN  02/27/2023

## 2023-02-28 ENCOUNTER — OFFICE VISIT (OUTPATIENT)
Dept: FAMILY MEDICINE CLINIC | Facility: CLINIC | Age: 58
End: 2023-02-28
Payer: COMMERCIAL

## 2023-02-28 VITALS
WEIGHT: 195.4 LBS | HEART RATE: 90 BPM | TEMPERATURE: 98.7 F | HEIGHT: 59 IN | BODY MASS INDEX: 39.39 KG/M2 | SYSTOLIC BLOOD PRESSURE: 130 MMHG | OXYGEN SATURATION: 96 % | DIASTOLIC BLOOD PRESSURE: 76 MMHG

## 2023-02-28 DIAGNOSIS — J02.9 SORE THROAT: ICD-10-CM

## 2023-02-28 DIAGNOSIS — H66.002 NON-RECURRENT ACUTE SUPPURATIVE OTITIS MEDIA OF LEFT EAR WITHOUT SPONTANEOUS RUPTURE OF TYMPANIC MEMBRANE: Primary | ICD-10-CM

## 2023-02-28 LAB
EXPIRATION DATE: NORMAL
INTERNAL CONTROL: NORMAL
Lab: NORMAL
S PYO AG THROAT QL: NEGATIVE

## 2023-02-28 PROCEDURE — 99213 OFFICE O/P EST LOW 20 MIN: CPT | Performed by: FAMILY MEDICINE

## 2023-02-28 PROCEDURE — 87880 STREP A ASSAY W/OPTIC: CPT | Performed by: FAMILY MEDICINE

## 2023-02-28 RX ORDER — CEFDINIR 300 MG/1
300 CAPSULE ORAL 2 TIMES DAILY
Qty: 14 CAPSULE | Refills: 0 | Status: SHIPPED | OUTPATIENT
Start: 2023-02-28 | End: 2023-03-07

## 2023-02-28 NOTE — ASSESSMENT & PLAN NOTE
Likely initially allergies with secondary bacterial infection  Due to signs and symptoms and duration of illness will start antibiotics.  Rx for Cefdinir x 7 days sent to pharmacy  Discussed use of saline nasal rinses and medications as prescribed  Continue allergy medications, Tylenol/ibuprofen as needed.  If symptoms do not improve patient to return to clinic for reevaluation

## 2023-02-28 NOTE — PROGRESS NOTES
"Chief Complaint  Sore Throat (Left ear feels tender, congestion, red, and burns)    Subjective        Natalie Kan presents to [facility]  Earache   There is pain in the left ear. This is a recurrent problem. The current episode started 1 to 4 weeks ago. The problem has been waxing and waning. There has been no fever. Associated symptoms include rhinorrhea and a sore throat. Pertinent negatives include no abdominal pain, diarrhea or ear discharge. She has tried acetaminophen for the symptoms. The treatment provided mild relief.       Objective   Vital Signs:  /76   Pulse 90   Temp 98.7 °F (37.1 °C) (Infrared)   Ht 151.1 cm (59.49\")   Wt 88.6 kg (195 lb 6.4 oz)   SpO2 96%   BMI 38.82 kg/m²   Estimated body mass index is 38.82 kg/m² as calculated from the following:    Height as of this encounter: 151.1 cm (59.49\").    Weight as of this encounter: 88.6 kg (195 lb 6.4 oz).       Class 2 Severe Obesity (BMI >=35 and <=39.9). Obesity-related health conditions include the following: obstructive sleep apnea and hypertension. Obesity is unchanged. BMI is is above average; BMI management plan is completed. We discussed low calorie, low carb based diet program and portion control. (Shared in AVS)       Physical Exam  Constitutional:       Appearance: Normal appearance.   HENT:      Right Ear: A middle ear effusion is present.      Left Ear: A middle ear effusion is present. Tympanic membrane is erythematous and bulging. Tympanic membrane is not perforated.      Nose: Rhinorrhea present.      Mouth/Throat:      Pharynx: Posterior oropharyngeal erythema present.      Comments: tonsillith   Cardiovascular:      Rate and Rhythm: Normal rate and regular rhythm.      Pulses: Normal pulses.   Pulmonary:      Effort: Pulmonary effort is normal.      Breath sounds: Normal breath sounds.   Skin:     Capillary Refill: Capillary refill takes less than 2 seconds.   Neurological:      " General: No focal deficit present.      Mental Status: She is alert.   Psychiatric:         Mood and Affect: Mood normal.         Behavior: Behavior normal.         Thought Content: Thought content normal.         Judgment: Judgment normal.        Result Review :               Assessment and Plan   Diagnoses and all orders for this visit:    1. Non-recurrent acute suppurative otitis media of left ear without spontaneous rupture of tympanic membrane (Primary)  Assessment & Plan:  Likely initially allergies with secondary bacterial infection  Due to signs and symptoms and duration of illness will start antibiotics.  Rx for Cefdinir x 7 days sent to pharmacy  Discussed use of saline nasal rinses and medications as prescribed  Continue allergy medications, Tylenol/ibuprofen as needed.  If symptoms do not improve patient to return to clinic for reevaluation      Orders:  -     cefdinir (OMNICEF) 300 MG capsule; Take 1 capsule by mouth 2 (Two) Times a Day for 7 days.  Dispense: 14 capsule; Refill: 0    2. Sore throat  -     POC Rapid Strep A         Follow Up   Return if symptoms worsen or fail to improve.  Patient was given instructions and counseling regarding her condition or for health maintenance advice. Please see specific information pulled into the AVS if appropriate.       This document has been electronically signed by Beth Dill DO   February 28, 2023 11:44 EST    Dictated Utilizing Dragon Dictation: Part of this note may be an electronic transcription/translation of spoken language to printed text using the Dragon Dictation System.    Beth Dill D.O.  [facility] [place]

## 2023-03-01 LAB — REF LAB TEST METHOD: NORMAL

## 2023-03-23 NOTE — TELEPHONE ENCOUNTER
PA Approved 02/07/2023    Veronica   
PA started 02/03/2023      NISH MOODY   Key: [id number] -   PA Case ID: [id number] -   Rx #: [id number]  Ramelteon 8MG tablets  
Statement Selected
- - -

## 2023-04-10 DIAGNOSIS — I10 ESSENTIAL HYPERTENSION: ICD-10-CM

## 2023-04-10 RX ORDER — LISINOPRIL AND HYDROCHLOROTHIAZIDE 20; 12.5 MG/1; MG/1
1 TABLET ORAL DAILY
Qty: 90 TABLET | OUTPATIENT
Start: 2023-04-10

## 2023-04-10 RX ORDER — LISINOPRIL AND HYDROCHLOROTHIAZIDE 20; 12.5 MG/1; MG/1
1 TABLET ORAL DAILY
Qty: 90 TABLET | Refills: 1 | OUTPATIENT
Start: 2023-04-10

## 2023-04-10 RX ORDER — LISINOPRIL AND HYDROCHLOROTHIAZIDE 20; 12.5 MG/1; MG/1
1 TABLET ORAL DAILY
Qty: 30 TABLET | Refills: 0 | Status: SHIPPED | OUTPATIENT
Start: 2023-04-10

## 2023-04-11 ENCOUNTER — HOSPITAL ENCOUNTER (OUTPATIENT)
Dept: BONE DENSITY | Facility: HOSPITAL | Age: 58
Discharge: HOME | End: 2023-04-11
Admitting: FAMILY MEDICINE
Payer: COMMERCIAL

## 2023-04-11 ENCOUNTER — OFFICE VISIT (OUTPATIENT)
Dept: FAMILY MEDICINE CLINIC | Facility: CLINIC | Age: 58
End: 2023-04-11
Payer: COMMERCIAL

## 2023-04-11 VITALS
OXYGEN SATURATION: 98 % | TEMPERATURE: 97.7 F | RESPIRATION RATE: 24 BRPM | HEART RATE: 80 BPM | SYSTOLIC BLOOD PRESSURE: 126 MMHG | BODY MASS INDEX: 39.06 KG/M2 | DIASTOLIC BLOOD PRESSURE: 84 MMHG | WEIGHT: 196.6 LBS

## 2023-04-11 DIAGNOSIS — J30.2 SEASONAL ALLERGIES: ICD-10-CM

## 2023-04-11 DIAGNOSIS — I10 ESSENTIAL HYPERTENSION: ICD-10-CM

## 2023-04-11 DIAGNOSIS — F51.01 PRIMARY INSOMNIA: Primary | ICD-10-CM

## 2023-04-11 DIAGNOSIS — Z78.0 POST-MENOPAUSAL: ICD-10-CM

## 2023-04-11 DIAGNOSIS — R21 RASH AND NONSPECIFIC SKIN ERUPTION: ICD-10-CM

## 2023-04-11 DIAGNOSIS — E66.01 CLASS 2 SEVERE OBESITY WITH SERIOUS COMORBIDITY AND BODY MASS INDEX (BMI) OF 39.0 TO 39.9 IN ADULT, UNSPECIFIED OBESITY TYPE: ICD-10-CM

## 2023-04-11 PROBLEM — H66.002 NON-RECURRENT ACUTE SUPPURATIVE OTITIS MEDIA OF LEFT EAR WITHOUT SPONTANEOUS RUPTURE OF TYMPANIC MEMBRANE: Status: RESOLVED | Noted: 2023-02-28 | Resolved: 2023-04-11

## 2023-04-11 PROCEDURE — 77080 DXA BONE DENSITY AXIAL: CPT

## 2023-04-11 RX ORDER — METHYLPREDNISOLONE 4 MG/1
TABLET ORAL
Qty: 21 TABLET | Refills: 0 | Status: SHIPPED | OUTPATIENT
Start: 2023-04-11

## 2023-04-11 RX ORDER — SEMAGLUTIDE 0.25 MG/.5ML
0.25 INJECTION, SOLUTION SUBCUTANEOUS WEEKLY
Qty: 2 ML | Refills: 0 | Status: SHIPPED | OUTPATIENT
Start: 2023-04-11 | End: 2023-05-09

## 2023-04-11 RX ORDER — FLUTICASONE PROPIONATE 50 MCG
2 SPRAY, SUSPENSION (ML) NASAL DAILY
Qty: 15.8 ML | Refills: 5 | Status: SHIPPED | OUTPATIENT
Start: 2023-04-11

## 2023-04-11 RX ORDER — FEXOFENADINE HCL 180 MG/1
180 TABLET ORAL DAILY
Qty: 90 TABLET | Refills: 1 | Status: SHIPPED | OUTPATIENT
Start: 2023-04-11

## 2023-04-11 RX ORDER — RAMELTEON 8 MG/1
8 TABLET ORAL NIGHTLY
Qty: 30 TABLET | Refills: 0 | Status: SHIPPED | OUTPATIENT
Start: 2023-04-11

## 2023-04-11 NOTE — PROGRESS NOTES
Answers for HPI/ROS submitted by the patient on 4/10/2023  What is the primary reason for your visit?: Rash  Chronicity: recurrent  Onset: more than 1 month ago  Progression since onset: gradually worsening  Affected locations: left arm, right arm  Characteristics: redness, itchiness  Exposed to: nothing  anorexia: No  congestion: No  cough: No  diarrhea: No  eye pain: No  facial edema: No  fatigue: No  fever: No  joint pain: No  nail changes: No  rhinorrhea: No  shortness of breath: No  sore throat: No  vomiting: No          Subjective     Chief Complaint  Rash    Subjective          Natalie Kan is a 58 y.o. female who presents today to [facility] for initial evaluation .    HPI:   Rash  This is a recurrent problem. The current episode started more than 1 month ago. The problem has been gradually worsening since onset. The affected locations include the left arm and right arm. The rash is characterized by redness and itchiness. She was exposed to nothing. Pertinent negatives include no anorexia, congestion, cough, diarrhea, eye pain, facial edema, fatigue, fever, joint pain, nail changes, rhinorrhea, shortness of breath, sore throat or vomiting.        Patient presents for follow-up on chronic medical problems including hypertension, obstructive sleep apnea and migraines, seasonal allergies, insomnia. Patient denies adverse effects of medications, headache, dizziness, chest pain, palpitations, shortness of breath and cough. Patient complains of Rash'. Patient is here for monitoring of chronic issues due to need for monitoring of renal function, liver function, blood pressure effects of meds, adverse effects and high risk medication side effects    The following portions of the patient's history were reviewed and updated as appropriate: allergies, current medications, past family history, past medical history, past social history, past surgical history and problem  list.    Objective     Objective     Allergy:   Allergies   Allergen Reactions   • Augmentin [Amoxicillin-Pot Clavulanate] GI Intolerance        Current Medications:   Current Outpatient Medications   Medication Sig Dispense Refill   • calcium carbonate (OS-TORREY) 600 MG tablet Take 1 tablet by mouth Daily.     • estradiol (Yuvafem) 10 MCG tablet vaginal tablet Insert 1 tablet into the vagina 2 (Two) Times a Week. 8 tablet 11   • fexofenadine (ALLEGRA) 180 MG tablet Take 1 tablet by mouth Daily. 90 tablet 1   • fluticasone (FLONASE) 50 MCG/ACT nasal spray 2 sprays into the nostril(s) as directed by provider Daily. 15.8 mL 5   • lisinopril-hydrochlorothiazide (PRINZIDE,ZESTORETIC) 20-12.5 MG per tablet TAKE 1 TABLET BY MOUTH DAILY 30 tablet 0   • Multiple Vitamins-Minerals (MULTIVITAMIN ADULT PO) Take  by mouth.     • triamcinolone (KENALOG) 0.1 % ointment Apply 1 application topically to the appropriate area as directed 2 (Two) Times a Day. 30 g 1   • Ubrelvy 100 MG tablet TAKE 1 TABLET BY MOUTH 1 TIME FOR 1 DOSE. MAY REPEAT IN 2 HOURS AS NEEDED     • Valerian Root 450 MG capsule Take 1 capsule by mouth Every Night.     • methylPREDNISolone (MEDROL) 4 MG dose pack Take as directed on package instructions. 21 tablet 0   • ramelteon (ROZEREM) 8 MG tablet Take 1 tablet by mouth Every Night. 30 tablet 0   • Semaglutide-Weight Management (Wegovy) 0.25 MG/0.5ML solution auto-injector Inject 0.25 mg under the skin into the appropriate area as directed 1 (One) Time Per Week for 28 days. 2 mL 0     No current facility-administered medications for this visit.       Past Medical History:  Past Medical History:   Diagnosis Date   • Abnormal Pap smear of cervix     ASCUS   • Contraception management     Nml PAP with HPV neg 2013   • Dysfunctional uterine bleeding    • History of prior pregnancies     A1   • Hypertension    • Malignant neoplasm     SQUAMOUS    • Migraine    • Non-healing skin lesion    • SOB (shortness of  breath)    • Uterine leiomyoma        Past Surgical History:  Past Surgical History:   Procedure Laterality Date   • APPENDECTOMY     •  SECTION     •  SECTION     • MOHS SURGERY      micrographic surgery face right side - due to squamous cell carcinoma   • WISDOM TOOTH EXTRACTION         Social History:  Social History     Socioeconomic History   • Marital status:    Tobacco Use   • Smoking status: Never     Passive exposure: Never   • Smokeless tobacco: Never   Vaping Use   • Vaping Use: Never used   Substance and Sexual Activity   • Alcohol use: No   • Drug use: No   • Sexual activity: Yes     Partners: Male     Birth control/protection: Post-menopausal       Family History:  Family History   Problem Relation Age of Onset   • Migraines Other    • Skin cancer Other    • Cancer Other    • Hypertension Other    • Migraines Mother    • Cancer Mother         Malignant Neoplasm of left eye/ Renal cell cancer   • Hypertension Sister    • Migraines Brother    • Cancer Father    • Breast cancer Neg Hx    • Ovarian cancer Neg Hx        Review of Systems:  Review of Systems   Constitutional: Negative for activity change, appetite change, fatigue and fever.   HENT: Negative for congestion, rhinorrhea and sore throat.    Eyes: Negative for pain.   Respiratory: Negative for cough, chest tightness, shortness of breath and wheezing.    Cardiovascular: Negative for chest pain, palpitations and leg swelling.   Gastrointestinal: Negative for abdominal pain, anorexia, blood in stool, constipation, diarrhea and vomiting.   Musculoskeletal: Negative for joint pain.   Skin: Positive for rash. Negative for nail changes.   Allergic/Immunologic: Negative for environmental allergies.   Neurological: Negative for dizziness, facial asymmetry, light-headedness and numbness.   Psychiatric/Behavioral: Positive for sleep disturbance. Negative for agitation, hallucinations, self-injury and suicidal ideas.        Vital Signs:   /84   Pulse 80   Temp 97.7 °F (36.5 °C)   Resp 24   Wt 89.2 kg (196 lb 9.6 oz)   SpO2 98%   BMI 39.06 kg/m²      Physical Exam:  Physical Exam  Constitutional:       Appearance: She is normal weight.   HENT:      Head: Normocephalic.      Right Ear: Tympanic membrane normal.   Cardiovascular:      Rate and Rhythm: Normal rate and regular rhythm.      Pulses: Normal pulses.      Heart sounds: No murmur heard.  Pulmonary:      Effort: Pulmonary effort is normal. No respiratory distress.      Breath sounds: No wheezing.   Abdominal:      General: Abdomen is flat. Bowel sounds are normal.   Musculoskeletal:         General: No swelling or tenderness.      Cervical back: Normal range of motion.   Skin:     Capillary Refill: Capillary refill takes less than 2 seconds.      Findings: Rash present.   Neurological:      General: No focal deficit present.      Mental Status: She is alert. Mental status is at baseline.   Psychiatric:         Mood and Affect: Mood normal.         Behavior: Behavior normal.         Thought Content: Thought content normal.         Judgment: Judgment normal.             PHQ-9 Score  PHQ-9 Total Score:       Lab Review  Office Visit on 02/28/2023   Component Date Value Ref Range Status   • Rapid Strep A Screen 02/28/2023 Negative  Negative, VALID, INVALID, Not Performed Final   • Internal Control 02/28/2023 Passed  Passed Final   • Lot Number 02/28/2023 [id number]   Final   • Expiration Date 02/28/2023 9/12/24   Final   Office Visit on 02/27/2023   Component Date Value Ref Range Status   • Reference Lab Report 02/27/2023    Final                    Value:[facility]  [address] [place] [address]  Phone: [phone] or [phone]  Fax: [phone]  Jason Duran M.D., Medical Director    PATIENT NAME                                     LABORATORY NO.  NISH ROSARIO CARLOS EDUARDO.                             [id number]  [id number]                                  AGE                    SEX   SSN              CLIENT REF #  [facility]             58        1965      F     [id number]      [id number]    [address]           REQUESTING M.D.           ATTENDING [place] [address]                        TREASURE ABEBE  DATE COLLECTED            DATE RECEIVED          DATE REPORTED  2023    ThinPrep Pap with [facility]ty[facility] Imaging    DIAGNOSIS:  Negative for intraepithelial lesion or malignancy    Multiple factors can influence accuracy of Pap tests; therefore, screening at  regular                           intervals is necessary for early cancer detection.      SPECIMEN ADEQUACY:  SATISFACTORY FOR EVALUATION  Transformation zone is absent or insufficient.  Data is  conflicting on the significance of ECC/TZ elements, an  annual repeat Pap smear is suggested.  SOURCE OF SPECIMEN:       CERVICAL/ENDOCERVICAL  SLIDES:  1  CLINICAL HISTORY:  Pap 22 ASC, HR HPV negative.  ASCUS of cervix with negative high risk HPV      CYTOTECHNOLOGIST:             GLADIS RANGEL (ASCP)    CPT CODES:  42108     Results Encounter on 2023   Component Date Value Ref Range Status   • Cologuard 2023 Negative  Negative Final    Comment:   NEGATIVE TEST RESULT. A negative Cologuard result indicates a low likelihood that a colorectal cancer (CRC) or advanced adenoma (adenomatous polyps with more advanced pre-malignant features)  is present. The chance that a person with a negative Cologuard test has a colorectal cancer is less than 1 in 1500 (negative predictive value >99.9%) or has an  advanced adenoma is less than  5.3% (negative predictive value 94.7%). These data are based on a prospective cross-sectional study of 10,000 individuals at average risk for colorectal cancer who were screened with both Cologuard and colonoscopy. (Keith ALEXANDER et al, N  Engl J Med 2014;370(14):3155-4925) The normal value (reference range) for this assay is negative.    COLOGUARD RE-SCREENING RECOMMENDATION: Periodic colorectal cancer screening is an important part of preventive healthcare for asymptomatic individuals at average risk for colorectal cancer.  Following a negative Cologuard result, the American Cancer Society and U.S.                            Multi-Society Task Force screening guidelines recommend a Cologuard re-screening interval of 3 years.   References: American Cancer Society Guideline for Colorectal Cancer Screening: https://www.cancer.org/cancer/colon-rectal-cancer/dxqlpsygu-hzbtqczdq-nqfpafi/acs-recommendations.html.; Ramírez DK, Skip DAY, Jelani FERNANDESK, Colorectal Cancer Screening: Recommendations for Physicians and Patients from the U.S. Multi-Society Task Force on Colorectal Cancer Screening , Am J Gastroenterology 2017; 112:5388-7292.    TEST DESCRIPTION: Composite algorithmic analysis of stool DNA-biomarkers with hemoglobin immunoassay.   Quantitative values of individual biomarkers are not reportable and are not associated with individual biomarker result reference ranges. Cologuard is intended for colorectal cancer screening of adults of either sex, 45 years or older, who are at average-risk for colorectal cancer (CRC). Cologuard has been approved for use by the U.S. FDA. The performance of Cologuard was                            established in a cross sectional study of average-risk adults aged 50-84. Cologuard performance in patients ages 45 to 49 years was estimated by sub-group analysis of near-age groups. Colonoscopies performed for a positive result may find as the most clinically significant lesion: colorectal cancer [4.0%], advanced adenoma (including sessile serrated polyps greater than or equal to 1cm diameter) [20%] or non- advanced adenoma [31%]; or no colorectal neoplasia [45%]. These estimates are derived from a prospective cross-sectional  screening study of 10,000 individuals at average risk for colorectal cancer who were screened with both Cologuard and colonoscopy. (Keith Don al, N Engl J Med 2014;370(14):3990-4297.) Cologuard may produce a false negative or false positive result (no colorectal cancer or precancerous polyp present at colonoscopy follow up). A negative Cologuard test result does not guarantee the absence of CRC or advanced adenoma (pre-cancer). The current Cologuard                            screening interval is every 3 years. (American Cancer Society and U.S. Multi-Society Task Force). Cologuard performance data in a 10,000 patient pivotal study using colonoscopy as the reference method can be accessed at the following location: www.[email or web address]/results. Additional description of the Cologuard test process, warnings and precautions can be found at www.[email or web address].TuTanda.     Lab on 01/12/2023   Component Date Value Ref Range Status   • Glucose 01/12/2023 85  65 - 99 mg/dL Final   • BUN 01/12/2023 8  6 - 20 mg/dL Final   • Creatinine 01/12/2023 0.76  0.57 - 1.00 mg/dL Final   • Sodium 01/12/2023 141  136 - 145 mmol/L Final   • Potassium 01/12/2023 3.8  3.5 - 5.2 mmol/L Final   • Chloride 01/12/2023 103  98 - 107 mmol/L Final   • CO2 01/12/2023 26.9  22.0 - 29.0 mmol/L Final   • Calcium 01/12/2023 10.1  8.6 - 10.5 mg/dL Final   • Total Protein 01/12/2023 7.3  6.0 - 8.5 g/dL Final   • Albumin 01/12/2023 4.8  3.5 - 5.2 g/dL Final   • ALT (SGPT) 01/12/2023 40 (H)  1 - 33 U/L Final   • AST (SGOT) 01/12/2023 36 (H)  1 - 32 U/L Final   • Alkaline Phosphatase 01/12/2023 89  39 - 117 U/L Final   • Total Bilirubin 01/12/2023 0.7  0.0 - 1.2 mg/dL Final   • Globulin 01/12/2023 2.5  gm/dL Final   • A/G Ratio 01/12/2023 1.9  g/dL Final   • BUN/Creatinine Ratio 01/12/2023 10.5  7.0 - 25.0 Final   • Anion Gap 01/12/2023 11.1  5.0 - 15.0 mmol/L Final   • eGFR 01/12/2023 91.5  >60.0 mL/min/1.73 Final    National Kidney Foundation and American  Society of Nephrology (ASN) Task Force recommended calculation based on the Chronic Kidney Disease Epidemiology Collaboration (CKD-EPI) equation refit without adjustment for race.   • Hemoglobin A1C 01/12/2023 5.10  4.80 - 5.60 % Final   • Total Cholesterol 01/12/2023 159  0 - 200 mg/dL Final   • Triglycerides 01/12/2023 56  0 - 150 mg/dL Final   • HDL Cholesterol 01/12/2023 62 (H)  40 - 60 mg/dL Final   • LDL Cholesterol  01/12/2023 86  0 - 100 mg/dL Final   • VLDL Cholesterol 01/12/2023 11  5 - 40 mg/dL Final   • LDL/HDL Ratio 01/12/2023 1.38   Final   • TSH 01/12/2023 1.610  0.270 - 4.200 uIU/mL Final   • Vitamin B-12 01/12/2023 >2,000 (H)  211 - 946 pg/mL Final   • 25 Hydroxy, Vitamin D 01/12/2023 56.0  30.0 - 100.0 ng/ml Final   • WBC 01/12/2023 8.15  3.40 - 10.80 10*3/mm3 Final   • RBC 01/12/2023 5.17  3.77 - 5.28 10*6/mm3 Final   • Hemoglobin 01/12/2023 15.5  12.0 - 15.9 g/dL Final   • Hematocrit 01/12/2023 44.5  34.0 - 46.6 % Final   • MCV 01/12/2023 86.1  79.0 - 97.0 fL Final   • MCH 01/12/2023 30.0  26.6 - 33.0 pg Final   • MCHC 01/12/2023 34.8  31.5 - 35.7 g/dL Final   • RDW 01/12/2023 12.5  12.3 - 15.4 % Final   • RDW-SD 01/12/2023 39.5  37.0 - 54.0 fl Final   • MPV 01/12/2023 9.7  6.0 - 12.0 fL Final   • Platelets 01/12/2023 313  140 - 450 10*3/mm3 Final   • Neutrophil % 01/12/2023 55.7  42.7 - 76.0 % Final   • Lymphocyte % 01/12/2023 31.7  19.6 - 45.3 % Final   • Monocyte % 01/12/2023 9.2  5.0 - 12.0 % Final   • Eosinophil % 01/12/2023 2.6  0.3 - 6.2 % Final   • Basophil % 01/12/2023 0.6  0.0 - 1.5 % Final   • Immature Grans % 01/12/2023 0.2  0.0 - 0.5 % Final   • Neutrophils, Absolute 01/12/2023 4.54  1.70 - 7.00 10*3/mm3 Final   • Lymphocytes, Absolute 01/12/2023 2.58  0.70 - 3.10 10*3/mm3 Final   • Monocytes, Absolute 01/12/2023 0.75  0.10 - 0.90 10*3/mm3 Final   • Eosinophils, Absolute 01/12/2023 0.21  0.00 - 0.40 10*3/mm3 Final   • Basophils, Absolute 01/12/2023 0.05  0.00 - 0.20 10*3/mm3 Final    • Immature Grans, Absolute 01/12/2023 0.02  0.00 - 0.05 10*3/mm3 Final   • nRBC 01/12/2023 0.1  0.0 - 0.2 /100 WBC Final        Radiology Results        Assessment / Plan         Assessment and Plan   Diagnoses and all orders for this visit:    1. Primary insomnia (Primary)  Assessment & Plan:  Starting Ramelteon for insomnia     Orders:  -     ramelteon (ROZEREM) 8 MG tablet; Take 1 tablet by mouth Every Night.  Dispense: 30 tablet; Refill: 0    2. Essential hypertension  Assessment & Plan:  Hypertension is improving with treatment.  Continue current treatment regimen.  Dietary sodium restriction.  Weight loss.  Regular aerobic exercise.  Continue current medications.  Blood pressure will be reassessed in 3 months.      3. Seasonal allergies  -     fexofenadine (ALLEGRA) 180 MG tablet; Take 1 tablet by mouth Daily.  Dispense: 90 tablet; Refill: 1  -     fluticasone (FLONASE) 50 MCG/ACT nasal spray; 2 sprays into the nostril(s) as directed by provider Daily.  Dispense: 15.8 mL; Refill: 5    4. Rash and nonspecific skin eruption  Assessment & Plan:  Refractory to topical triamcinolone.  She has a upcoming appointment with dermatology.  Prescribing Medrol Dosepak    Orders:  -     methylPREDNISolone (MEDROL) 4 MG dose pack; Take as directed on package instructions.  Dispense: 21 tablet; Refill: 0  -     triamcinolone (KENALOG) 0.1 % ointment; Apply 1 application topically to the appropriate area as directed 2 (Two) Times a Day.  Dispense: 30 g; Refill: 1    5. Class 2 severe obesity with serious comorbidity and body mass index (BMI) of 39.0 to 39.9 in adult, unspecified obesity type  Assessment & Plan:  Patient's (Body mass index is 39.06 kg/m².) indicates that they are obese (BMI >30) with health conditions that include obstructive sleep apnea and hypertension . Weight is improving with lifestyle modifications. BMI  is above average; BMI management plan is completed. We discussed low calorie, low carb based diet  program, portion control and pharmacologic options including GLP-1.       She has failed multiple attempts at weight loss despite calorie restriction/counting, exercising regularly.  Per guidelines, She is a candidate for a GLP-1 agonist to for weight loss due to their BMI.  She has no personal history of pancreatitis, no family history of MEN syndrome or medullary thyroid cancer. Possible side effects including nausea, bloating, other GI upset and rarely pancreatitis were discussed. She was advised to call the office with any symptoms or concerns.           Orders:  -     Semaglutide-Weight Management (Wegovy) 0.25 MG/0.5ML solution auto-injector; Inject 0.25 mg under the skin into the appropriate area as directed 1 (One) Time Per Week for 28 days.  Dispense: 2 mL; Refill: 0      Discussed possible differential diagnoses, testing, treatment, recommended non-pharmacological interventions, risks, warning signs to monitor for that would indicate need for follow-up in clinic or ER. If no improvement with these regimens or you have new or worsening symptoms follow-up. Patient verbalizes understanding and agreement with plan of care. Denies further needs or concerns.     Patient was given instructions and counseling regarding her condition and for health maintenance advised.        Health Maintenance  Health Maintenance:   Health Maintenance Due   Topic Date Due   • COVID-19 Vaccine (1) Never done   • ZOSTER VACCINE (1 of 2) Never done   • DXA SCAN  2022   • COLORECTAL CANCER SCREENING  2023        Meds ordered during this visit  New Medications Ordered This Visit   Medications   • ramelteon (ROZEREM) 8 MG tablet     Sig: Take 1 tablet by mouth Every Night.     Dispense:  30 tablet     Refill:  0   • fexofenadine (ALLEGRA) 180 MG tablet     Sig: Take 1 tablet by mouth Daily.     Dispense:  90 tablet     Refill:  1   • fluticasone (FLONASE) 50 MCG/ACT nasal spray     Si sprays into the nostril(s) as  directed by provider Daily.     Dispense:  15.8 mL     Refill:  5   • methylPREDNISolone (MEDROL) 4 MG dose pack     Sig: Take as directed on package instructions.     Dispense:  21 tablet     Refill:  0   • triamcinolone (KENALOG) 0.1 % ointment     Sig: Apply 1 application topically to the appropriate area as directed 2 (Two) Times a Day.     Dispense:  30 g     Refill:  1   • Semaglutide-Weight Management (Wegovy) 0.25 MG/0.5ML solution auto-injector     Sig: Inject 0.25 mg under the skin into the appropriate area as directed 1 (One) Time Per Week for 28 days.     Dispense:  2 mL     Refill:  0       Meds stopped during this visit:  Medications Discontinued During This Encounter   Medication Reason   • fexofenadine (ALLEGRA) 180 MG tablet Reorder   • fluticasone (FLONASE) 50 MCG/ACT nasal spray Reorder   • triamcinolone (KENALOG) 0.1 % ointment Reorder        Visit Diagnoses    ICD-10-CM ICD-9-CM   1. Primary insomnia  F51.01 307.42   2. Essential hypertension  I10 401.9   3. Seasonal allergies  J30.2 477.9   4. Rash and nonspecific skin eruption  R21 782.1   5. Class 2 severe obesity with serious comorbidity and body mass index (BMI) of 39.0 to 39.9 in adult, unspecified obesity type  E66.01 278.01    Z68.39 V85.39       Patient was given instructions and counseling regarding her condition or for health maintenance advice. Please see specific information pulled into the AVS if appropriate.     Follow Up   Return in about 4 weeks (around 5/9/2023) for Recheck.    This document has been electronically signed by Beth Dill DO   April 11, 2023 12:22 EDT    Dictated Utilizing Dragon Dictation: Part of this note may be an electronic transcription/translation of spoken language to printed text using the Dragon Dictation System.    Beth Dill D.O.  [facility] [place]

## 2023-04-11 NOTE — ASSESSMENT & PLAN NOTE
Patient's (Body mass index is 39.06 kg/m².) indicates that they are obese (BMI >30) with health conditions that include obstructive sleep apnea and hypertension . Weight is improving with lifestyle modifications. BMI  is above average; BMI management plan is completed. We discussed low calorie, low carb based diet program, portion control and pharmacologic options including GLP-1.       She has failed multiple attempts at weight loss despite calorie restriction/counting, exercising regularly.  Per guidelines, She is a candidate for a GLP-1 agonist to for weight loss due to their BMI.  She has no personal history of pancreatitis, no family history of MEN syndrome or medullary thyroid cancer. Possible side effects including nausea, bloating, other GI upset and rarely pancreatitis were discussed. She was advised to call the office with any symptoms or concerns.

## 2023-04-11 NOTE — ASSESSMENT & PLAN NOTE
Refractory to topical triamcinolone.  She has a upcoming appointment with dermatology.  Prescribing Medrol Dosepak

## 2023-04-13 ENCOUNTER — TELEPHONE (OUTPATIENT)
Dept: OBSTETRICS AND GYNECOLOGY | Facility: CLINIC | Age: 58
End: 2023-04-13
Payer: COMMERCIAL

## 2023-04-13 ENCOUNTER — PRIOR AUTHORIZATION (OUTPATIENT)
Dept: FAMILY MEDICINE CLINIC | Facility: CLINIC | Age: 58
End: 2023-04-13

## 2023-04-13 RX ORDER — ESTRADIOL 10 UG/1
1 INSERT VAGINAL 2 TIMES WEEKLY
Qty: 8 TABLET | Refills: 10 | Status: SHIPPED | OUTPATIENT
Start: 2023-04-13

## 2023-04-13 NOTE — TELEPHONE ENCOUNTER
Rx Refill Note  Patient requests prescription refill.  Appointment 2/28/2024.  Requested Prescriptions      No prescriptions requested or ordered in this encounter      Last office visit with prescribing clinician: 2/27/2023   Last telemedicine visit with prescribing clinician: Visit date not found   Next office visit with prescribing clinician: 2/28/2024                         Would you like a call back once the refill request has been completed: [] Yes [] No    If the office needs to give you a call back, can they leave a voicemail: [] Yes [] No    Claudine Torres MA  04/13/23, 08:21 EDT

## 2023-04-13 NOTE — TELEPHONE ENCOUNTER
4/13/23      PA sent to plan      NISH MOODY  (Key: [id number])  Rx #: [id number]  Wegovy 0.25MG/0.5ML auto-injectors     Form  [facility] Non-Medicare Formulary Exception / Prior Authorization Request Form   Plan Contact  [phone] phone  [phone] fax

## 2023-04-13 NOTE — TELEPHONE ENCOUNTER
----- Message from Natalie Kan sent at 4/12/2023  5:27 PM EDT -----  Regarding: Refill needed  Contact: [phone]  Is it possible to have the Estradiol prescription sent to [facility] pharmacy at [facility]? I have enough for two more weeks.     Thanks

## 2023-04-18 NOTE — TELEPHONE ENCOUNTER
More information requested and sent in  04/18/2023    NISH MOODY   (Key: [id number])  Rx #: [id number]  Wegovy 0.25MG/0.5ML auto-injectors

## 2023-04-27 RX ORDER — AMITRIPTYLINE HYDROCHLORIDE 25 MG/1
25 TABLET, FILM COATED ORAL NIGHTLY
Qty: 30 TABLET | Refills: 1 | Status: SHIPPED | OUTPATIENT
Start: 2023-04-27

## 2023-05-04 ENCOUNTER — OFFICE VISIT (OUTPATIENT)
Dept: FAMILY MEDICINE CLINIC | Facility: CLINIC | Age: 58
End: 2023-05-04
Payer: COMMERCIAL

## 2023-05-04 VITALS
TEMPERATURE: 98.2 F | WEIGHT: 192 LBS | HEART RATE: 82 BPM | BODY MASS INDEX: 37.69 KG/M2 | HEIGHT: 60 IN | SYSTOLIC BLOOD PRESSURE: 128 MMHG | DIASTOLIC BLOOD PRESSURE: 68 MMHG | OXYGEN SATURATION: 98 %

## 2023-05-04 DIAGNOSIS — E66.01 CLASS 2 SEVERE OBESITY WITH SERIOUS COMORBIDITY AND BODY MASS INDEX (BMI) OF 39.0 TO 39.9 IN ADULT, UNSPECIFIED OBESITY TYPE: Primary | ICD-10-CM

## 2023-05-04 DIAGNOSIS — F51.01 PRIMARY INSOMNIA: ICD-10-CM

## 2023-05-04 RX ORDER — SEMAGLUTIDE 0.5 MG/.5ML
0.5 INJECTION, SOLUTION SUBCUTANEOUS WEEKLY
Qty: 3 ML | Refills: 0 | Status: SHIPPED | OUTPATIENT
Start: 2023-05-04

## 2023-05-04 NOTE — PROGRESS NOTES
"Answers for HPI/ROS submitted by the patient on 5/3/2023  Please describe your symptoms.: Follow up for Wegovy  Have you had these symptoms before?: No  How long have you been having these symptoms?: Greater than 2 weeks  What is the primary reason for your visit?: Other    Chief Complaint  Weight Check (Wegovy check in)    Subjective        Natalie Kan presents to [facility]  History of Present Illness     Ms. Kan is a very pleasant 58-year-old female presents today to follow-up after a Wegovy prescription.  She started it just 2 weeks ago and has lost 4 pounds already.  She is also now following with the gym and a  etc.  She reports that she has more energy and is feeling better overall.    Also since I last saw her we changed her prescription for insomnia to Elavil.  She reports sleeping better and feeling more rested when she wakes up in the morning        Objective   Vital Signs:  /68   Pulse 82   Temp 98.2 °F (36.8 °C) (Infrared)   Ht 152.4 cm (60\")   Wt 87.1 kg (192 lb)   SpO2 98%   BMI 37.50 kg/m²   Estimated body mass index is 37.5 kg/m² as calculated from the following:    Height as of this encounter: 152.4 cm (60\").    Weight as of this encounter: 87.1 kg (192 lb).       Class 2 Severe Obesity (BMI >=35 and <=39.9). Obesity-related health conditions include the following: obstructive sleep apnea, hypertension and osteoarthritis. Obesity is improving with treatment. BMI is is above average; BMI management plan is completed. We discussed pharmacologic options including Wegovy .      Physical Exam  Constitutional:       Appearance: She is not ill-appearing.   Eyes:      Pupils: Pupils are equal, round, and reactive to light.   Cardiovascular:      Rate and Rhythm: Normal rate.      Pulses: Normal pulses.   Pulmonary:      Effort: Pulmonary effort is normal.      Breath sounds: Normal breath sounds.   Neurological:      General: No focal " deficit present.      Mental Status: She is alert. Mental status is at baseline.   Psychiatric:         Mood and Affect: Mood normal.         Behavior: Behavior normal.         Thought Content: Thought content normal.         Judgment: Judgment normal.        Result Review :               Assessment and Plan   Diagnoses and all orders for this visit:    1. Class 2 severe obesity with serious comorbidity and body mass index (BMI) of 39.0 to 39.9 in adult, unspecified obesity type (Primary)  Assessment & Plan:  Patient's (Body mass index is 37.5 kg/m².) indicates that they are obese (BMI >30) with health conditions that include obstructive sleep apnea and hypertension . Weight is improving with treatment. BMI  is above average; BMI management plan is completed. We discussed low calorie, low carb based diet program, portion control, increasing exercise and pharmacologic options including Wegovy .   Patient tolerating Wegovy very well.  Continue this and increasing dosage to 0.5 mg after she finishes 1 month.    Orders:  -     Semaglutide-Weight Management (Wegovy) 0.5 MG/0.5ML solution auto-injector; Inject 0.5 mL under the skin into the appropriate area as directed 1 (One) Time Per Week.  Dispense: 3 mL; Refill: 0    2. Primary insomnia  Assessment & Plan:  Much improving now that she is taking Elavil.           Follow Up   Return in about 3 months (around 8/4/2023) for Recheck.  Patient was given instructions and counseling regarding her condition or for health maintenance advice. Please see specific information pulled into the AVS if appropriate.       This document has been electronically signed by Beth Dill DO   May 4, 2023 11:40 EDT    Dictated Utilizing Dragon Dictation: Part of this note may be an electronic transcription/translation of spoken language to printed text using the Dragon Dictation System.    Beth Dill D.O.  [facility]

## 2023-05-04 NOTE — ASSESSMENT & PLAN NOTE
Patient's (Body mass index is 37.5 kg/m².) indicates that they are obese (BMI >30) with health conditions that include obstructive sleep apnea and hypertension . Weight is improving with treatment. BMI  is above average; BMI management plan is completed. We discussed low calorie, low carb based diet program, portion control, increasing exercise and pharmacologic options including Wegovy .   Patient tolerating Wegovy very well.  Continue this and increasing dosage to 0.5 mg after she finishes 1 month.

## 2023-05-09 DIAGNOSIS — I10 ESSENTIAL HYPERTENSION: ICD-10-CM

## 2023-05-14 RX ORDER — LISINOPRIL AND HYDROCHLOROTHIAZIDE 20; 12.5 MG/1; MG/1
1 TABLET ORAL DAILY
Qty: 30 TABLET | Refills: 0 | OUTPATIENT
Start: 2023-05-14

## 2023-05-30 RX ORDER — AMITRIPTYLINE HYDROCHLORIDE 25 MG/1
25 TABLET, FILM COATED ORAL NIGHTLY
Qty: 30 TABLET | Refills: 1 | Status: SHIPPED | OUTPATIENT
Start: 2023-05-30

## 2023-06-01 DIAGNOSIS — E66.01 CLASS 2 SEVERE OBESITY WITH SERIOUS COMORBIDITY AND BODY MASS INDEX (BMI) OF 39.0 TO 39.9 IN ADULT, UNSPECIFIED OBESITY TYPE: ICD-10-CM

## 2023-06-01 RX ORDER — SEMAGLUTIDE 0.5 MG/.5ML
0.5 INJECTION, SOLUTION SUBCUTANEOUS WEEKLY
Qty: 3 ML | Refills: 0 | Status: SHIPPED | OUTPATIENT
Start: 2023-06-01

## 2023-06-01 NOTE — TELEPHONE ENCOUNTER
Rx Refill Note  Requested Prescriptions     Pending Prescriptions Disp Refills   • Semaglutide-Weight Management (Wegovy) 0.5 MG/0.5ML solution auto-injector 3 mL 0     Sig: Inject 0.5 mL under the skin into the appropriate area as directed 1 (One) Time Per Week.      Last office visit with prescribing clinician: 5/4/2023   Last telemedicine visit with prescribing clinician: Visit date not found   Next office visit with prescribing clinician: Visit date not found                         Would you like a call back once the refill request has been completed: [] Yes [] No    If the office needs to give you a call back, can they leave a voicemail: [] Yes [] No    Hosea Briceno MA  06/01/23, 13:18 EDT

## 2023-06-07 ENCOUNTER — OFFICE VISIT (OUTPATIENT)
Dept: FAMILY MEDICINE CLINIC | Facility: CLINIC | Age: 58
End: 2023-06-07
Payer: COMMERCIAL

## 2023-06-07 VITALS
SYSTOLIC BLOOD PRESSURE: 124 MMHG | BODY MASS INDEX: 37.38 KG/M2 | WEIGHT: 185.4 LBS | DIASTOLIC BLOOD PRESSURE: 90 MMHG | HEART RATE: 95 BPM | HEIGHT: 59 IN | TEMPERATURE: 99.1 F | OXYGEN SATURATION: 95 %

## 2023-06-07 DIAGNOSIS — H66.92 BACTERIAL INFECTION OF LEFT EAR: Primary | ICD-10-CM

## 2023-06-07 DIAGNOSIS — B96.89 BACTERIAL INFECTION OF LEFT EAR: Primary | ICD-10-CM

## 2023-06-07 PROBLEM — R06.02 SHORTNESS OF BREATH: Status: ACTIVE | Noted: 2023-06-07

## 2023-06-07 PROBLEM — R07.89 ATYPICAL CHEST PAIN: Status: ACTIVE | Noted: 2023-06-07

## 2023-06-07 PROBLEM — G43.909 MIGRAINE: Status: ACTIVE | Noted: 2023-06-07

## 2023-06-07 PROCEDURE — 99213 OFFICE O/P EST LOW 20 MIN: CPT | Performed by: STUDENT IN AN ORGANIZED HEALTH CARE EDUCATION/TRAINING PROGRAM

## 2023-06-07 RX ORDER — CIPROFLOXACIN AND DEXAMETHASONE 3; 1 MG/ML; MG/ML
4 SUSPENSION/ DROPS AURICULAR (OTIC) 2 TIMES DAILY
Qty: 7.5 ML | Refills: 0 | Status: SHIPPED | OUTPATIENT
Start: 2023-06-07

## 2023-06-07 RX ORDER — CEFDINIR 300 MG/1
300 CAPSULE ORAL 2 TIMES DAILY
Qty: 14 CAPSULE | Refills: 0 | Status: SHIPPED | OUTPATIENT
Start: 2023-06-07 | End: 2023-06-14

## 2023-06-07 RX ORDER — METHYLPREDNISOLONE 4 MG/1
TABLET ORAL
Qty: 21 TABLET | Refills: 0 | Status: SHIPPED | OUTPATIENT
Start: 2023-06-07

## 2023-06-07 NOTE — ASSESSMENT & PLAN NOTE
- Patient with findings consistent with an otitis media and an otitis externa of the left ear  - Though possible, it is interesting that she has such symptoms of sharp pains that last for 4 to 5 hours, I am concerned that maybe there might be a trigeminal neuralgia versus a TMJ component here  - I advised patient to start cefdinir 300 mg for 7 days to treat otitis media and then Ciprodex drops for otitis externa  - Advised patient to start Medrol Dosepak if she does have the sharp pain  - If no improvement in symptoms, advised patient to return to clinic

## 2023-06-07 NOTE — PROGRESS NOTES
Office Note     Name: Natalie Kan    : 1965     MRN: [id number]     Chief Complaint  Earache (Off and on for the past week. Pain can get really bad. She have tried multiple OTC and heat compresses but nothing is working. Has now moved down into her teeth. ) and Eye Drainage    Subjective     History of Present Illness:  Natalie Kan is a 58 y.o. female who presents today for earache and eye drainage.  Patient reports that she has had an left earache on and off for the past week.  The pain can get really bad, sharp.  Reports that the pain is intermittent and moves into her teeth.  The pain can last anywhere from 4 to 5 hours.  Patient has tried Tylenol and ibuprofen for pain control and nothing seems to help.  Patient did have a recent dental exam in April and was told that everything was okay.  The pain does feel like a bad toothache when it radiates into her jaw.  Patient reports that she did have a very bad sinus infection for which she did not see a doctor that lasted approximately 10 days.  Has been using Flonase and an ear lavage.  Patient denies any fevers.  Patient does report some mild eye drainage, her right eye is slightly red compared to her left.  Denies any tearing, denies any discharge.  Denies any burning, itching, or pain.            Objective     Past Medical History:   Diagnosis Date    Abnormal Pap smear of cervix     ASCUS    Contraception management     Nml PAP with HPV neg 2013    Dysfunctional uterine bleeding     History of prior pregnancies     A1    Hypertension     Malignant neoplasm     SQUAMOUS     Migraine     Non-healing skin lesion     SOB (shortness of breath)     Uterine leiomyoma      Past Surgical History:   Procedure Laterality Date    APPENDECTOMY       SECTION       SECTION      MOHS SURGERY      micrographic surgery face right side - due to squamous cell carcinoma    WISDOM TOOTH EXTRACTION       Family History  "  Problem Relation Age of Onset    Migraines Other     Skin cancer Other     Cancer Other     Hypertension Other     Migraines Mother     Cancer Mother         Malignant Neoplasm of left eye/ Renal cell cancer    Hypertension Sister     Migraines Brother     Cancer Father     Breast cancer Neg Hx     Ovarian cancer Neg Hx        Vital Signs  /90   Pulse 95   Temp 99.1 °F (37.3 °C) (Infrared)   Ht 149.9 cm (59\")   Wt 84.1 kg (185 lb 6.4 oz)   SpO2 95%   BMI 37.45 kg/m²   Estimated body mass index is 37.45 kg/m² as calculated from the following:    Height as of this encounter: 149.9 cm (59\").    Weight as of this encounter: 84.1 kg (185 lb 6.4 oz).    Physical Exam  Vitals reviewed.   Constitutional:       Appearance: Normal appearance.   HENT:      Head: Normocephalic and atraumatic.      Right Ear: Tympanic membrane normal.      Ears:      Comments: Left TM with erythema and pus behind tympanic membrane, erythematous left canal     Nose: Nose normal.      Mouth/Throat:      Mouth: Mucous membranes are moist.   Eyes:      Conjunctiva/sclera: Conjunctivae normal.   Cardiovascular:      Rate and Rhythm: Normal rate and regular rhythm.   Pulmonary:      Effort: Pulmonary effort is normal.      Breath sounds: Normal breath sounds.   Neurological:      Mental Status: She is alert.             Assessment and Plan     Diagnoses and all orders for this visit:    1. Bacterial infection of left ear (Primary)  Assessment & Plan:  - Patient with findings consistent with an otitis media and an otitis externa of the left ear  - Though possible, it is interesting that she has such symptoms of sharp pains that last for 4 to 5 hours, I am concerned that maybe there might be a trigeminal neuralgia versus a TMJ component here  - I advised patient to start cefdinir 300 mg for 7 days to treat otitis media and then Ciprodex drops for otitis externa  - Advised patient to start Medrol Dosepak if she does have the sharp pain  - " If no improvement in symptoms, advised patient to return to clinic      Other orders  -     ciprofloxacin-dexamethasone (Ciprodex) 0.3-0.1 % otic suspension; Administer 4 drops into the left ear 2 (Two) Times a Day. For 7 days  Dispense: 7.5 mL; Refill: 0  -     cefdinir (OMNICEF) 300 MG capsule; Take 1 capsule by mouth 2 (Two) Times a Day for 7 days.  Dispense: 14 capsule; Refill: 0  -     methylPREDNISolone (MEDROL) 4 MG dose pack; Take as directed on package instructions.  Dispense: 21 tablet; Refill: 0      Follow Up  No follow-ups on file.    Sofie Garzon MD

## 2023-07-11 PROBLEM — H92.02 LEFT EAR PAIN: Status: ACTIVE | Noted: 2023-07-11

## 2023-08-21 DIAGNOSIS — E66.01 CLASS 2 SEVERE OBESITY WITH SERIOUS COMORBIDITY AND BODY MASS INDEX (BMI) OF 39.0 TO 39.9 IN ADULT, UNSPECIFIED OBESITY TYPE: ICD-10-CM

## 2023-08-21 RX ORDER — SEMAGLUTIDE 0.5 MG/.5ML
0.5 INJECTION, SOLUTION SUBCUTANEOUS WEEKLY
Qty: 2 ML | Refills: 0 | Status: SHIPPED | OUTPATIENT
Start: 2023-08-21

## 2023-08-30 DIAGNOSIS — J30.2 SEASONAL ALLERGIES: ICD-10-CM

## 2023-08-30 RX ORDER — FLUTICASONE PROPIONATE 50 MCG
SPRAY, SUSPENSION (ML) NASAL
Qty: 16 G | Refills: 0 | Status: SHIPPED | OUTPATIENT
Start: 2023-08-30

## 2023-08-30 RX ORDER — FLUTICASONE PROPIONATE 50 MCG
SPRAY, SUSPENSION (ML) NASAL
Qty: 16 G | Refills: 0 | Status: SHIPPED | OUTPATIENT
Start: 2023-08-30 | End: 2023-08-30

## 2023-09-12 DIAGNOSIS — E66.01 CLASS 2 SEVERE OBESITY WITH SERIOUS COMORBIDITY AND BODY MASS INDEX (BMI) OF 39.0 TO 39.9 IN ADULT, UNSPECIFIED OBESITY TYPE: ICD-10-CM

## 2023-09-12 RX ORDER — SEMAGLUTIDE 0.5 MG/.5ML
0.5 INJECTION, SOLUTION SUBCUTANEOUS WEEKLY
Qty: 2 ML | Refills: 0 | Status: CANCELLED | OUTPATIENT
Start: 2023-09-12

## 2023-09-13 RX ORDER — SEMAGLUTIDE 1 MG/.5ML
1 INJECTION, SOLUTION SUBCUTANEOUS WEEKLY
Qty: 3 ML | Refills: 0 | Status: SHIPPED | OUTPATIENT
Start: 2023-09-13

## 2023-10-06 DIAGNOSIS — J30.2 SEASONAL ALLERGIES: ICD-10-CM

## 2023-10-06 RX ORDER — FLUTICASONE PROPIONATE 50 MCG
SPRAY, SUSPENSION (ML) NASAL
Qty: 48 G | Refills: 5 | Status: SHIPPED | OUTPATIENT
Start: 2023-10-06

## 2023-10-10 ENCOUNTER — OFFICE VISIT (OUTPATIENT)
Dept: FAMILY MEDICINE CLINIC | Facility: CLINIC | Age: 58
End: 2023-10-10
Payer: COMMERCIAL

## 2023-10-10 VITALS
WEIGHT: 178.6 LBS | SYSTOLIC BLOOD PRESSURE: 110 MMHG | DIASTOLIC BLOOD PRESSURE: 76 MMHG | BODY MASS INDEX: 36 KG/M2 | TEMPERATURE: 99.8 F | HEIGHT: 59 IN | HEART RATE: 80 BPM

## 2023-10-10 DIAGNOSIS — E66.01 CLASS 2 SEVERE OBESITY WITH SERIOUS COMORBIDITY AND BODY MASS INDEX (BMI) OF 36.0 TO 36.9 IN ADULT, UNSPECIFIED OBESITY TYPE: Primary | ICD-10-CM

## 2023-10-10 PROCEDURE — 99214 OFFICE O/P EST MOD 30 MIN: CPT | Performed by: FAMILY MEDICINE

## 2023-10-10 RX ORDER — FLUOCINONIDE 0.5 MG/G
OINTMENT TOPICAL
COMMUNITY
Start: 2023-09-20

## 2023-10-10 RX ORDER — SEMAGLUTIDE 1.7 MG/.75ML
1.7 INJECTION, SOLUTION SUBCUTANEOUS WEEKLY
Qty: 3 ML | Refills: 0 | Status: SHIPPED | OUTPATIENT
Start: 2023-10-10

## 2023-10-10 NOTE — PROGRESS NOTES
Subjective     Chief Complaint  Weight Loss (F/u on wegovy)    Subjective          Natalie Kan is a 58 y.o. female who presents today to [facility] for follow up.    HPI:   History of Present Illness      Ms. Kan is a very pleasant 58-year-old female presents today to follow-up after a Wegovy prescription.       Her starting weight was 196 lbs. Today, she weighed 178 lbs. - Down 18 lbs -  She is also now following with the gym and a  etc.  She reports that she has more energy and is feeling better overall.  She is currently prescribed Wegovy 1 mg weekly.  Her current weight today is, 178. By her home scales she is down 25 lbs. She reports that she is feeling great and exercising during the week. She has some nausea for about 4 days after taking the medication. It is manageable though, no vomiting etc.     Her goal weight is 150 lbs.     The following portions of the patient's history were reviewed and updated as appropriate: allergies, current medications, past family history, past medical history, past social history, past surgical history and problem list.    Objective     Objective     Allergy:   Allergies   Allergen Reactions    Augmentin [Amoxicillin-Pot Clavulanate] GI Intolerance        Current Medications:   Current Outpatient Medications   Medication Sig Dispense Refill    amitriptyline (ELAVIL) 25 MG tablet Take 1 tablet by mouth Every Night. 90 tablet 1    calcium carbonate (OS-TORREY) 600 MG tablet Take 1 tablet by mouth Daily.      estradiol (Vagifem) 10 MCG tablet vaginal tablet Insert 1 tablet into the vagina 2 (Two) Times a Week. 8 tablet 10    fexofenadine (ALLEGRA) 180 MG tablet Take 1 tablet by mouth Daily. 90 tablet 1    fluocinonide (LIDEX) 0.05 % ointment APPLY TOPICALLY TO THE AFFECTED AREA TWICE DAILY FOR 2 WEEKS      fluticasone (FLONASE) 50 MCG/ACT nasal spray INSTILL 2 SPRAYS IN EACH NOSTRIL AS DIRECTED BY PROVIDER EVERY DAY 48  g 5    lisinopril-hydrochlorothiazide (PRINZIDE,ZESTORETIC) 20-12.5 MG per tablet TAKE 1 TABLET BY MOUTH DAILY 30 tablet 0    Multiple Vitamins-Minerals (MULTIVITAMIN ADULT PO) Take  by mouth.      Semaglutide-Weight Management (Wegovy) 1 MG/0.5ML solution auto-injector Inject 0.5 mL under the skin into the appropriate area as directed 1 (One) Time Per Week. 2 mL 0    Ubrelvy 100 MG tablet TAKE 1 TABLET BY MOUTH 1 TIME FOR 1 DOSE. MAY REPEAT IN 2 HOURS AS NEEDED       No current facility-administered medications for this visit.       Past Medical History:  Past Medical History:   Diagnosis Date    Abnormal Pap smear of cervix     ASCUS    Contraception management     Nml PAP with HPV neg 2013    Dysfunctional uterine bleeding     History of prior pregnancies     A1    Hypertension     Malignant neoplasm     SQUAMOUS     Migraine     Non-healing skin lesion     SOB (shortness of breath)     Uterine leiomyoma        Past Surgical History:  Past Surgical History:   Procedure Laterality Date    APPENDECTOMY       SECTION       SECTION      MOHS SURGERY      micrographic surgery face right side - due to squamous cell carcinoma    WISDOM TOOTH EXTRACTION         Social History:  Social History     Socioeconomic History    Marital status:    Tobacco Use    Smoking status: Never     Passive exposure: Never    Smokeless tobacco: Never   Vaping Use    Vaping Use: Never used   Substance and Sexual Activity    Alcohol use: No    Drug use: No    Sexual activity: Yes     Partners: Male     Birth control/protection: Post-menopausal       Family History:  Family History   Problem Relation Age of Onset    Migraines Other     Skin cancer Other     Cancer Other     Hypertension Other     Migraines Mother     Cancer Mother         Malignant Neoplasm of left eye/ Renal cell cancer    Hypertension Sister     Migraines Brother     Cancer Father     Breast cancer Neg Hx     Ovarian cancer Neg Hx   "    Vital Signs:   /76   Pulse 80   Temp 99.8 øF (37.7 øC) (Infrared)   Ht 149.4 cm (58.82\")   Wt 81 kg (178 lb 9.6 oz)   BMI 36.30 kg/mý      Physical Exam:  Physical Exam  Constitutional:       Appearance: Normal appearance.   Cardiovascular:      Rate and Rhythm: Normal rate.      Pulses: Normal pulses.   Pulmonary:      Effort: Pulmonary effort is normal.   Abdominal:      General: Abdomen is flat.   Skin:     General: Skin is warm.      Capillary Refill: Capillary refill takes less than 2 seconds.   Neurological:      General: No focal deficit present.      Mental Status: She is alert.               PHQ-9 Score  PHQ-9 Total Score:       Lab Review  No visits with results within 3 Month(s) from this visit.   Latest known visit with results is:   Office Visit on 02/28/2023   Component Date Value Ref Range Status    Rapid Strep A Screen 02/28/2023 Negative  Negative, VALID, INVALID, Not Performed Final    Internal Control 02/28/2023 Passed  Passed Final    Lot Number 02/28/2023 [id number]   Final    Expiration Date 02/28/2023 9/12/24   Final        Radiology Results        Assessment / Plan         Assessment and Plan   Diagnoses and all orders for this visit:    1. Class 2 severe obesity with serious comorbidity and body mass index (BMI) of 36.0 to 36.9 in adult, unspecified obesity type (Primary)  Assessment & Plan:  Patient's (Body mass index is 36.3 kg/mý.) indicates that they are obese (BMI >30) with health conditions that include obstructive sleep apnea and hypertension . Weight is improving with treatment. BMI  is above average; BMI management plan is completed. We discussed low calorie, low carb based diet program, portion control, increasing exercise and pharmacologic options including Wegovy  .   Patient tolerating Wegovy very well.  - she is seeing great results!! She is making a lot of lifestyle changes, as well. I commend her for this!           Discussed possible differential diagnoses, " testing, treatment, recommended non-pharmacological interventions, risks, warning signs to monitor for that would indicate need for follow-up in clinic or ER. If no improvement with these regimens or you have new or worsening symptoms follow-up. Patient verbalizes understanding and agreement with plan of care. Denies further needs or concerns.     Patient was given instructions and counseling regarding her condition and for health maintenance advised.        Health Maintenance  Health Maintenance:   There are no preventive care reminders to display for this patient.       Meds ordered during this visit  No orders of the defined types were placed in this encounter.      Meds stopped during this visit:  Medications Discontinued During This Encounter   Medication Reason    predniSONE (DELTASONE) 10 MG (48) dose pack *Therapy completed    Semaglutide-Weight Management (Wegovy) 1 MG/0.5ML solution auto-injector Patient Reported Not Taking    Semaglutide-Weight Management (Wegovy) 0.5 MG/0.5ML solution auto-injector Patient Reported Not Taking        Visit Diagnoses    ICD-10-CM ICD-9-CM   1. Class 2 severe obesity with serious comorbidity and body mass index (BMI) of 36.0 to 36.9 in adult, unspecified obesity type  E66.01 278.01    Z68.36 V85.36       Patient was given instructions and counseling regarding her condition or for health maintenance advice. Please see specific information pulled into the AVS if appropriate.     Follow Up   Return in about 3 months (around 1/10/2024) for Recheck.          This document has been electronically signed by Beth Dill DO   October 10, 2023 16:58 EDT    Dictated Utilizing Dragon Dictation: Part of this note may be an electronic transcription/translation of spoken language to printed text using the Dragon Dictation System.    Beth Dill D.O.  [facility]

## 2023-10-10 NOTE — ASSESSMENT & PLAN NOTE
Patient's (Body mass index is 36.3 kg/mý.) indicates that they are obese (BMI >30) with health conditions that include obstructive sleep apnea and hypertension . Weight is improving with treatment. BMI  is above average; BMI management plan is completed. We discussed low calorie, low carb based diet program, portion control, increasing exercise and pharmacologic options including Wegovy  .   Patient tolerating Wegovy very well.  - she is seeing great results!! She is making a lot of lifestyle changes, as well. I commend her for this!

## 2023-11-03 DIAGNOSIS — I10 ESSENTIAL HYPERTENSION: ICD-10-CM

## 2023-11-03 RX ORDER — LISINOPRIL AND HYDROCHLOROTHIAZIDE 20; 12.5 MG/1; MG/1
1 TABLET ORAL DAILY
Qty: 90 TABLET | Refills: 0 | Status: SHIPPED | OUTPATIENT
Start: 2023-11-03

## 2023-11-08 DIAGNOSIS — E66.01 CLASS 2 SEVERE OBESITY WITH SERIOUS COMORBIDITY AND BODY MASS INDEX (BMI) OF 36.0 TO 36.9 IN ADULT, UNSPECIFIED OBESITY TYPE: ICD-10-CM

## 2023-11-08 RX ORDER — SEMAGLUTIDE 1.7 MG/.75ML
1.7 INJECTION, SOLUTION SUBCUTANEOUS WEEKLY
Qty: 3 ML | Refills: 0 | Status: SHIPPED | OUTPATIENT
Start: 2023-11-08

## 2023-12-06 DIAGNOSIS — E66.01 CLASS 2 SEVERE OBESITY WITH SERIOUS COMORBIDITY AND BODY MASS INDEX (BMI) OF 36.0 TO 36.9 IN ADULT, UNSPECIFIED OBESITY TYPE: ICD-10-CM

## 2023-12-07 RX ORDER — SEMAGLUTIDE 1.7 MG/.75ML
1.7 INJECTION, SOLUTION SUBCUTANEOUS WEEKLY
Qty: 3 ML | Refills: 0 | Status: SHIPPED | OUTPATIENT
Start: 2023-12-07

## 2024-01-02 DIAGNOSIS — E66.01 CLASS 2 SEVERE OBESITY WITH SERIOUS COMORBIDITY AND BODY MASS INDEX (BMI) OF 36.0 TO 36.9 IN ADULT, UNSPECIFIED OBESITY TYPE: Primary | ICD-10-CM

## 2024-01-02 RX ORDER — SEMAGLUTIDE 2.4 MG/.75ML
2.4 INJECTION, SOLUTION SUBCUTANEOUS WEEKLY
Qty: 3 ML | Refills: 5 | Status: SHIPPED | OUTPATIENT
Start: 2024-01-02 | End: 2024-01-08

## 2024-01-08 DIAGNOSIS — E66.01 CLASS 2 SEVERE OBESITY WITH SERIOUS COMORBIDITY AND BODY MASS INDEX (BMI) OF 36.0 TO 36.9 IN ADULT, UNSPECIFIED OBESITY TYPE: Primary | ICD-10-CM

## 2024-01-12 ENCOUNTER — OFFICE VISIT (OUTPATIENT)
Dept: FAMILY MEDICINE CLINIC | Facility: CLINIC | Age: 59
End: 2024-01-12
Payer: COMMERCIAL

## 2024-01-12 VITALS
SYSTOLIC BLOOD PRESSURE: 112 MMHG | TEMPERATURE: 98 F | BODY MASS INDEX: 34.68 KG/M2 | HEART RATE: 87 BPM | OXYGEN SATURATION: 98 % | WEIGHT: 172 LBS | DIASTOLIC BLOOD PRESSURE: 62 MMHG | HEIGHT: 59 IN

## 2024-01-12 DIAGNOSIS — F51.01 PRIMARY INSOMNIA: ICD-10-CM

## 2024-01-12 DIAGNOSIS — I10 ESSENTIAL HYPERTENSION: Primary | ICD-10-CM

## 2024-01-12 DIAGNOSIS — E66.9 CLASS 1 OBESITY WITH SERIOUS COMORBIDITY AND BODY MASS INDEX (BMI) OF 34.0 TO 34.9 IN ADULT, UNSPECIFIED OBESITY TYPE: ICD-10-CM

## 2024-01-12 PROCEDURE — 99214 OFFICE O/P EST MOD 30 MIN: CPT | Performed by: FAMILY MEDICINE

## 2024-01-12 RX ORDER — AMITRIPTYLINE HYDROCHLORIDE 25 MG/1
25 TABLET, FILM COATED ORAL NIGHTLY
Qty: 90 TABLET | Refills: 1 | Status: SHIPPED | OUTPATIENT
Start: 2024-01-12

## 2024-01-12 RX ORDER — LISINOPRIL 10 MG/1
20 TABLET ORAL DAILY
Qty: 180 TABLET | Refills: 1 | Status: SHIPPED | OUTPATIENT
Start: 2024-01-12

## 2024-01-12 NOTE — PROGRESS NOTES
Subjective     Chief Complaint  Class 2 severe obesity with serious comorbidity and body ma    Subjective          Natalie Kan is a 58 y.o. female who presents today to [facility] for follow up.    HPI:   Hypertension  This is a recurrent problem. The current episode started more than 1 year ago. The problem has been gradually improving since onset. Pertinent negatives include no anxiety, blurred vision, chest pain, headaches, malaise/fatigue, orthopnea, palpitations, peripheral edema or shortness of breath. There are no associated agents to hypertension. There are no compliance problems.          Ms. Kan is a very pleasant 58-year-old female presents today to follow-up on chronic medical conditions with hypertension, migraine, insomnia.      Her starting weight was 196 lbs. Today, she weighed 178 lbs. - Down 18 lbs -  She is also now following with the gym and a  etc.  She reports that she has more energy and is feeling better overall.  She is currently prescribed Wegovy 1 mg weekly.  Her current weight today is, 172. By her home scales she is down 30 lbs. She reports that she is feeling great and exercising during the week. She has some nausea for about 4 days after taking the medication. It is manageable though, no vomiting etc.   Her goal weight is 150 lbs.     Hypertension -current blood pressure in office today is 112/62, currently prescribed lisinopril 20 mg / 1.25 mg daily.    The following portions of the patient's history were reviewed and updated as appropriate: allergies, current medications, past family history, past medical history, past social history, past surgical history and problem list.    Objective     Objective     Allergy:   Allergies   Allergen Reactions    Augmentin [Amoxicillin-Pot Clavulanate] GI Intolerance        Current Medications:   Current Outpatient Medications   Medication Sig Dispense Refill    amitriptyline (ELAVIL)  25 MG tablet Take 1 tablet by mouth Every Night. 90 tablet 1    calcium carbonate (OS-TORREY) 600 MG tablet Take 1 tablet by mouth Daily.      estradiol (Vagifem) 10 MCG tablet vaginal tablet Insert 1 tablet into the vagina 2 (Two) Times a Week. 8 tablet 10    fexofenadine (ALLEGRA) 180 MG tablet Take 1 tablet by mouth Daily. 90 tablet 1    fluocinonide (LIDEX) 0.05 % ointment APPLY TOPICALLY TO THE AFFECTED AREA TWICE DAILY FOR 2 WEEKS      fluticasone (FLONASE) 50 MCG/ACT nasal spray INSTILL 2 SPRAYS IN EACH NOSTRIL AS DIRECTED BY PROVIDER EVERY DAY 48 g 5    Multiple Vitamins-Minerals (MULTIVITAMIN ADULT PO) Take  by mouth.      Tirzepatide-Weight Management (ZEPBOUND) 2.5 MG/0.5ML solution auto-injector Inject 0.5 mL under the skin into the appropriate area as directed 1 (One) Time Per Week. 2 mL 0    Ubrelvy 100 MG tablet TAKE 1 TABLET BY MOUTH 1 TIME FOR 1 DOSE. MAY REPEAT IN 2 HOURS AS NEEDED      lisinopril (PRINIVIL,ZESTRIL) 10 MG tablet Take 2 tablets by mouth Daily. 180 tablet 1     No current facility-administered medications for this visit.       Past Medical History:  Past Medical History:   Diagnosis Date    Abnormal Pap smear of cervix     ASCUS    Contraception management     Nml PAP with HPV neg 2013    Dysfunctional uterine bleeding     History of prior pregnancies     A1    Hypertension     Malignant neoplasm     SQUAMOUS     Migraine     Non-healing skin lesion     SOB (shortness of breath)     Uterine leiomyoma        Past Surgical History:  Past Surgical History:   Procedure Laterality Date    APPENDECTOMY       SECTION       SECTION      MOHS SURGERY      micrographic surgery face right side - due to squamous cell carcinoma    WISDOM TOOTH EXTRACTION         Social History:  Social History     Socioeconomic History    Marital status:    Tobacco Use    Smoking status: Never     Passive exposure: Never    Smokeless tobacco: Never   Vaping Use    Vaping  "Use: Never used   Substance and Sexual Activity    Alcohol use: No    Drug use: No    Sexual activity: Yes     Partners: Male     Birth control/protection: Post-menopausal       Family History:  Family History   Problem Relation Age of Onset    Migraines Other     Skin cancer Other     Cancer Other     Hypertension Other     Migraines Mother     Cancer Mother         Malignant Neoplasm of left eye/ Renal cell cancer    Hypertension Sister     Migraines Brother     Cancer Father     Breast cancer Neg Hx     Ovarian cancer Neg Hx      Vital Signs:   /62   Pulse 87   Temp 98 °F (36.7 °C) (Infrared)   Ht 149.9 cm (59\")   Wt 78 kg (172 lb)   SpO2 98%   BMI 34.74 kg/m²      Physical Exam:  Physical Exam  Constitutional:       Appearance: Normal appearance.   Cardiovascular:      Rate and Rhythm: Normal rate.      Pulses: Normal pulses.   Pulmonary:      Effort: Pulmonary effort is normal.   Abdominal:      General: Abdomen is flat.   Skin:     General: Skin is warm.      Capillary Refill: Capillary refill takes less than 2 seconds.   Neurological:      General: No focal deficit present.      Mental Status: She is alert.               PHQ-9 Score  PHQ-9 Total Score: 0     Lab Review  No visits with results within 3 Month(s) from this visit.   Latest known visit with results is:   Office Visit on 02/28/2023   Component Date Value Ref Range Status    Rapid Strep A Screen 02/28/2023 Negative  Negative, VALID, INVALID, Not Performed Final    Internal Control 02/28/2023 Passed  Passed Final    Lot Number 02/28/2023 [id number]   Final    Expiration Date 02/28/2023 9/12/24   Final        Radiology Results        Assessment / Plan         Assessment and Plan   Diagnoses and all orders for this visit:    1. Essential hypertension (Primary)  Assessment & Plan:  Hypertension is improving with treatment.  Dietary sodium restriction.  Weight loss.  Regular aerobic exercise.  Continue current medications.  Medication changes " per orders.  Ambulatory blood pressure monitoring.  Blood pressure will be reassessed in 3 months.      2. Primary insomnia  Assessment & Plan:  Much improved with Elavil, refill sent     Orders:  -     amitriptyline (ELAVIL) 25 MG tablet; Take 1 tablet by mouth Every Night.  Dispense: 90 tablet; Refill: 1    3. Class 1 obesity with serious comorbidity and body mass index (BMI) of 34.0 to 34.9 in adult, unspecified obesity type  Assessment & Plan:  Patient's (Body mass index is 34.74 kg/m².) indicates that they are obese (BMI >30) with health conditions that include obstructive sleep apnea and hypertension . Weight is improving with treatment. BMI  is above average; BMI management plan is completed. We discussed low calorie, low carb based diet program, portion control, increasing exercise and pharmacologic options including Zepbound .     She has tolerated GLP-1 very well.  Pending approval for that bound.  She is following a weight management program through her insurance      Other orders  -     lisinopril (PRINIVIL,ZESTRIL) 10 MG tablet; Take 2 tablets by mouth Daily.  Dispense: 180 tablet; Refill: 1          Discussed possible differential diagnoses, testing, treatment, recommended non-pharmacological interventions, risks, warning signs to monitor for that would indicate need for follow-up in clinic or ER. If no improvement with these regimens or you have new or worsening symptoms follow-up. Patient verbalizes understanding and agreement with plan of care. Denies further needs or concerns.     Patient was given instructions and counseling regarding her condition and for health maintenance advised.        Health Maintenance  Health Maintenance:   Health Maintenance Due   Topic Date Due    ANNUAL PHYSICAL  01/12/2024          Meds ordered during this visit  New Medications Ordered This Visit   Medications    lisinopril (PRINIVIL,ZESTRIL) 10 MG tablet     Sig: Take 2 tablets by mouth Daily.     Dispense:  180  tablet     Refill:  1    amitriptyline (ELAVIL) 25 MG tablet     Sig: Take 1 tablet by mouth Every Night.     Dispense:  90 tablet     Refill:  1       Meds stopped during this visit:  Medications Discontinued During This Encounter   Medication Reason    lisinopril-hydrochlorothiazide (PRINZIDE,ZESTORETIC) 20-12.5 MG per tablet     amitriptyline (ELAVIL) 25 MG tablet Reorder          Visit Diagnoses    ICD-10-CM ICD-9-CM   1. Essential hypertension  I10 401.9   2. Primary insomnia  F51.01 307.42   3. Class 1 obesity with serious comorbidity and body mass index (BMI) of 34.0 to 34.9 in adult, unspecified obesity type  E66.9 278.00    Z68.34 V85.34         Patient was given instructions and counseling regarding her condition or for health maintenance advice. Please see specific information pulled into the AVS if appropriate.     Follow Up   No follow-ups on file.      This document has been electronically signed by Beth Dill DO   January 12, 2024 19:38 EST    Dictated Utilizing Dragon Dictation: Part of this note may be an electronic transcription/translation of spoken language to printed text using the Dragon Dictation System.    Beth Dill D.O.  [facility] [place]   Answers submitted by the patient for this visit:  Primary Reason for Visit (Submitted on 1/11/2024)  What is the primary reason for your visit?: High Blood Pressure

## 2024-01-13 NOTE — ASSESSMENT & PLAN NOTE
Patient's (Body mass index is 34.74 kg/m².) indicates that they are obese (BMI >30) with health conditions that include obstructive sleep apnea and hypertension . Weight is improving with treatment. BMI  is above average; BMI management plan is completed. We discussed low calorie, low carb based diet program, portion control, increasing exercise and pharmacologic options including Zepbound .     She has tolerated GLP-1 very well.  Pending approval for that bound.  She is following a weight management program through her insurance

## 2024-01-13 NOTE — ASSESSMENT & PLAN NOTE
Hypertension is improving with treatment.  Dietary sodium restriction.  Weight loss.  Regular aerobic exercise.  Continue current medications.  Medication changes per orders.  Ambulatory blood pressure monitoring.  Blood pressure will be reassessed in 3 months.

## 2024-01-18 RX ORDER — LISINOPRIL 20 MG/1
20 TABLET ORAL DAILY
Qty: 90 TABLET | Refills: 0 | Status: SHIPPED | OUTPATIENT
Start: 2024-01-18

## 2024-01-19 ENCOUNTER — PRIOR AUTHORIZATION (OUTPATIENT)
Dept: FAMILY MEDICINE CLINIC | Facility: CLINIC | Age: 59
End: 2024-01-19
Payer: COMMERCIAL

## 2024-01-19 NOTE — TELEPHONE ENCOUNTER
1/19/24    PA for Zepbound sent to plan and is pending      NISH MOODY (Key: [id number])  Drug  Zepbound 2.5MG/0.5ML pen-injectors

## 2024-01-25 ENCOUNTER — OFFICE VISIT (OUTPATIENT)
Dept: FAMILY MEDICINE CLINIC | Facility: CLINIC | Age: 59
End: 2024-01-25
Payer: COMMERCIAL

## 2024-01-25 ENCOUNTER — LAB (OUTPATIENT)
Dept: LAB | Facility: HOSPITAL | Age: 59
End: 2024-01-25
Payer: COMMERCIAL

## 2024-01-25 VITALS
HEART RATE: 78 BPM | DIASTOLIC BLOOD PRESSURE: 84 MMHG | BODY MASS INDEX: 35.04 KG/M2 | WEIGHT: 173.8 LBS | TEMPERATURE: 97.7 F | HEIGHT: 59 IN | SYSTOLIC BLOOD PRESSURE: 132 MMHG

## 2024-01-25 DIAGNOSIS — R21 RASH: ICD-10-CM

## 2024-01-25 DIAGNOSIS — T14.8XXA BRUISING: ICD-10-CM

## 2024-01-25 DIAGNOSIS — E55.9 VITAMIN D DEFICIENCY: ICD-10-CM

## 2024-01-25 DIAGNOSIS — R21 RASH: Primary | ICD-10-CM

## 2024-01-25 PROCEDURE — 82607 VITAMIN B-12: CPT

## 2024-01-25 PROCEDURE — 80050 GENERAL HEALTH PANEL: CPT

## 2024-01-25 PROCEDURE — 82306 VITAMIN D 25 HYDROXY: CPT

## 2024-01-25 RX ORDER — HYDROXYZINE HYDROCHLORIDE 25 MG/1
25 TABLET, FILM COATED ORAL 3 TIMES DAILY PRN
Qty: 90 TABLET | Refills: 0 | Status: SHIPPED | OUTPATIENT
Start: 2024-01-25

## 2024-01-25 NOTE — PROGRESS NOTES
Subjective     Chief Complaint  Bleeding/Bruising (Bruising and itching on inside on thighs. )    Subjective          Natalie Kan is a 58 y.o. female who presents today to [facility] for initial evaluation .    HPI:   History of Present Illness    Ms. Kan is a pleasant 58 year old female who presents today to discuss a rash and bruising on her inner thighs.     She has itching etc as well. She has not changed detergents, lotions etc,nor foods/medicines.     She has no swelling or inflammation of the lower extremities.     The following portions of the patient's history were reviewed and updated as appropriate: allergies, current medications, past family history, past medical history, past social history, past surgical history and problem list.    Objective     Objective     Allergy:   Allergies   Allergen Reactions    Augmentin [Amoxicillin-Pot Clavulanate] GI Intolerance        Current Medications:   Current Outpatient Medications   Medication Sig Dispense Refill    amitriptyline (ELAVIL) 25 MG tablet Take 1 tablet by mouth Every Night. 90 tablet 1    calcium carbonate (OS-TORREY) 600 MG tablet Take 1 tablet by mouth Daily.      estradiol (Vagifem) 10 MCG tablet vaginal tablet Insert 1 tablet into the vagina 2 (Two) Times a Week. 8 tablet 10    fexofenadine (ALLEGRA) 180 MG tablet Take 1 tablet by mouth Daily. 90 tablet 1    fluocinonide (LIDEX) 0.05 % ointment APPLY TOPICALLY TO THE AFFECTED AREA TWICE DAILY FOR 2 WEEKS      fluticasone (FLONASE) 50 MCG/ACT nasal spray INSTILL 2 SPRAYS IN EACH NOSTRIL AS DIRECTED BY PROVIDER EVERY DAY 48 g 5    lisinopril (PRINIVIL,ZESTRIL) 20 MG tablet Take 1 tablet by mouth Daily. 90 tablet 0    Multiple Vitamins-Minerals (MULTIVITAMIN ADULT PO) Take  by mouth.      Tirzepatide-Weight Management (ZEPBOUND) 2.5 MG/0.5ML solution auto-injector Inject 0.5 mL under the skin into the appropriate area as directed 1 (One) Time Per Week. 2  "mL 0    Ubrelvy 100 MG tablet TAKE 1 TABLET BY MOUTH 1 TIME FOR 1 DOSE. MAY REPEAT IN 2 HOURS AS NEEDED      hydrOXYzine (ATARAX) 25 MG tablet Take 1 tablet by mouth 3 (Three) Times a Day As Needed for Itching. 90 tablet 0     No current facility-administered medications for this visit.       Past Medical History:  Past Medical History:   Diagnosis Date    Abnormal Pap smear of cervix     ASCUS    Contraception management     Nml PAP with HPV neg 2013    Dysfunctional uterine bleeding     History of prior pregnancies     A1    Hypertension     Malignant neoplasm     SQUAMOUS     Migraine     Non-healing skin lesion     SOB (shortness of breath)     Uterine leiomyoma        Past Surgical History:  Past Surgical History:   Procedure Laterality Date    APPENDECTOMY       SECTION       SECTION      MOHS SURGERY      micrographic surgery face right side - due to squamous cell carcinoma    WISDOM TOOTH EXTRACTION         Social History:  Social History     Socioeconomic History    Marital status:    Tobacco Use    Smoking status: Never     Passive exposure: Never    Smokeless tobacco: Never   Vaping Use    Vaping Use: Never used   Substance and Sexual Activity    Alcohol use: No    Drug use: No    Sexual activity: Yes     Partners: Male     Birth control/protection: Post-menopausal       Family History:  Family History   Problem Relation Age of Onset    Migraines Other     Skin cancer Other     Cancer Other     Hypertension Other     Migraines Mother     Cancer Mother         Malignant Neoplasm of left eye/ Renal cell cancer    Hypertension Sister     Migraines Brother     Cancer Father     Breast cancer Neg Hx     Ovarian cancer Neg Hx          Vital Signs:   /84   Pulse 78   Temp 97.7 °F (36.5 °C) (Infrared)   Ht 149.9 cm (59.02\")   Wt 78.8 kg (173 lb 12.8 oz)   BMI 35.08 kg/m²      Physical Exam:  Physical Exam  Constitutional:       Appearance: She is not " ill-appearing.   Eyes:      Pupils: Pupils are equal, round, and reactive to light.   Cardiovascular:      Rate and Rhythm: Normal rate.   Pulmonary:      Effort: Pulmonary effort is normal.   Skin:     Capillary Refill: Capillary refill takes less than 2 seconds.      Comments: Slight skin irritation bilateral medial thighs with intermittent bruising    Neurological:      General: No focal deficit present.      Mental Status: She is alert. Mental status is at baseline.               PHQ-9 Score  PHQ-9 Total Score:       Lab Review  No visits with results within 3 Month(s) from this visit.   Latest known visit with results is:   Office Visit on 02/28/2023   Component Date Value Ref Range Status    Rapid Strep A Screen 02/28/2023 Negative  Negative, VALID, INVALID, Not Performed Final    Internal Control 02/28/2023 Passed  Passed Final    Lot Number 02/28/2023 [id number]   Final    Expiration Date 02/28/2023 9/12/24   Final        Radiology Results        Assessment / Plan         Assessment and Plan   Diagnoses and all orders for this visit:    1. Rash (Primary)  -     Comprehensive Metabolic Panel; Future  -     CBC & Differential; Future  -     TSH Rfx On Abnormal To Free T4; Future  -     Vitamin B12; Future  -     Vitamin D,25-Hydroxy; Future  -     hydrOXYzine (ATARAX) 25 MG tablet; Take 1 tablet by mouth 3 (Three) Times a Day As Needed for Itching.  Dispense: 90 tablet; Refill: 0    2. Bruising  -     Comprehensive Metabolic Panel; Future  -     CBC & Differential; Future  -     TSH Rfx On Abnormal To Free T4; Future  -     Vitamin B12; Future  -     Vitamin D,25-Hydroxy; Future    3. Vitamin D deficiency  -     Vitamin B12; Future  -     Vitamin D,25-Hydroxy; Future      - unclear etiology at this point. Rx vistaril for itching   - baseline labs to determine if organic cause   - encouraged follow up with Dermatology     Discussed possible differential diagnoses, testing, treatment, recommended  non-pharmacological interventions, risks, warning signs to monitor for that would indicate need for follow-up in clinic or ER. If no improvement with these regimens or you have new or worsening symptoms follow-up. Patient verbalizes understanding and agreement with plan of care. Denies further needs or concerns.     Patient was given instructions and counseling regarding her condition and for health maintenance advised.          Health Maintenance  Health Maintenance:   Health Maintenance Due   Topic Date Due    ANNUAL PHYSICAL  01/12/2024        Meds ordered during this visit  New Medications Ordered This Visit   Medications    hydrOXYzine (ATARAX) 25 MG tablet     Sig: Take 1 tablet by mouth 3 (Three) Times a Day As Needed for Itching.     Dispense:  90 tablet     Refill:  0       Meds stopped during this visit:  There are no discontinued medications.     Visit Diagnoses    ICD-10-CM ICD-9-CM   1. Rash  R21 782.1   2. Bruising  T14.8XXA 924.9   3. Vitamin D deficiency  E55.9 268.9       Patient was given instructions and counseling regarding her condition or for health maintenance advice. Please see specific information pulled into the AVS if appropriate.     Follow Up   Return for Next scheduled follow up.          This document has been electronically signed by Beth Dill DO   January 25, 2024 15:04 EST    Dictated Utilizing Dragon Dictation: Part of this note may be an electronic transcription/translation of spoken language to printed text using the Dragon Dictation System.    Beth Dill D.O.  [facility] [place]

## 2024-01-26 LAB
25(OH)D3 SERPL-MCNC: 46.9 NG/ML (ref 30–100)
ALBUMIN SERPL-MCNC: 4.2 G/DL (ref 3.5–5.2)
ALBUMIN/GLOB SERPL: 1.7 G/DL
ALP SERPL-CCNC: 71 U/L (ref 39–117)
ALT SERPL W P-5'-P-CCNC: 34 U/L (ref 1–33)
ANION GAP SERPL CALCULATED.3IONS-SCNC: 9.9 MMOL/L (ref 5–15)
AST SERPL-CCNC: 30 U/L (ref 1–32)
BASOPHILS # BLD AUTO: 0.04 10*3/MM3 (ref 0–0.2)
BASOPHILS NFR BLD AUTO: 0.5 % (ref 0–1.5)
BILIRUB SERPL-MCNC: 0.6 MG/DL (ref 0–1.2)
BUN SERPL-MCNC: 12 MG/DL (ref 6–20)
BUN/CREAT SERPL: 15.6 (ref 7–25)
CALCIUM SPEC-SCNC: 9.8 MG/DL (ref 8.6–10.5)
CHLORIDE SERPL-SCNC: 104 MMOL/L (ref 98–107)
CO2 SERPL-SCNC: 28.1 MMOL/L (ref 22–29)
CREAT SERPL-MCNC: 0.77 MG/DL (ref 0.57–1)
DEPRECATED RDW RBC AUTO: 41.3 FL (ref 37–54)
EGFRCR SERPLBLD CKD-EPI 2021: 89.5 ML/MIN/1.73
EOSINOPHIL # BLD AUTO: 0.14 10*3/MM3 (ref 0–0.4)
EOSINOPHIL NFR BLD AUTO: 1.8 % (ref 0.3–6.2)
ERYTHROCYTE [DISTWIDTH] IN BLOOD BY AUTOMATED COUNT: 12.8 % (ref 12.3–15.4)
GLOBULIN UR ELPH-MCNC: 2.5 GM/DL
GLUCOSE SERPL-MCNC: 65 MG/DL (ref 65–99)
HCT VFR BLD AUTO: 44.2 % (ref 34–46.6)
HGB BLD-MCNC: 14.5 G/DL (ref 12–15.9)
IMM GRANULOCYTES # BLD AUTO: 0.02 10*3/MM3 (ref 0–0.05)
IMM GRANULOCYTES NFR BLD AUTO: 0.3 % (ref 0–0.5)
LYMPHOCYTES # BLD AUTO: 2.52 10*3/MM3 (ref 0.7–3.1)
LYMPHOCYTES NFR BLD AUTO: 32.1 % (ref 19.6–45.3)
MCH RBC QN AUTO: 29 PG (ref 26.6–33)
MCHC RBC AUTO-ENTMCNC: 32.8 G/DL (ref 31.5–35.7)
MCV RBC AUTO: 88.4 FL (ref 79–97)
MONOCYTES # BLD AUTO: 0.88 10*3/MM3 (ref 0.1–0.9)
MONOCYTES NFR BLD AUTO: 11.2 % (ref 5–12)
NEUTROPHILS NFR BLD AUTO: 4.24 10*3/MM3 (ref 1.7–7)
NEUTROPHILS NFR BLD AUTO: 54.1 % (ref 42.7–76)
NRBC BLD AUTO-RTO: 0 /100 WBC (ref 0–0.2)
PLATELET # BLD AUTO: 289 10*3/MM3 (ref 140–450)
PMV BLD AUTO: 9.3 FL (ref 6–12)
POTASSIUM SERPL-SCNC: 3.8 MMOL/L (ref 3.5–5.2)
PROT SERPL-MCNC: 6.7 G/DL (ref 6–8.5)
RBC # BLD AUTO: 5 10*6/MM3 (ref 3.77–5.28)
SODIUM SERPL-SCNC: 142 MMOL/L (ref 136–145)
TSH SERPL DL<=0.05 MIU/L-ACNC: 1.19 UIU/ML (ref 0.27–4.2)
VIT B12 BLD-MCNC: 1041 PG/ML (ref 211–946)
WBC NRBC COR # BLD AUTO: 7.84 10*3/MM3 (ref 3.4–10.8)

## 2024-02-04 DIAGNOSIS — I10 ESSENTIAL HYPERTENSION: ICD-10-CM

## 2024-02-05 RX ORDER — LISINOPRIL AND HYDROCHLOROTHIAZIDE 20; 12.5 MG/1; MG/1
1 TABLET ORAL DAILY
Qty: 90 TABLET | Refills: 0 | OUTPATIENT
Start: 2024-02-05

## 2024-02-05 NOTE — TELEPHONE ENCOUNTER
Rx Refill Note  Requested Prescriptions     Pending Prescriptions Disp Refills    lisinopril-hydrochlorothiazide (PRINZIDE,ZESTORETIC) 20-12.5 MG per tablet [Pharmacy Med Name: LISINOPRIL-HCTZ 20/12.5MG TABLETS] 90 tablet 0     Sig: TAKE 1 TABLET BY MOUTH DAILY      Last office visit with prescribing clinician: 1/25/2024   Last telemedicine visit with prescribing clinician: Visit date not found   Next office visit with prescribing clinician: 4/12/2024                         Would you like a call back once the refill request has been completed: [] Yes [] No    If the office needs to give you a call back, can they leave a voicemail: [] Yes [] No    Jana Perez MA  02/05/24, 08:15 EST

## 2024-02-12 RX ORDER — SEMAGLUTIDE 2.4 MG/.75ML
2.4 INJECTION, SOLUTION SUBCUTANEOUS WEEKLY
Qty: 3 ML | Refills: 2 | Status: SHIPPED | OUTPATIENT
Start: 2024-02-12

## 2024-02-24 DIAGNOSIS — F51.01 PRIMARY INSOMNIA: ICD-10-CM

## 2024-02-26 RX ORDER — AMITRIPTYLINE HYDROCHLORIDE 25 MG/1
25 TABLET, FILM COATED ORAL NIGHTLY
Qty: 90 TABLET | Refills: 1 | Status: SHIPPED | OUTPATIENT
Start: 2024-02-26

## 2024-02-28 ENCOUNTER — OFFICE VISIT (OUTPATIENT)
Dept: OBSTETRICS AND GYNECOLOGY | Facility: CLINIC | Age: 59
End: 2024-02-28
Payer: COMMERCIAL

## 2024-02-28 VITALS
SYSTOLIC BLOOD PRESSURE: 140 MMHG | DIASTOLIC BLOOD PRESSURE: 88 MMHG | WEIGHT: 170.8 LBS | HEIGHT: 59 IN | BODY MASS INDEX: 34.43 KG/M2

## 2024-02-28 DIAGNOSIS — N95.2 ATROPHY OF VAGINA: ICD-10-CM

## 2024-02-28 DIAGNOSIS — Z01.419 ENCOUNTER FOR GYNECOLOGICAL EXAMINATION WITHOUT ABNORMAL FINDING: Primary | ICD-10-CM

## 2024-02-28 RX ORDER — ESTRADIOL 10 UG/1
1 INSERT VAGINAL 2 TIMES WEEKLY
Qty: 8 TABLET | Refills: 12 | Status: SHIPPED | OUTPATIENT
Start: 2024-02-29

## 2024-02-28 NOTE — PROGRESS NOTES
Chief Complaint  Natalie Kan is a 59 y.o.  female presenting for Annual Exam and Med Refill (Estradiol vaginal tablets 10 mcg (#8 with refills).   pharmacy.  )    History of Present Illness  Natalie is a very pleasant 58yo woman, , who is here for annual gyn exam.  Her surgical history includes, in part,  section deliveries x 2.  She uses vaginal estradiol tablets for atrophy and feels like it is helpful.  Hypertension is usually well-controlled  She has lost about 30 pounds on current meds!  She has osteopenia (2023) with normal FRAX scores, and is intentional about calcium, vitamin D and weightbearing exercise.      Last Pap , negative   last mammogram  negative      The following portions of the patient's history were reviewed and updated as appropriate: allergies, current medications, past family history, past medical history, past social history, past surgical history, and problem list.    Allergies   Allergen Reactions    Augmentin [Amoxicillin-Pot Clavulanate] GI Intolerance         Current Outpatient Medications:     [START ON 2024] estradiol (Vagifem) 10 MCG tablet vaginal tablet, Insert 1 tablet into the vagina 2 (Two) Times a Week., Disp: 8 tablet, Rfl: 12    amitriptyline (ELAVIL) 25 MG tablet, Take 1 tablet by mouth Every Night., Disp: 90 tablet, Rfl: 1    calcium carbonate (OS-TORREY) 600 MG tablet, Take 1 tablet by mouth Daily., Disp: , Rfl:     fexofenadine (ALLEGRA) 180 MG tablet, Take 1 tablet by mouth Daily., Disp: 90 tablet, Rfl: 1    fluocinonide (LIDEX) 0.05 % ointment, APPLY TOPICALLY TO THE AFFECTED AREA TWICE DAILY FOR 2 WEEKS, Disp: , Rfl:     fluocinonide (LIDEX) 0.05 % ointment, Apply 1 application topically to the affected area(s) as directed 2 times per day for up to 2 weeks at a time; take a 2 week before repeating use, Disp: 60 g, Rfl: 2    fluticasone (FLONASE) 50 MCG/ACT nasal spray, INSTILL 2 SPRAYS IN EACH NOSTRIL AS DIRECTED BY  "PROVIDER EVERY DAY, Disp: 48 g, Rfl: 5    hydrOXYzine (ATARAX) 25 MG tablet, Take 1 tablet by mouth 3 (Three) Times a Day As Needed for Itching., Disp: 90 tablet, Rfl: 0    lisinopril (PRINIVIL,ZESTRIL) 20 MG tablet, Take 1 tablet by mouth Daily., Disp: 90 tablet, Rfl: 0    Multiple Vitamins-Minerals (MULTIVITAMIN ADULT PO), Take  by mouth., Disp: , Rfl:     Semaglutide-Weight Management (Wegovy) 2.4 MG/0.75ML solution auto-injector, Inject 2.4 mg under the skin into the appropriate area as directed 1 (One) Time Per Week., Disp: 3 mL, Rfl: 2    Ubrelvy 100 MG tablet, TAKE 1 TABLET BY MOUTH 1 TIME FOR 1 DOSE. MAY REPEAT IN 2 HOURS AS NEEDED, Disp: , Rfl:     Past Medical History:   Diagnosis Date    Abnormal Pap smear of cervix     ASCUS    Contraception management     Nml PAP with HPV neg 2013    Dysfunctional uterine bleeding     Eczema     History of prior pregnancies     A1    Hypertension     Malignant neoplasm     SQUAMOUS     Migraine     Non-healing skin lesion     SOB (shortness of breath)     Uterine leiomyoma         Past Surgical History:   Procedure Laterality Date    APPENDECTOMY       SECTION       SECTION      MOHS SURGERY      micrographic surgery face right side - due to squamous cell carcinoma    WISDOM TOOTH EXTRACTION         Objective  /88   Ht 149.9 cm (59\")   Wt 77.5 kg (170 lb 12.8 oz)   Breastfeeding No   BMI 34.50 kg/m²     Physical Exam  Vitals and nursing note reviewed. Exam conducted with a chaperone present.   Constitutional:       General: She is not in acute distress.     Appearance: Normal appearance. She is not ill-appearing.   HENT:      Head: Normocephalic.   Neck:      Thyroid: No thyroid mass or thyromegaly.   Cardiovascular:      Rate and Rhythm: Normal rate and regular rhythm.      Heart sounds: Normal heart sounds. No murmur heard.  Pulmonary:      Effort: Pulmonary effort is normal. No respiratory distress.      Breath sounds: " Normal breath sounds.   Chest:   Breasts:     Right: No inverted nipple, mass or nipple discharge.      Left: No inverted nipple, mass or nipple discharge.   Abdominal:      Palpations: Abdomen is soft. There is no mass.      Tenderness: There is no abdominal tenderness.   Genitourinary:     General: Normal vulva.      Labia:         Right: No rash, tenderness or lesion.         Left: No rash, tenderness or lesion.       Vagina: Normal. No erythema.      Cervix: No discharge, lesion or erythema.      Uterus: Not enlarged and not tender.       Adnexa:         Right: No mass or tenderness.          Left: No mass or tenderness.        Comments: Anus appears wnl.  No rectal exam performed.  Lymphadenopathy:      Upper Body:      Right upper body: No supraclavicular or axillary adenopathy.      Left upper body: No supraclavicular or axillary adenopathy.   Skin:     General: Skin is warm and dry.   Neurological:      Mental Status: She is alert and oriented to person, place, and time.   Psychiatric:         Mood and Affect: Mood normal.         Behavior: Behavior normal.         Assessment/Plan   Diagnoses and all orders for this visit:    1. Encounter for gynecological examination without abnormal finding (Primary)    2. Atrophy of vagina    Other orders  -     estradiol (Vagifem) 10 MCG tablet vaginal tablet; Insert 1 tablet into the vagina 2 (Two) Times a Week.  Dispense: 8 tablet; Refill: 12        Procedures    40 to 64: Counseling/Anticipatory Guidance Discussed: nutrition, physical activity, screenings, and self-breast exam    Return in about 1 year (around 2/28/2025) for Annual physical, Phone # for setting up mammo.    Susan Welch, LISA  02/28/2024

## 2024-03-10 ENCOUNTER — PATIENT MESSAGE (OUTPATIENT)
Dept: FAMILY MEDICINE CLINIC | Facility: CLINIC | Age: 59
End: 2024-03-10
Payer: COMMERCIAL

## 2024-03-11 RX ORDER — SEMAGLUTIDE 1.7 MG/.75ML
1.7 INJECTION, SOLUTION SUBCUTANEOUS WEEKLY
Qty: 3 ML | Refills: 2 | Status: SHIPPED | OUTPATIENT
Start: 2024-03-11

## 2024-04-08 DIAGNOSIS — J30.2 SEASONAL ALLERGIES: ICD-10-CM

## 2024-04-09 RX ORDER — FEXOFENADINE HCL 180 MG/1
180 TABLET ORAL DAILY
Qty: 90 TABLET | Refills: 1 | Status: SHIPPED | OUTPATIENT
Start: 2024-04-09

## 2024-04-12 ENCOUNTER — OFFICE VISIT (OUTPATIENT)
Dept: FAMILY MEDICINE CLINIC | Facility: CLINIC | Age: 59
End: 2024-04-12
Payer: COMMERCIAL

## 2024-04-12 VITALS
WEIGHT: 167.7 LBS | HEART RATE: 63 BPM | HEIGHT: 59 IN | BODY MASS INDEX: 33.81 KG/M2 | SYSTOLIC BLOOD PRESSURE: 140 MMHG | DIASTOLIC BLOOD PRESSURE: 80 MMHG | OXYGEN SATURATION: 99 % | TEMPERATURE: 97.7 F

## 2024-04-12 DIAGNOSIS — E66.9 CLASS 1 OBESITY WITH SERIOUS COMORBIDITY AND BODY MASS INDEX (BMI) OF 33.0 TO 33.9 IN ADULT, UNSPECIFIED OBESITY TYPE: Primary | ICD-10-CM

## 2024-04-12 DIAGNOSIS — I10 ESSENTIAL HYPERTENSION: ICD-10-CM

## 2024-04-12 PROBLEM — H66.92: Status: RESOLVED | Noted: 2023-06-07 | Resolved: 2024-04-12

## 2024-04-12 PROBLEM — R21 RASH AND NONSPECIFIC SKIN ERUPTION: Status: RESOLVED | Noted: 2023-04-11 | Resolved: 2024-04-12

## 2024-04-12 PROBLEM — B96.89: Status: RESOLVED | Noted: 2023-06-07 | Resolved: 2024-04-12

## 2024-04-12 PROBLEM — H92.02 LEFT EAR PAIN: Status: RESOLVED | Noted: 2023-07-11 | Resolved: 2024-04-12

## 2024-04-12 PROCEDURE — 99214 OFFICE O/P EST MOD 30 MIN: CPT | Performed by: FAMILY MEDICINE

## 2024-04-12 RX ORDER — LISINOPRIL 20 MG/1
20 TABLET ORAL DAILY
Qty: 90 TABLET | Refills: 1 | Status: SHIPPED | OUTPATIENT
Start: 2024-04-12

## 2024-04-12 RX ORDER — MAGNESIUM GLUCONATE 27 MG(500)
27 TABLET ORAL 2 TIMES DAILY
COMMUNITY

## 2024-04-12 RX ORDER — SEMAGLUTIDE 2.4 MG/.75ML
2.4 INJECTION, SOLUTION SUBCUTANEOUS WEEKLY
Qty: 3 ML | Refills: 2 | Status: SHIPPED | OUTPATIENT
Start: 2024-04-12

## 2024-04-12 NOTE — ASSESSMENT & PLAN NOTE
Patient's (Body mass index is 33.85 kg/m².) indicates that they are obese (BMI >30) with health conditions that include obstructive sleep apnea and hypertension . Weight is improving with treatment. BMI  is above average; BMI management plan is completed. We discussed low calorie, low carb based diet program, portion control, increasing exercise and pharmacologic options including Continuing Wegovy at max dose  .     She has tolerated GLP-1 very well.    She is also following a weight management program through her insurance

## 2024-05-21 RX ORDER — MAGNESIUM GLUCONATE 27 MG(500)
500 TABLET ORAL 2 TIMES DAILY
Qty: 60 TABLET | Refills: 2 | Status: SHIPPED | OUTPATIENT
Start: 2024-05-21

## 2024-06-18 RX ORDER — MAGNESIUM GLUCONATE 27 MG(500)
500 TABLET ORAL 2 TIMES DAILY
Qty: 60 TABLET | Refills: 2 | Status: SHIPPED | OUTPATIENT
Start: 2024-06-18

## 2024-07-12 ENCOUNTER — OFFICE VISIT (OUTPATIENT)
Dept: FAMILY MEDICINE CLINIC | Facility: CLINIC | Age: 59
End: 2024-07-12
Payer: COMMERCIAL

## 2024-07-12 VITALS
SYSTOLIC BLOOD PRESSURE: 120 MMHG | OXYGEN SATURATION: 97 % | HEIGHT: 59 IN | TEMPERATURE: 98.2 F | DIASTOLIC BLOOD PRESSURE: 80 MMHG | HEART RATE: 97 BPM | BODY MASS INDEX: 32.5 KG/M2 | WEIGHT: 161.2 LBS

## 2024-07-12 DIAGNOSIS — I10 ESSENTIAL HYPERTENSION: Primary | ICD-10-CM

## 2024-07-12 DIAGNOSIS — E66.9 CLASS 1 OBESITY WITH SERIOUS COMORBIDITY AND BODY MASS INDEX (BMI) OF 33.0 TO 33.9 IN ADULT, UNSPECIFIED OBESITY TYPE: ICD-10-CM

## 2024-07-12 DIAGNOSIS — Z12.31 ENCOUNTER FOR SCREENING MAMMOGRAM FOR MALIGNANT NEOPLASM OF BREAST: ICD-10-CM

## 2024-07-12 DIAGNOSIS — F51.01 PRIMARY INSOMNIA: ICD-10-CM

## 2024-07-12 DIAGNOSIS — J30.2 SEASONAL ALLERGIES: ICD-10-CM

## 2024-07-12 DIAGNOSIS — Z76.0 MEDICATION REFILL: ICD-10-CM

## 2024-07-12 PROCEDURE — 99214 OFFICE O/P EST MOD 30 MIN: CPT | Performed by: FAMILY MEDICINE

## 2024-07-12 RX ORDER — FEXOFENADINE HCL 180 MG/1
180 TABLET ORAL DAILY
Qty: 90 TABLET | Refills: 1 | Status: SHIPPED | OUTPATIENT
Start: 2024-07-12

## 2024-07-12 RX ORDER — MAGNESIUM GLUCONATE 27 MG(500)
500 TABLET ORAL 2 TIMES DAILY
Qty: 60 TABLET | Refills: 2 | Status: SHIPPED | OUTPATIENT
Start: 2024-07-12

## 2024-07-12 RX ORDER — LISINOPRIL 20 MG/1
20 TABLET ORAL DAILY
Qty: 90 TABLET | Refills: 1 | Status: SHIPPED | OUTPATIENT
Start: 2024-07-12

## 2024-07-12 RX ORDER — AMITRIPTYLINE HYDROCHLORIDE 25 MG/1
25 TABLET, FILM COATED ORAL NIGHTLY
Qty: 90 TABLET | Refills: 1 | Status: SHIPPED | OUTPATIENT
Start: 2024-07-12

## 2024-07-12 RX ORDER — SEMAGLUTIDE 2.4 MG/.75ML
2.4 INJECTION, SOLUTION SUBCUTANEOUS WEEKLY
Qty: 3 ML | Refills: 2 | Status: SHIPPED | OUTPATIENT
Start: 2024-07-12

## 2024-07-12 RX ORDER — FLUTICASONE PROPIONATE 50 MCG
1 SPRAY, SUSPENSION (ML) NASAL DAILY
Qty: 48 G | Refills: 5 | Status: SHIPPED | OUTPATIENT
Start: 2024-07-12

## 2024-07-12 NOTE — ASSESSMENT & PLAN NOTE
Patient's (Body mass index is 32.54 kg/m².) indicates that they are obese (BMI >30) with health conditions that include obstructive sleep apnea and hypertension . Weight is improving with treatment. BMI  is above average; BMI management plan is completed. We discussed low calorie, low carb based diet program, portion control, increasing exercise and pharmacologic options including Continuing Wegovy at max dose  .     She has tolerated GLP-1 very well.  - Goal weight is 150 lbs

## 2024-07-12 NOTE — ASSESSMENT & PLAN NOTE
Hypertension is stable and controlled  Continue current treatment regimen.  Dietary sodium restriction.  Weight loss.  Ambulatory blood pressure monitoring.  Blood pressure will be reassessed in 3 months.

## 2024-07-12 NOTE — PROGRESS NOTES
Subjective     Chief Complaint  Hypertension and Obesity    Subjective          Natalie Kan is a 59 y.o. female who presents today to [facility] for follow up.    HPI:   Hypertension  This is a recurrent problem. The current episode started more than 1 year ago. The problem has been gradually improving since onset. Pertinent negatives include no anxiety, malaise/fatigue, orthopnea, palpitations, peripheral edema or shortness of breath. There are no associated agents to hypertension. There are no compliance problems.    Obesity          Ms. Kan is a very pleasant 58-year-old female presents today to follow-up on chronic medical conditions with hypertension, migraine, insomnia.      Her starting weight was 196 lbs. Today, she weighed 161 lbs. - Down 35 lbs -  She is also now following with the gym and a  etc.  She reports that she has more energy and is feeling better overall.  She is currently prescribed Wegovy 2.4 mg weekly. She reports that she is feeling great and exercising during the week.  By her home scale, she has been 164 lbs.   Her goal weight is 150 lbs.     Hypertension -current blood pressure in office today is 120/80, currently prescribed lisinopril 20 mg daily -HCTZ was stopped at last visit    The following portions of the patient's history were reviewed and updated as appropriate: allergies, current medications, past family history, past medical history, past social history, past surgical history and problem list.    Objective     Objective     Allergy:   Allergies   Allergen Reactions    Augmentin [Amoxicillin-Pot Clavulanate] GI Intolerance        Current Medications:   Current Outpatient Medications   Medication Sig Dispense Refill    amitriptyline (ELAVIL) 25 MG tablet Take 1 tablet by mouth Every Night. 90 tablet 1    calcium carbonate (OS-TORREY) 600 MG tablet Take 1 tablet by mouth Daily.      estradiol (Vagifem) 10 MCG tablet  vaginal tablet Insert 1 tablet into the vagina 2 (Two) Times a Week. 8 tablet 12    fexofenadine (ALLEGRA) 180 MG tablet Take 1 tablet by mouth Daily. 90 tablet 1    fluticasone (FLONASE) 50 MCG/ACT nasal spray 1 spray into the nostril(s) as directed by provider Daily. 48 g 5    hydrOXYzine (ATARAX) 25 MG tablet Take 1 tablet by mouth 3 (Three) Times a Day As Needed for Itching. 90 tablet 0    lisinopril (PRINIVIL,ZESTRIL) 20 MG tablet Take 1 tablet by mouth Daily. 90 tablet 1    magnesium gluconate (MAGONATE) 500 MG tablet Take 1 tablet by mouth 2 (Two) Times a Day. 60 tablet 2    Multiple Vitamins-Minerals (MULTIVITAMIN ADULT PO) Take  by mouth.      Semaglutide-Weight Management (Wegovy) 2.4 MG/0.75ML solution auto-injector Inject 2.4 mg under the skin into the appropriate area as directed 1 (One) Time Per Week. 3 mL 2    Ubrelvy 100 MG tablet TAKE 1 TABLET BY MOUTH 1 TIME FOR 1 DOSE. MAY REPEAT IN 2 HOURS AS NEEDED       No current facility-administered medications for this visit.       Past Medical History:  Past Medical History:   Diagnosis Date    Abnormal Pap smear of cervix     ASCUS    Contraception management     Nml PAP with HPV neg 2013    Dysfunctional uterine bleeding     Eczema     History of prior pregnancies     A1    Hypertension     Malignant neoplasm     SQUAMOUS     Migraine     Non-healing skin lesion     SOB (shortness of breath)     Uterine leiomyoma        Past Surgical History:  Past Surgical History:   Procedure Laterality Date    APPENDECTOMY       SECTION       SECTION      MOHS SURGERY      micrographic surgery face right side - due to squamous cell carcinoma    WISDOM TOOTH EXTRACTION         Social History:  Social History     Socioeconomic History    Marital status:    Tobacco Use    Smoking status: Never     Passive exposure: Never    Smokeless tobacco: Never   Vaping Use    Vaping status: Never Used   Substance and Sexual Activity     "Alcohol use: No    Drug use: No    Sexual activity: Yes     Partners: Male     Birth control/protection: Post-menopausal       Family History:  Family History   Problem Relation Age of Onset    Migraines Other     Skin cancer Other     Cancer Other     Hypertension Other     Migraines Mother     Cancer Mother         Malignant Neoplasm of left eye/ Renal cell cancer    Hypertension Sister     Migraines Brother     Cancer Father     Breast cancer Neg Hx     Ovarian cancer Neg Hx      Vital Signs:   /80   Pulse 97   Temp 98.2 °F (36.8 °C) (Temporal)   Ht 149.9 cm (59.02\")   Wt 73.1 kg (161 lb 3.2 oz)   SpO2 97%   BMI 32.54 kg/m²      Physical Exam:  Physical Exam  Vitals reviewed.   Constitutional:       Appearance: Normal appearance.   Cardiovascular:      Rate and Rhythm: Normal rate.      Pulses: Normal pulses.   Pulmonary:      Effort: Pulmonary effort is normal.      Breath sounds: No wheezing.   Abdominal:      General: Abdomen is flat.   Skin:     General: Skin is warm.      Capillary Refill: Capillary refill takes less than 2 seconds.   Neurological:      General: No focal deficit present.      Mental Status: She is alert.   Psychiatric:         Mood and Affect: Mood normal.         Thought Content: Thought content normal.               PHQ-9 Score  PHQ-9 Total Score:       Lab Review  No visits with results within 3 Month(s) from this visit.   Latest known visit with results is:   Lab on 01/25/2024   Component Date Value Ref Range Status    Glucose 01/25/2024 65  65 - 99 mg/dL Final    BUN 01/25/2024 12  6 - 20 mg/dL Final    Creatinine 01/25/2024 0.77  0.57 - 1.00 mg/dL Final    Sodium 01/25/2024 142  136 - 145 mmol/L Final    Potassium 01/25/2024 3.8  3.5 - 5.2 mmol/L Final    Chloride 01/25/2024 104  98 - 107 mmol/L Final    CO2 01/25/2024 28.1  22.0 - 29.0 mmol/L Final    Calcium 01/25/2024 9.8  8.6 - 10.5 mg/dL Final    Total Protein 01/25/2024 6.7  6.0 - 8.5 g/dL Final    Albumin 01/25/2024 " 4.2  3.5 - 5.2 g/dL Final    ALT (SGPT) 01/25/2024 34 (H)  1 - 33 U/L Final    AST (SGOT) 01/25/2024 30  1 - 32 U/L Final    Alkaline Phosphatase 01/25/2024 71  39 - 117 U/L Final    Total Bilirubin 01/25/2024 0.6  0.0 - 1.2 mg/dL Final    Globulin 01/25/2024 2.5  gm/dL Final    A/G Ratio 01/25/2024 1.7  g/dL Final    BUN/Creatinine Ratio 01/25/2024 15.6  7.0 - 25.0 Final    Anion Gap 01/25/2024 9.9  5.0 - 15.0 mmol/L Final    eGFR 01/25/2024 89.5  >60.0 mL/min/1.73 Final    TSH 01/25/2024 1.190  0.270 - 4.200 uIU/mL Final    Vitamin B-12 01/25/2024 1,041 (H)  211 - 946 pg/mL Final    25 Hydroxy, Vitamin D 01/25/2024 46.9  30.0 - 100.0 ng/ml Final    WBC 01/25/2024 7.84  3.40 - 10.80 10*3/mm3 Final    RBC 01/25/2024 5.00  3.77 - 5.28 10*6/mm3 Final    Hemoglobin 01/25/2024 14.5  12.0 - 15.9 g/dL Final    Hematocrit 01/25/2024 44.2  34.0 - 46.6 % Final    MCV 01/25/2024 88.4  79.0 - 97.0 fL Final    MCH 01/25/2024 29.0  26.6 - 33.0 pg Final    MCHC 01/25/2024 32.8  31.5 - 35.7 g/dL Final    RDW 01/25/2024 12.8  12.3 - 15.4 % Final    RDW-SD 01/25/2024 41.3  37.0 - 54.0 fl Final    MPV 01/25/2024 9.3  6.0 - 12.0 fL Final    Platelets 01/25/2024 289  140 - 450 10*3/mm3 Final    Neutrophil % 01/25/2024 54.1  42.7 - 76.0 % Final    Lymphocyte % 01/25/2024 32.1  19.6 - 45.3 % Final    Monocyte % 01/25/2024 11.2  5.0 - 12.0 % Final    Eosinophil % 01/25/2024 1.8  0.3 - 6.2 % Final    Basophil % 01/25/2024 0.5  0.0 - 1.5 % Final    Immature Grans % 01/25/2024 0.3  0.0 - 0.5 % Final    Neutrophils, Absolute 01/25/2024 4.24  1.70 - 7.00 10*3/mm3 Final    Lymphocytes, Absolute 01/25/2024 2.52  0.70 - 3.10 10*3/mm3 Final    Monocytes, Absolute 01/25/2024 0.88  0.10 - 0.90 10*3/mm3 Final    Eosinophils, Absolute 01/25/2024 0.14  0.00 - 0.40 10*3/mm3 Final    Basophils, Absolute 01/25/2024 0.04  0.00 - 0.20 10*3/mm3 Final    Immature Grans, Absolute 01/25/2024 0.02  0.00 - 0.05 10*3/mm3 Final    nRBC 01/25/2024 0.0  0.0 - 0.2  /100 WBC Final        Radiology Results        Assessment / Plan         Assessment and Plan   Diagnoses and all orders for this visit:    1. Essential hypertension (Primary)  Assessment & Plan:  Hypertension is stable and controlled  Continue current treatment regimen.  Dietary sodium restriction.  Weight loss.  Ambulatory blood pressure monitoring.  Blood pressure will be reassessed in 3 months.    Orders:  -     lisinopril (PRINIVIL,ZESTRIL) 20 MG tablet; Take 1 tablet by mouth Daily.  Dispense: 90 tablet; Refill: 1    2. Primary insomnia  Assessment & Plan:  Much improved with Elavil, refill sent     Orders:  -     amitriptyline (ELAVIL) 25 MG tablet; Take 1 tablet by mouth Every Night.  Dispense: 90 tablet; Refill: 1    3. Class 1 obesity with serious comorbidity and body mass index (BMI) of 33.0 to 33.9 in adult, unspecified obesity type  Assessment & Plan:  Patient's (Body mass index is 32.54 kg/m².) indicates that they are obese (BMI >30) with health conditions that include obstructive sleep apnea and hypertension . Weight is improving with treatment. BMI  is above average; BMI management plan is completed. We discussed low calorie, low carb based diet program, portion control, increasing exercise and pharmacologic options including Continuing Wegovy at max dose  .     She has tolerated GLP-1 very well.  - Goal weight is 150 lbs     Orders:  -     Semaglutide-Weight Management (Wegovy) 2.4 MG/0.75ML solution auto-injector; Inject 2.4 mg under the skin into the appropriate area as directed 1 (One) Time Per Week.  Dispense: 3 mL; Refill: 2    4. Seasonal allergies  -     fexofenadine (ALLEGRA) 180 MG tablet; Take 1 tablet by mouth Daily.  Dispense: 90 tablet; Refill: 1  -     fluticasone (FLONASE) 50 MCG/ACT nasal spray; 1 spray into the nostril(s) as directed by provider Daily.  Dispense: 48 g; Refill: 5    5. Encounter for screening mammogram for malignant neoplasm of breast  -     Mammo Screening Digital  Tomosynthesis Bilateral With CAD; Future    6. Medication refill  -     magnesium gluconate (MAGONATE) 500 MG tablet; Take 1 tablet by mouth 2 (Two) Times a Day.  Dispense: 60 tablet; Refill: 2              Discussed possible differential diagnoses, testing, treatment, recommended non-pharmacological interventions, risks, warning signs to monitor for that would indicate need for follow-up in clinic or ER. If no improvement with these regimens or you have new or worsening symptoms follow-up. Patient verbalizes understanding and agreement with plan of care. Denies further needs or concerns.     Patient was given instructions and counseling regarding her condition and for health maintenance advised.        Health Maintenance  Health Maintenance:   Health Maintenance Due   Topic Date Due    ANNUAL PHYSICAL  2024    MAMMOGRAM  2024          Meds ordered during this visit  New Medications Ordered This Visit   Medications    Semaglutide-Weight Management (Wegovy) 2.4 MG/0.75ML solution auto-injector     Sig: Inject 2.4 mg under the skin into the appropriate area as directed 1 (One) Time Per Week.     Dispense:  3 mL     Refill:  2    amitriptyline (ELAVIL) 25 MG tablet     Sig: Take 1 tablet by mouth Every Night.     Dispense:  90 tablet     Refill:  1    fexofenadine (ALLEGRA) 180 MG tablet     Sig: Take 1 tablet by mouth Daily.     Dispense:  90 tablet     Refill:  1    fluticasone (FLONASE) 50 MCG/ACT nasal spray     Si spray into the nostril(s) as directed by provider Daily.     Dispense:  48 g     Refill:  5     **Patient requests 90 days supply**    lisinopril (PRINIVIL,ZESTRIL) 20 MG tablet     Sig: Take 1 tablet by mouth Daily.     Dispense:  90 tablet     Refill:  1    magnesium gluconate (MAGONATE) 500 MG tablet     Sig: Take 1 tablet by mouth 2 (Two) Times a Day.     Dispense:  60 tablet     Refill:  2       Meds stopped during this visit:  Medications Discontinued During This Encounter    Medication Reason    fluocinonide (LIDEX) 0.05 % ointment     fluocinonide (LIDEX) 0.05 % ointment     fluticasone (FLONASE) 50 MCG/ACT nasal spray Reorder    amitriptyline (ELAVIL) 25 MG tablet Reorder    fexofenadine (ALLEGRA) 180 MG tablet Reorder    lisinopril (PRINIVIL,ZESTRIL) 20 MG tablet Reorder    Semaglutide-Weight Management (Wegovy) 2.4 MG/0.75ML solution auto-injector Reorder    magnesium gluconate (MAGONATE) 500 MG tablet Reorder              Visit Diagnoses    ICD-10-CM ICD-9-CM   1. Essential hypertension  I10 401.9   2. Primary insomnia  F51.01 307.42   3. Class 1 obesity with serious comorbidity and body mass index (BMI) of 33.0 to 33.9 in adult, unspecified obesity type  E66.9 278.00    Z68.33 V85.33   4. Seasonal allergies  J30.2 477.9   5. Encounter for screening mammogram for malignant neoplasm of breast  Z12.31 V76.12   6. Medication refill  Z76.0 V68.1             Patient was given instructions and counseling regarding her condition or for health maintenance advice. Please see specific information pulled into the AVS if appropriate.     Follow Up   No follow-ups on file.      This document has been electronically signed by Beth Dill DO   July 12, 2024 11:57 EDT    Dictated Utilizing Dragon Dictation: Part of this note may be an electronic transcription/translation of spoken language to printed text using the Dragon Dictation System.    Beth Dill D.O.  [facility] [place]   Answers submitted by the patient for this visit:  Primary Reason for Visit (Submitted on 1/11/2024)  What is the primary reason for your visit?: High Blood Pressure

## 2024-09-04 ENCOUNTER — HOSPITAL ENCOUNTER (OUTPATIENT)
Dept: MAMMOGRAPHY | Facility: HOSPITAL | Age: 59
Discharge: HOME | End: 2024-09-04
Admitting: FAMILY MEDICINE
Payer: COMMERCIAL

## 2024-09-04 DIAGNOSIS — Z12.31 ENCOUNTER FOR SCREENING MAMMOGRAM FOR MALIGNANT NEOPLASM OF BREAST: ICD-10-CM

## 2024-09-04 LAB
NCCN CRITERIA FLAG: NORMAL
TYRER CUZICK SCORE: 6.5

## 2024-09-04 PROCEDURE — 77067 SCR MAMMO BI INCL CAD: CPT

## 2024-09-04 PROCEDURE — 77063 BREAST TOMOSYNTHESIS BI: CPT

## 2024-09-23 ENCOUNTER — OFFICE VISIT (OUTPATIENT)
Dept: FAMILY MEDICINE CLINIC | Facility: CLINIC | Age: 59
End: 2024-09-23
Payer: COMMERCIAL

## 2024-09-23 VITALS
SYSTOLIC BLOOD PRESSURE: 140 MMHG | TEMPERATURE: 98.2 F | BODY MASS INDEX: 32.9 KG/M2 | WEIGHT: 163 LBS | DIASTOLIC BLOOD PRESSURE: 70 MMHG | HEART RATE: 72 BPM

## 2024-09-23 DIAGNOSIS — F51.01 PRIMARY INSOMNIA: Primary | ICD-10-CM

## 2024-09-23 PROCEDURE — 99214 OFFICE O/P EST MOD 30 MIN: CPT | Performed by: FAMILY MEDICINE

## 2024-09-23 RX ORDER — ZOLPIDEM TARTRATE 5 MG/1
5 TABLET ORAL NIGHTLY PRN
Qty: 30 TABLET | Refills: 0 | Status: SHIPPED | OUTPATIENT
Start: 2024-09-23

## 2024-10-08 ENCOUNTER — PATIENT MESSAGE (OUTPATIENT)
Dept: FAMILY MEDICINE CLINIC | Facility: CLINIC | Age: 59
End: 2024-10-08
Payer: COMMERCIAL

## 2024-10-08 DIAGNOSIS — E66.811 CLASS 1 OBESITY WITH SERIOUS COMORBIDITY AND BODY MASS INDEX (BMI) OF 33.0 TO 33.9 IN ADULT, UNSPECIFIED OBESITY TYPE: ICD-10-CM

## 2024-10-08 RX ORDER — SEMAGLUTIDE 2.4 MG/.75ML
2.4 INJECTION, SOLUTION SUBCUTANEOUS WEEKLY
Qty: 3 ML | Refills: 2 | Status: SHIPPED | OUTPATIENT
Start: 2024-10-08

## 2024-10-08 RX ORDER — SEMAGLUTIDE 2.4 MG/.75ML
2.4 INJECTION, SOLUTION SUBCUTANEOUS WEEKLY
Qty: 3 ML | Refills: 2 | Status: SHIPPED | OUTPATIENT
Start: 2024-10-08 | End: 2024-10-08 | Stop reason: SDUPTHER

## 2024-10-15 ENCOUNTER — OFFICE VISIT (OUTPATIENT)
Dept: FAMILY MEDICINE CLINIC | Facility: CLINIC | Age: 59
End: 2024-10-15
Payer: COMMERCIAL

## 2024-10-15 VITALS
SYSTOLIC BLOOD PRESSURE: 130 MMHG | DIASTOLIC BLOOD PRESSURE: 86 MMHG | HEART RATE: 74 BPM | HEIGHT: 59 IN | BODY MASS INDEX: 33.26 KG/M2 | WEIGHT: 165 LBS | TEMPERATURE: 98 F

## 2024-10-15 DIAGNOSIS — F51.01 PRIMARY INSOMNIA: Primary | ICD-10-CM

## 2024-10-15 DIAGNOSIS — E66.811 CLASS 1 OBESITY WITH SERIOUS COMORBIDITY AND BODY MASS INDEX (BMI) OF 33.0 TO 33.9 IN ADULT, UNSPECIFIED OBESITY TYPE: ICD-10-CM

## 2024-10-15 DIAGNOSIS — Z51.81 THERAPEUTIC DRUG MONITORING: ICD-10-CM

## 2024-10-15 DIAGNOSIS — I10 ESSENTIAL HYPERTENSION: ICD-10-CM

## 2024-10-15 DIAGNOSIS — K59.00 CONSTIPATION, UNSPECIFIED CONSTIPATION TYPE: ICD-10-CM

## 2024-10-15 PROCEDURE — 99214 OFFICE O/P EST MOD 30 MIN: CPT | Performed by: FAMILY MEDICINE

## 2024-10-15 RX ORDER — SEMAGLUTIDE 2.4 MG/.75ML
2.4 INJECTION, SOLUTION SUBCUTANEOUS WEEKLY
Qty: 9 ML | Refills: 1 | Status: SHIPPED | OUTPATIENT
Start: 2024-10-15 | End: 2024-10-15

## 2024-10-15 RX ORDER — SEMAGLUTIDE 2.4 MG/.75ML
2.4 INJECTION, SOLUTION SUBCUTANEOUS WEEKLY
Qty: 9 ML | Refills: 1 | Status: SHIPPED | OUTPATIENT
Start: 2024-10-15

## 2024-10-15 RX ORDER — ZOLPIDEM TARTRATE 6.25 MG/1
6.25 TABLET, FILM COATED, EXTENDED RELEASE ORAL NIGHTLY PRN
Qty: 30 TABLET | Refills: 2 | Status: SHIPPED | OUTPATIENT
Start: 2024-10-15

## 2024-10-15 NOTE — ASSESSMENT & PLAN NOTE
Improved with Ambien, will change to Ambien CR 6.25 mg nightly.     The patient has read and signed the [facility] Controlled Substance Contract.  I will continue to see patient for regular follow up appointments.  They are well controlled on their medication.  DARIUS is updated every 3 months. The patient is aware of the potential for addiction and dependence.

## 2024-10-15 NOTE — PROGRESS NOTES
Subjective     Chief Complaint  Weight Loss (Has constipation)    Subjective          Natalie Kan is a 59 y.o. female who presents today to [facility] for follow up.    HPI:   Hypertension  This is a recurrent problem. The current episode started more than 1 year ago. The problem has been gradually improving since onset. Pertinent negatives include no anxiety, malaise/fatigue, orthopnea, palpitations, peripheral edema or shortness of breath. There are no associated agents to hypertension. There are no compliance problems.    Obesity    Weight Loss          Ms. Kan is a very pleasant 59-year-old female presents today to follow-up on chronic medical conditions with hypertension, migraine, insomnia.  Her starting weight was 196 lbs. Today, she weighed 165 lbs. - Down 35 lbs -  She is also now following with the gym and a  etc.  She reports that she has more energy and is feeling better overall.  She is currently prescribed Wegovy 2.4 mg weekly. She reports that she is feeling great and exercising during the week.  By her home scale, she has been 164 lbs.   Her goal weight is 150 lbs.     Hypertension -current blood pressure in office today is 130/86, currently prescribed lisinopril 20 mg daily     Since our last visit, I saw her for severe insomnia. I started Ambien 5 mg nightly. This has helped. She does still have some nighttime awakenings though. But overall improved.     She is having some issues with constipation. She drinks plenty of water. She has tried miralax which initially helped but no longer helping now. She has tried to increase her fiber some in her diet jaydon the last few weeks. She sometimes has issues with constipation but this has been worse than normal for her     The following portions of the patient's history were reviewed and updated as appropriate: allergies, current medications, past family history, past medical history, past  social history, past surgical history and problem list.    Objective     Objective     Allergy:   Allergies   Allergen Reactions    Augmentin [Amoxicillin-Pot Clavulanate] GI Intolerance        Current Medications:   Current Outpatient Medications   Medication Sig Dispense Refill    calcium carbonate (OS-TORREY) 600 MG tablet Take 1 tablet by mouth Daily.      estradiol (Vagifem) 10 MCG tablet vaginal tablet Insert 1 tablet into the vagina 2 (Two) Times a Week. 8 tablet 12    fexofenadine (ALLEGRA) 180 MG tablet Take 1 tablet by mouth Daily. 90 tablet 1    fluticasone (FLONASE) 50 MCG/ACT nasal spray Place 1 spray into the nostril(s) as directed by provider Daily. 48 g 5    lisinopril (PRINIVIL,ZESTRIL) 20 MG tablet Take 1 tablet by mouth Daily. 90 tablet 1    magnesium gluconate (MAGONATE) 500 MG tablet Take 1 tablet by mouth 2 (Two) Times a Day. 60 tablet 2    Multiple Vitamins-Minerals (MULTIVITAMIN ADULT PO) Take  by mouth.      Semaglutide-Weight Management (Wegovy) 2.4 MG/0.75ML solution auto-injector Inject 2.4 mg under the skin into the appropriate area as directed 1 (One) Time Per Week. 9 mL 1    Ubrelvy 100 MG tablet TAKE 1 TABLET BY MOUTH 1 TIME FOR 1 DOSE. MAY REPEAT IN 2 HOURS AS NEEDED      zolpidem CR (Ambien CR) 6.25 MG CR tablet Take 1 tablet by mouth At Night As Needed for Sleep. 30 tablet 2     No current facility-administered medications for this visit.       Past Medical History:  Past Medical History:   Diagnosis Date    Abnormal Pap smear of cervix     ASCUS    Contraception management     Nml PAP with HPV neg 2013    Dysfunctional uterine bleeding     Eczema     History of prior pregnancies     A1    Hypertension     Malignant neoplasm     SQUAMOUS     Migraine     Non-healing skin lesion     SOB (shortness of breath)     Uterine leiomyoma        Past Surgical History:  Past Surgical History:   Procedure Laterality Date    APPENDECTOMY  1986     SECTION       SECTION   "1990    MOHS SURGERY      micrographic surgery face right side - due to squamous cell carcinoma    WISDOM TOOTH EXTRACTION         Social History:  Social History     Socioeconomic History    Marital status:    Tobacco Use    Smoking status: Never     Passive exposure: Never    Smokeless tobacco: Never   Vaping Use    Vaping status: Never Used   Substance and Sexual Activity    Alcohol use: No    Drug use: No    Sexual activity: Yes     Partners: Male     Birth control/protection: Post-menopausal       Family History:  Family History   Problem Relation Age of Onset    Migraines Other     Skin cancer Other     Cancer Other     Hypertension Other     Migraines Mother     Cancer Mother         Malignant Neoplasm of left eye/ Renal cell cancer    Hypertension Sister     Migraines Brother     Cancer Father     Breast cancer Neg Hx     Ovarian cancer Neg Hx      Vital Signs:   /86   Pulse 74   Temp 98 °F (36.7 °C) (Infrared)   Ht 149.9 cm (59.02\")   Wt 74.8 kg (165 lb)   BMI 33.31 kg/m²      Physical Exam:  Physical Exam  Vitals reviewed.   Constitutional:       Appearance: Normal appearance.   Cardiovascular:      Rate and Rhythm: Normal rate.      Pulses: Normal pulses.   Pulmonary:      Effort: Pulmonary effort is normal.      Breath sounds: No wheezing.   Abdominal:      General: Abdomen is flat.   Skin:     General: Skin is warm.      Capillary Refill: Capillary refill takes less than 2 seconds.   Neurological:      General: No focal deficit present.      Mental Status: She is alert.   Psychiatric:         Mood and Affect: Mood normal.         Thought Content: Thought content normal.               PHQ-9 Score  PHQ-9 Total Score:       Lab Review  Orders Only on 09/04/2024   Component Date Value Ref Range Status    TyrerCuzick 09/04/2024 6.5   Final    NCCN 09/04/2024 NCCN not met   Final    High Risk Cancer Risk Assessment        Radiology Results        Assessment / Plan         Assessment and Plan "   Diagnoses and all orders for this visit:    1. Primary insomnia (Primary)  Assessment & Plan:  Improved with Ambien, will change to Ambien CR 6.25 mg nightly.     The patient has read and signed the [facility] Controlled Substance Contract.  I will continue to see patient for regular follow up appointments.  They are well controlled on their medication.  DARIUS is updated every 3 months. The patient is aware of the potential for addiction and dependence.      Orders:  -     zolpidem CR (Ambien CR) 6.25 MG CR tablet; Take 1 tablet by mouth At Night As Needed for Sleep.  Dispense: 30 tablet; Refill: 2    2. Essential hypertension  Assessment & Plan:  Hypertension is stable and controlled  Continue current treatment regimen.  Dietary sodium restriction.  Weight loss.  Ambulatory blood pressure monitoring.  Blood pressure will be reassessed in 3 months.      3. Class 1 obesity with serious comorbidity and body mass index (BMI) of 33.0 to 33.9 in adult, unspecified obesity type  -     Discontinue: Semaglutide-Weight Management (Wegovy) 2.4 MG/0.75ML solution auto-injector; Inject 2.4 mg under the skin into the appropriate area as directed 1 (One) Time Per Week.  Dispense: 9 mL; Refill: 1  -     Discontinue: Semaglutide-Weight Management (Wegovy) 2.4 MG/0.75ML solution auto-injector; Inject 2.4 mg under the skin into the appropriate area as directed 1 (One) Time Per Week.  Dispense: 9 mL; Refill: 1  -     Semaglutide-Weight Management (Wegovy) 2.4 MG/0.75ML solution auto-injector; Inject 2.4 mg under the skin into the appropriate area as directed 1 (One) Time Per Week.  Dispense: 9 mL; Refill: 1    4. Constipation, unspecified constipation type  Assessment & Plan:  - Keep increasing fiber in diet   - continue Miralax daily, add in colace daily, as well.       5. Therapeutic drug monitoring  -     Compliance Drug Analysis, Ur - Urine, Clean Catch; Future                Discussed possible differential diagnoses,  testing, treatment, recommended non-pharmacological interventions, risks, warning signs to monitor for that would indicate need for follow-up in clinic or ER. If no improvement with these regimens or you have new or worsening symptoms follow-up. Patient verbalizes understanding and agreement with plan of care. Denies further needs or concerns.     Patient was given instructions and counseling regarding her condition and for health maintenance advised.        Health Maintenance  Health Maintenance:   Health Maintenance Due   Topic Date Due    ANNUAL PHYSICAL  01/12/2024          Meds ordered during this visit  New Medications Ordered This Visit   Medications    zolpidem CR (Ambien CR) 6.25 MG CR tablet     Sig: Take 1 tablet by mouth At Night As Needed for Sleep.     Dispense:  30 tablet     Refill:  2    Semaglutide-Weight Management (Wegovy) 2.4 MG/0.75ML solution auto-injector     Sig: Inject 2.4 mg under the skin into the appropriate area as directed 1 (One) Time Per Week.     Dispense:  9 mL     Refill:  1       Meds stopped during this visit:  Medications Discontinued During This Encounter   Medication Reason    zolpidem (AMBIEN) 5 MG tablet     Semaglutide-Weight Management (Wegovy) 2.4 MG/0.75ML solution auto-injector Reorder    Semaglutide-Weight Management (Wegovy) 2.4 MG/0.75ML solution auto-injector     Semaglutide-Weight Management (Wegovy) 2.4 MG/0.75ML solution auto-injector                 Visit Diagnoses    ICD-10-CM ICD-9-CM   1. Primary insomnia  F51.01 307.42   2. Essential hypertension  I10 401.9   3. Class 1 obesity with serious comorbidity and body mass index (BMI) of 33.0 to 33.9 in adult, unspecified obesity type  E66.811 278.00    Z68.33 V85.33   4. Constipation, unspecified constipation type  K59.00 564.00   5. Therapeutic drug monitoring  Z51.81 V58.83               Patient was given instructions and counseling regarding her condition or for health maintenance advice. Please see specific  information pulled into the AVS if appropriate.     Follow Up   Return in about 3 months (around 1/15/2025) for followup Insomnia, weight loss , Annual, followup.      This document has been electronically signed by Beth Dill DO   October 15, 2024 15:48 EDT    Dictated Utilizing Dragon Dictation: Part of this note may be an electronic transcription/translation of spoken language to printed text using the Dragon Dictation System.    Beth Dill D.O.  [facility]   Answers submitted by the patient for this visit:  Primary Reason for Visit (Submitted on 1/11/2024)  What is the primary reason for your visit?: High Blood Pressure

## 2024-10-16 DIAGNOSIS — E66.811 CLASS 1 OBESITY WITH SERIOUS COMORBIDITY AND BODY MASS INDEX (BMI) OF 33.0 TO 33.9 IN ADULT, UNSPECIFIED OBESITY TYPE: ICD-10-CM

## 2024-10-16 RX ORDER — SEMAGLUTIDE 2.4 MG/.75ML
2.4 INJECTION, SOLUTION SUBCUTANEOUS WEEKLY
Qty: 9 ML | Refills: 1 | Status: CANCELLED | OUTPATIENT
Start: 2024-10-16

## 2024-10-21 DIAGNOSIS — E66.811 CLASS 1 OBESITY WITH SERIOUS COMORBIDITY AND BODY MASS INDEX (BMI) OF 33.0 TO 33.9 IN ADULT, UNSPECIFIED OBESITY TYPE: ICD-10-CM

## 2024-10-21 RX ORDER — SEMAGLUTIDE 2.4 MG/.75ML
2.4 INJECTION, SOLUTION SUBCUTANEOUS WEEKLY
Qty: 9 ML | Refills: 1 | Status: CANCELLED | OUTPATIENT
Start: 2024-10-21

## 2024-10-22 LAB — DRUGS UR: NORMAL

## 2024-10-24 DIAGNOSIS — E66.811 CLASS 1 OBESITY WITH SERIOUS COMORBIDITY AND BODY MASS INDEX (BMI) OF 33.0 TO 33.9 IN ADULT, UNSPECIFIED OBESITY TYPE: ICD-10-CM

## 2024-10-24 RX ORDER — SEMAGLUTIDE 2.4 MG/.75ML
2.4 INJECTION, SOLUTION SUBCUTANEOUS WEEKLY
Qty: 9 ML | Refills: 1 | Status: SHIPPED | OUTPATIENT
Start: 2024-10-24

## 2024-12-23 RX ORDER — METRONIDAZOLE 500 MG/1
500 TABLET ORAL EVERY 6 HOURS
Qty: 28 TABLET | Refills: 0 | Status: SHIPPED | OUTPATIENT
Start: 2024-12-23 | End: 2024-12-30

## 2024-12-23 RX ORDER — CIPROFLOXACIN 500 MG/1
500 TABLET, FILM COATED ORAL 2 TIMES DAILY
Qty: 14 TABLET | Refills: 0 | Status: SHIPPED | OUTPATIENT
Start: 2024-12-23 | End: 2024-12-30

## 2025-01-03 ENCOUNTER — TELEPHONE (OUTPATIENT)
Dept: FAMILY MEDICINE CLINIC | Facility: CLINIC | Age: 60
End: 2025-01-03

## 2025-01-03 ENCOUNTER — SPECIALTY PHARMACY (OUTPATIENT)
Dept: PHARMACY | Facility: TELEHEALTH | Age: 60
End: 2025-01-03
Payer: COMMERCIAL

## 2025-01-03 RX ORDER — UBROGEPANT 100 MG/1
100 TABLET ORAL DAILY PRN
Qty: 16 TABLET | Refills: 11 | Status: SHIPPED | OUTPATIENT
Start: 2025-01-03

## 2025-01-03 NOTE — TELEPHONE ENCOUNTER
Caller: [facility] [place]    Relationship to patient: Pharmacy      Best call back number: [phone]    Provider: DR DOLAN    Medication PA needed: UBRELVY 100MG    Reason for call/Prior Auth: INSURANCE IS REQUIRING PRIOR AUTHORIZATION FOR MEDICATION.

## 2025-01-07 ENCOUNTER — SPECIALTY PHARMACY (OUTPATIENT)
Dept: PHARMACY | Facility: TELEHEALTH | Age: 60
End: 2025-01-07
Payer: COMMERCIAL

## 2025-01-07 NOTE — PROGRESS NOTES
Specialty Pharmacy Refill Coordination Note     Natalie is a 59 y.o. female contacted today regarding refills of  ubrelvy specialty medication(s).    Reviewed and verified with patient:       Specialty medication(s) and dose(s) confirmed: yes                   Follow-up: 21 day(s)     Lila Crane, Pharmacy Technician  Specialty Pharmacy Technician

## 2025-01-08 ENCOUNTER — SPECIALTY PHARMACY (OUTPATIENT)
Dept: PHARMACY | Facility: TELEHEALTH | Age: 60
End: 2025-01-08
Payer: COMMERCIAL

## 2025-01-08 NOTE — PROGRESS NOTES
Specialty Pharmacy Patient Management Program  Initial Assessment     Natalie Kan is a 59 y.o. female with migraines and enrolled in the Patient Management program offered by [facility]. An initial outreach was conducted, including assessment of therapy appropriateness and specialty medication education for Ubrelvy. The patient was introduced to services offered by [facility], including: regular assessments, refill coordination, curbside pick-up or mail order delivery options, prior authorization maintenance, and financial assistance programs as applicable. The patient was also provided with contact information for the pharmacy team.     Insurance Coverage & Financial Support  Billing [employer] and manufacture copay card.    Relevant Past Medical History and Comorbidities  Relevant medical history and concomitant health conditions were discussed with the patient. The patient's chart has been reviewed for relevant past medical history and comorbid health conditions and updated as necessary.   Past Medical History:   Diagnosis Date    Abnormal Pap smear of cervix     ASCUS    Contraception management     Nml PAP with HPV neg 2013    Dysfunctional uterine bleeding     Eczema     History of prior pregnancies     A1    Hypertension     Malignant neoplasm     SQUAMOUS     Migraine     Non-healing skin lesion     SOB (shortness of breath)     Uterine leiomyoma      Social History     Socioeconomic History    Marital status:    Tobacco Use    Smoking status: Never     Passive exposure: Never    Smokeless tobacco: Never   Vaping Use    Vaping status: Never Used   Substance and Sexual Activity    Alcohol use: No    Drug use: No    Sexual activity: Yes     Partners: Male     Birth control/protection: Post-menopausal     Problem list reviewed by Saul Nava RPH on 2025 at 10:02 AM    Allergies  Known allergies and reactions were discussed with the  patient. The patient's chart has been reviewed for allergy information and updated as necessary.   Augmentin [amoxicillin-pot clavulanate]  Allergies reviewed by Saul Nava RP on 1/8/2025 at 10:02 AM    Current Medication List  This medication list has been reviewed with the patient and evaluated for any interactions or necessary modifications/recommendations, and updated to include all prescription medications, OTC medications, and supplements the patient is currently taking. This list reflects what is contained in the patient's profile, which has also been marked as reviewed to communicate to other providers it is the most up to date version of the patient's current medication therapy.     Current Outpatient Medications:     calcium carbonate (OS-TORREY) 600 MG tablet, Take 1 tablet by mouth Daily., Disp: , Rfl:     estradiol (Vagifem) 10 MCG tablet vaginal tablet, Insert 1 tablet into the vagina 2 (Two) Times a Week., Disp: 8 tablet, Rfl: 12    fexofenadine (ALLEGRA) 180 MG tablet, Take 1 tablet by mouth Daily., Disp: 90 tablet, Rfl: 1    fluticasone (FLONASE) 50 MCG/ACT nasal spray, Place 1 spray into the nostril(s) as directed by provider Daily., Disp: 48 g, Rfl: 5    lisinopril (PRINIVIL,ZESTRIL) 20 MG tablet, Take 1 tablet by mouth Daily., Disp: 90 tablet, Rfl: 1    magnesium gluconate (MAGONATE) 500 MG tablet, Take 1 tablet by mouth 2 (Two) Times a Day., Disp: 60 tablet, Rfl: 2    Multiple Vitamins-Minerals (MULTIVITAMIN ADULT PO), Take  by mouth., Disp: , Rfl:     Semaglutide-Weight Management (Wegovy) 2.4 MG/0.75ML solution auto-injector, Inject 2.4 mg under the skin into the appropriate area as directed 1 (One) Time Per Week., Disp: 9 mL, Rfl: 1    Ubrelvy 100 MG tablet, Take 1 tablet by mouth Daily As Needed for migraine. may repeat in 2 hours if needed. max 200 mg per 24 hours, Disp: 16 tablet, Rfl: 11    zolpidem CR (Ambien CR) 6.25 MG CR tablet, Take 1 tablet by mouth At Night As Needed for  Sleep., Disp: 30 tablet, Rfl: 2  Medicines reviewed by Saul Nava [facility] on 1/8/2025 at 10:02 AM    Drug Interactions  none     Relevant Laboratory Values  Lab Results   Component Value Date    GLUCOSE 65 01/25/2024    CALCIUM 9.8 01/25/2024     01/25/2024    K 3.8 01/25/2024    CO2 28.1 01/25/2024     01/25/2024    BUN 12 01/25/2024    CREATININE 0.77 01/25/2024    BCR 15.6 01/25/2024    ANIONGAP 9.9 01/25/2024     Lab Results   Component Value Date    WBC 7.84 01/25/2024    HGB 14.5 01/25/2024    HCT 44.2 01/25/2024    MCV 88.4 01/25/2024     01/25/2024     Lab Value Review  The above lab values have been reviewed; the following specialty medication(s) dose adjustment(s) are recommended: none.    Initial Education Provided for Specialty Medication  The patient has been provided with the following education and any applicable administration techniques (i.e. self-injection) have been demonstrated for the therapies indicated. All questions and concerns have been addressed prior to the patient receiving the medication, and the patient has verbalized understanding of the education and any materials provided. Additional patient education shall be provided and documented upon request by the patient, provider or payer.      Ubrelvy (Ubrogepant)  Medication Expectations   Why am I taking this medication? You are taking this medication to treat acute migraines.   What should I expect while on this medication? You should expect to see a decrease in the frequency and severity of your migraines.   How does the medication work? Ubrelvy is a monoclonal antibody that binds to calcitonin gene-related peptide (CGRP) and blocks its binding to the receptor decreasing the severity of migraines.   How long will I be on this medication for? The amount of time you will be on this medication will be determined by your doctor and your response to the medication.    How do I take this medication? Take as directed on  your prescription label.  Reviewed plan for Ubrelvy 100mg (1 tablet) PO daily prn; may repeat x 1 in 2 hours, if needed. Max dosage = 200mg/24 hours.    What are some possible side effects? Potential side effects including, but not limited to nausea and somnolence. Pt verbalized understanding.   What happens if I miss a dose? This is taken as needed for migraines.     Medication Safety   What are things I should warn my doctor immediately about? Allergic reaction: Itching or hives, swelling in your face or hands, swelling or tingling in your mouth or throat, chest tightness, trouble breathing     What are things that I should be cautious of? Tell your doctor if you are pregnant or breastfeeding, or if you have kidney disease or liver disease.   What are some medications that can interact with this one? Concomitant use of strong CY inhibitors, check with your pharmacist or provider before starting any new medications, avoid grapefruit juice.     Medication Storage/Handling   How should I handle this medication? Keep this medication out of reach of pets/children.   How does this medication need to be stored? Store at a controlled room temperature between 20 and 25 degrees C (68 and 77 degrees F), with excursions permitted between 15 and 30 degrees C (59 and 86 degrees F) away from direct sunlight and moisture.   How should I dispose of this medication? There should not be a need to dispose of this medication unless your provider decides to change the dose or therapy. If that is the case, take to your local police station for proper disposal. Some pharmacies also have take-back bins for medication drop-off.      Resources/Support   How can I remind myself to take this medication? This is taken as needed for migraines.   Is financial support available?  Yes, [email or web address] can provide co-pay cards if you have commercial insurance or patient assistance if you have Medicare or no insurance.    Which vaccines  are recommended for me? Talk to your doctor about these vaccines: Flu, Coronavirus (COVID-19), Pneumococcal (pneumonia), Tdap, Hepatitis B, Zoster (shingles)         Adherence, Self-Administration, and Current Therapy Problems  Adherence related to the patient's specialty therapy was discussed with the patient. The Adherence segment of this outreach has been reviewed and updated.          Additional Barriers to Patient Self-Administration: none  Methods for Supporting Patient Self-Administration: none    Open Medication Therapy Problems  No medication therapy recommendations to display    Goals of Therapy   Goals Addressed Today        Specialty Pharmacy General Goal      Reduce severity and duration of migraine headache by 50%.              Reassessment Plan & Follow-Up  Medication Therapy Changes: continue Ubrelvy 100 mg at onset of migraine headache may repeat after 2 hours if needed max 2 tablets in 24 hours. First fill at [facility]   Additional Plans, Therapy Recommendations, or Therapy Problems to Be Addressed: none   Patient has been on Ubrelvy in the past and states ubrelvy works well with no ADRS  Pharmacist to perform regular reassessments no more than (6) months from the previous assessment.  Welcome information and patient satisfaction survey to be sent by retail team with patient's initial fill.  Care Coordinator to set up future refill outreaches, coordinate prescription delivery, and escalate clinical questions to pharmacist.     Attestation  I attest the patient was actively involved in and has agreed to the above plan of care. I attest that the initiated specialty medication(s) are appropriate for the patient based on my assessment. If the prescribed therapy is at any point deemed not appropriate based on the current or future assessments, a consultation will be initiated with the patient's specialty care provider to determine the best course of action. The revised plan of therapy will be documented  along with any reassessments and/or additional patient education provided.     Electronically signed by Saul Nava RPH, 01/08/25, 10:03 AM EST.

## 2025-01-15 DIAGNOSIS — F51.01 PRIMARY INSOMNIA: ICD-10-CM

## 2025-01-16 RX ORDER — ZOLPIDEM TARTRATE 6.25 MG/1
6.25 TABLET, FILM COATED, EXTENDED RELEASE ORAL NIGHTLY PRN
Qty: 30 TABLET | Refills: 2 | Status: SHIPPED | OUTPATIENT
Start: 2025-01-16

## 2025-01-17 ENCOUNTER — OFFICE VISIT (OUTPATIENT)
Dept: FAMILY MEDICINE CLINIC | Facility: CLINIC | Age: 60
End: 2025-01-17
Payer: COMMERCIAL

## 2025-01-17 VITALS
HEART RATE: 68 BPM | SYSTOLIC BLOOD PRESSURE: 118 MMHG | BODY MASS INDEX: 32.46 KG/M2 | HEIGHT: 59 IN | DIASTOLIC BLOOD PRESSURE: 70 MMHG | RESPIRATION RATE: 20 BRPM | TEMPERATURE: 98.6 F | WEIGHT: 161 LBS | OXYGEN SATURATION: 98 %

## 2025-01-17 DIAGNOSIS — I10 ESSENTIAL HYPERTENSION: ICD-10-CM

## 2025-01-17 DIAGNOSIS — K21.9 GASTROESOPHAGEAL REFLUX DISEASE WITHOUT ESOPHAGITIS: ICD-10-CM

## 2025-01-17 DIAGNOSIS — G43.809 OTHER MIGRAINE WITHOUT STATUS MIGRAINOSUS, NOT INTRACTABLE: ICD-10-CM

## 2025-01-17 DIAGNOSIS — F51.01 PRIMARY INSOMNIA: ICD-10-CM

## 2025-01-17 DIAGNOSIS — Z00.00 ENCOUNTER FOR ANNUAL PHYSICAL EXAM: Primary | ICD-10-CM

## 2025-01-17 NOTE — ASSESSMENT & PLAN NOTE
Annual wellness exam completed today. Health Maintenance including immunizations was updated and reflected in the chart. Yearly screening labs were ordered.     Further recommendations to be given once lab data received.     Health advice: healthy food choices with fresh fruits and vegetables, maintain sleep pattern at least 8 hours, avoid texting and distracted driving practices; wear safety belt, engage in regular exercise, maintain healthy weight, use safe sex practices, avoid alcohol and illicit drugs. Maintain immunizations that are up to date. Maintain health maintenance: Colon cancer screening, DEXA, Mammogram, PAP, etc.  Follow up with PCP if struggling with depression or anxiety. Keep regular dental and eye exams. Brush and floss teeth daily.     I suggest they take a daily multivitamin that is age-appropriate (example women's One-A-Day.)  Patient voiced understanding of these instructions.     Follow up Annually for Physical

## 2025-01-17 NOTE — ASSESSMENT & PLAN NOTE
Continue Ambien CR 6.25 mg nightly.     The patient has read and signed the [facility] Controlled Substance Contract.  I will continue to see patient for regular follow up appointments.  They are well controlled on their medication.  DARIUS is updated every 3 months. The patient is aware of the potential for addiction and dependence.

## 2025-01-17 NOTE — ASSESSMENT & PLAN NOTE
Hypertension is stable and controlled  Continue current treatment regimen.  Medication changes per orders.  Regular aerobic exercise.  Ambulatory blood pressure monitoring.  Blood pressure will be reassessed in 3 months.   Continue Lisinopril 20 mg daily.

## 2025-01-17 NOTE — PROGRESS NOTES
Subjective     Chief Complaint  Annual Exam, Hypertension, and Insomnia    Subjective          Natalie Kan is a 59 y.o. female who presents today to [facility] for follow up.    HPI:   History of Present Illness      Ms. Kan is a very pleasant 59-year-old female presents today to follow-up on chronic medical conditions and annual physical exam.     Her past medical history includes hypertension, migraine, insomnia.  Her starting weight was 196 lbs. Today, she weighed 161 lbs. She is also now following with the gym and a  etc.    She is currently prescribed Wegovy 2.4 mg weekly.   She has three more injections of Wegovy and would like to stop it as she has made a lot of lifestyle changes.     Hypertension - current blood pressure in office today is 118/70 currently prescribed Lisinopril 20 mg daily     Insomnia - taking Ambien CR 6.25 mg nightly, she is up to date with her controlled substance agreement (10/2024)    Migraines - takes Ubrelvy 100 mg as needed, she has very few migraines now.     She had a GI bug a few months ago. She was having a lot of nausea etc with this. Pepcid did not help. She stated a two week course of Nexium. This pretty much resolved her symptoms.     The following portions of the patient's history were reviewed and updated as appropriate: allergies, current medications, past family history, past medical history, past social history, past surgical history and problem list.    Objective     Objective     Allergy:   Allergies   Allergen Reactions    Augmentin [Amoxicillin-Pot Clavulanate] GI Intolerance        Current Medications:   Current Outpatient Medications   Medication Sig Dispense Refill    calcium carbonate (OS-TORREY) 600 MG tablet Take 1 tablet by mouth Daily.      estradiol (Vagifem) 10 MCG tablet vaginal tablet Insert 1 tablet into the vagina 2 (Two) Times a Week. 8 tablet 12    fexofenadine (ALLEGRA) 180 MG tablet Take  1 tablet by mouth Daily. 90 tablet 1    fluticasone (FLONASE) 50 MCG/ACT nasal spray Place 1 spray into the nostril(s) as directed by provider Daily. 48 g 5    lisinopril (PRINIVIL,ZESTRIL) 20 MG tablet Take 1 tablet by mouth Daily. 90 tablet 1    magnesium gluconate (MAGONATE) 500 MG tablet Take 1 tablet by mouth 2 (Two) Times a Day. 60 tablet 2    Multiple Vitamins-Minerals (MULTIVITAMIN ADULT PO) Take  by mouth.      Ubrelvy 100 MG tablet Take 1 tablet by mouth Daily As Needed for migraine. may repeat in 2 hours if needed. max 200 mg per 24 hours 16 tablet 11    zolpidem CR (Ambien CR) 6.25 MG CR tablet Take 1 tablet by mouth At Night As Needed for Sleep. 30 tablet 2     No current facility-administered medications for this visit.       Past Medical History:  Past Medical History:   Diagnosis Date    Abnormal Pap smear of cervix     ASCUS    Contraception management     Nml PAP with HPV neg 2013    Dysfunctional uterine bleeding     Eczema     History of prior pregnancies     A1    Hypertension     Malignant neoplasm     SQUAMOUS     Migraine     Non-healing skin lesion     SOB (shortness of breath)     Uterine leiomyoma        Past Surgical History:  Past Surgical History:   Procedure Laterality Date    APPENDECTOMY       SECTION       SECTION      MOHS SURGERY      micrographic surgery face right side - due to squamous cell carcinoma    WISDOM TOOTH EXTRACTION         Social History:  Social History     Socioeconomic History    Marital status:    Tobacco Use    Smoking status: Never     Passive exposure: Never    Smokeless tobacco: Never   Vaping Use    Vaping status: Never Used   Substance and Sexual Activity    Alcohol use: No    Drug use: No    Sexual activity: Yes     Partners: Male     Birth control/protection: Post-menopausal       Family History:  Family History   Problem Relation Age of Onset    Migraines Other     Skin cancer Other     Cancer Other      "Hypertension Other     Migraines Mother     Cancer Mother         Malignant Neoplasm of left eye/ Renal cell cancer    Hypertension Sister     Migraines Brother     Cancer Father     Breast cancer Neg Hx     Ovarian cancer Neg Hx      Vital Signs:   /70 (BP Location: Left arm, Patient Position: Sitting, Cuff Size: Adult)   Pulse 68   Temp 98.6 °F (37 °C) (Temporal)   Resp 20   Ht 149.9 cm (59.02\")   Wt 73 kg (161 lb)   SpO2 98%   BMI 32.50 kg/m²      Physical Exam:  Physical Exam  Vitals reviewed.   Constitutional:       Appearance: Normal appearance.   Cardiovascular:      Rate and Rhythm: Normal rate.      Pulses: Normal pulses.   Pulmonary:      Effort: Pulmonary effort is normal.      Breath sounds: No wheezing.   Abdominal:      General: Abdomen is flat.   Skin:     General: Skin is warm.      Capillary Refill: Capillary refill takes less than 2 seconds.   Neurological:      General: No focal deficit present.      Mental Status: She is alert.   Psychiatric:         Mood and Affect: Mood normal.         Thought Content: Thought content normal.               PHQ-9 Score  PHQ-9 Total Score:       Lab Review  No visits with results within 3 Month(s) from this visit.   Latest known visit with results is:   Office Visit on 10/15/2024   Component Date Value Ref Range Status    Report Summary 10/15/2024 FINAL   Final    Comment: ====================================================================  [facility]  ====================================================================  Specimen Alert  Note:  Urinary creatinine is low; ability to detect some  drugs may be compromised.  Interpret results  with caution.  ====================================================================  Test                             Result       Flag       Units  Drug Present    Zolpidem Acid                  PRESENT     Zolpidem acid is an expected metabolite of " zolpidem.  ====================================================================  Test                      Result    Flag   Units      Ref Range    Creatinine              16        L      mg/dL      >=20  ====================================================================  Declared Medications:   Medication list was not provided.  ====================================================================  For clinical consultation, please call [phone]                           57.  ====================================================================          Radiology Results        Assessment / Plan         Assessment and Plan   Diagnoses and all orders for this visit:    1. Encounter for annual physical exam (Primary)  Assessment & Plan:  Annual wellness exam completed today. Health Maintenance including immunizations was updated and reflected in the chart. Yearly screening labs were ordered.     Further recommendations to be given once lab data received.     Health advice: healthy food choices with fresh fruits and vegetables, maintain sleep pattern at least 8 hours, avoid texting and distracted driving practices; wear safety belt, engage in regular exercise, maintain healthy weight, use safe sex practices, avoid alcohol and illicit drugs. Maintain immunizations that are up to date. Maintain health maintenance: Colon cancer screening, DEXA, Mammogram, PAP, etc.  Follow up with PCP if struggling with depression or anxiety. Keep regular dental and eye exams. Brush and floss teeth daily.     I suggest they take a daily multivitamin that is age-appropriate (example women's One-A-Day.)  Patient voiced understanding of these instructions.     Follow up Annually for Physical       Orders:  -     CBC & Differential; Future  -     Comprehensive Metabolic Panel; Future  -     Hemoglobin A1c; Future  -     Lipid Panel; Future  -     TSH Rfx On Abnormal To Free T4; Future  -     Vitamin D,25-Hydroxy; Future  -     Vitamin  B12; Future    2. Essential hypertension  Assessment & Plan:  Hypertension is stable and controlled  Continue current treatment regimen.  Medication changes per orders.  Regular aerobic exercise.  Ambulatory blood pressure monitoring.  Blood pressure will be reassessed in 3 months.   Continue Lisinopril 20 mg daily.     Orders:  -     Comprehensive Metabolic Panel; Future  -     Hemoglobin A1c; Future  -     Lipid Panel; Future    3. Other migraine without status migrainosus, not intractable  Assessment & Plan:  Getting very few migraines now and well controlled with Ubrelvy as needed.         4. Primary insomnia  Assessment & Plan:  Continue Ambien CR 6.25 mg nightly.     The patient has read and signed the [facility] Controlled Substance Contract.  I will continue to see patient for regular follow up appointments.  They are well controlled on their medication.  DARIUS is updated every 3 months. The patient is aware of the potential for addiction and dependence.        5. Gastroesophageal reflux disease without esophagitis  Assessment & Plan:  Improved with Nexium.     If worsened or resumes, will restart a PPI for 3 months.                     Discussed possible differential diagnoses, testing, treatment, recommended non-pharmacological interventions, risks, warning signs to monitor for that would indicate need for follow-up in clinic or ER. If no improvement with these regimens or you have new or worsening symptoms follow-up. Patient verbalizes understanding and agreement with plan of care. Denies further needs or concerns.     Patient was given instructions and counseling regarding her condition and for health maintenance advised.        Health Maintenance  Health Maintenance:   There are no preventive care reminders to display for this patient.         Meds ordered during this visit  No orders of the defined types were placed in this encounter.      Meds stopped during this visit:  Medications Discontinued  During This Encounter   Medication Reason    Semaglutide-Weight Management (Wegovy) 2.4 MG/0.75ML solution auto-injector *Therapy completed              Visit Diagnoses    ICD-10-CM ICD-9-CM   1. Encounter for annual physical exam  Z00.00 V70.0   2. Essential hypertension  I10 401.9   3. Other migraine without status migrainosus, not intractable  G43.809 346.80   4. Primary insomnia  F51.01 307.42   5. Gastroesophageal reflux disease without esophagitis  K21.9 530.81         Patient was given instructions and counseling regarding her condition or for health maintenance advice. Please see specific information pulled into the AVS if appropriate.     Follow Up   Return in about 3 months (around 4/17/2025) for followup insomnia .      This document has been electronically signed by Beth Dill DO   January 17, 2025 15:40 EST    Dictated Utilizing Dragon Dictation: Part of this note may be an electronic transcription/translation of spoken language to printed text using the Dragon Dictation System.    Beth Dill D.O.  [facility] [place]

## 2025-01-20 ENCOUNTER — LAB (OUTPATIENT)
Dept: LAB | Facility: HOSPITAL | Age: 60
End: 2025-01-20
Payer: COMMERCIAL

## 2025-01-20 DIAGNOSIS — Z76.0 MEDICATION REFILL: ICD-10-CM

## 2025-01-20 DIAGNOSIS — Z00.00 ENCOUNTER FOR ANNUAL PHYSICAL EXAM: ICD-10-CM

## 2025-01-20 DIAGNOSIS — I10 ESSENTIAL HYPERTENSION: ICD-10-CM

## 2025-01-20 LAB
25(OH)D3 SERPL-MCNC: 40.8 NG/ML (ref 30–100)
ALBUMIN SERPL-MCNC: 4.1 G/DL (ref 3.5–5.2)
ALBUMIN/GLOB SERPL: 1.4 G/DL
ALP SERPL-CCNC: 65 U/L (ref 39–117)
ALT SERPL W P-5'-P-CCNC: 24 U/L (ref 1–33)
ANION GAP SERPL CALCULATED.3IONS-SCNC: 8.5 MMOL/L (ref 5–15)
AST SERPL-CCNC: 25 U/L (ref 1–32)
BASOPHILS # BLD AUTO: 0.03 10*3/MM3 (ref 0–0.2)
BASOPHILS NFR BLD AUTO: 0.5 % (ref 0–1.5)
BILIRUB SERPL-MCNC: 0.7 MG/DL (ref 0–1.2)
BUN SERPL-MCNC: 13 MG/DL (ref 6–20)
BUN/CREAT SERPL: 16.9 (ref 7–25)
CALCIUM SPEC-SCNC: 9.9 MG/DL (ref 8.6–10.5)
CHLORIDE SERPL-SCNC: 105 MMOL/L (ref 98–107)
CHOLEST SERPL-MCNC: 157 MG/DL (ref 0–200)
CO2 SERPL-SCNC: 26.5 MMOL/L (ref 22–29)
CREAT SERPL-MCNC: 0.77 MG/DL (ref 0.57–1)
DEPRECATED RDW RBC AUTO: 42.6 FL (ref 37–54)
EGFRCR SERPLBLD CKD-EPI 2021: 89 ML/MIN/1.73
EOSINOPHIL # BLD AUTO: 0.11 10*3/MM3 (ref 0–0.4)
EOSINOPHIL NFR BLD AUTO: 1.9 % (ref 0.3–6.2)
ERYTHROCYTE [DISTWIDTH] IN BLOOD BY AUTOMATED COUNT: 13 % (ref 12.3–15.4)
GLOBULIN UR ELPH-MCNC: 2.9 GM/DL
GLUCOSE SERPL-MCNC: 82 MG/DL (ref 65–99)
HBA1C MFR BLD: 4.8 % (ref 4.8–5.6)
HCT VFR BLD AUTO: 45.1 % (ref 34–46.6)
HDLC SERPL-MCNC: 58 MG/DL (ref 40–60)
HGB BLD-MCNC: 15.4 G/DL (ref 12–15.9)
IMM GRANULOCYTES # BLD AUTO: 0.02 10*3/MM3 (ref 0–0.05)
IMM GRANULOCYTES NFR BLD AUTO: 0.4 % (ref 0–0.5)
LDLC SERPL CALC-MCNC: 85 MG/DL (ref 0–100)
LDLC/HDLC SERPL: 1.47 {RATIO}
LYMPHOCYTES # BLD AUTO: 1.56 10*3/MM3 (ref 0.7–3.1)
LYMPHOCYTES NFR BLD AUTO: 27.5 % (ref 19.6–45.3)
MCH RBC QN AUTO: 30.7 PG (ref 26.6–33)
MCHC RBC AUTO-ENTMCNC: 34.1 G/DL (ref 31.5–35.7)
MCV RBC AUTO: 90 FL (ref 79–97)
MONOCYTES # BLD AUTO: 0.54 10*3/MM3 (ref 0.1–0.9)
MONOCYTES NFR BLD AUTO: 9.5 % (ref 5–12)
NEUTROPHILS NFR BLD AUTO: 3.41 10*3/MM3 (ref 1.7–7)
NEUTROPHILS NFR BLD AUTO: 60.2 % (ref 42.7–76)
NRBC BLD AUTO-RTO: 0 /100 WBC (ref 0–0.2)
PLATELET # BLD AUTO: 268 10*3/MM3 (ref 140–450)
PMV BLD AUTO: 9.9 FL (ref 6–12)
POTASSIUM SERPL-SCNC: 5 MMOL/L (ref 3.5–5.2)
PROT SERPL-MCNC: 7 G/DL (ref 6–8.5)
RBC # BLD AUTO: 5.01 10*6/MM3 (ref 3.77–5.28)
SODIUM SERPL-SCNC: 140 MMOL/L (ref 136–145)
TRIGL SERPL-MCNC: 69 MG/DL (ref 0–150)
TSH SERPL DL<=0.05 MIU/L-ACNC: 1.28 UIU/ML (ref 0.27–4.2)
VIT B12 BLD-MCNC: 1119 PG/ML (ref 211–946)
VLDLC SERPL-MCNC: 14 MG/DL (ref 5–40)
WBC NRBC COR # BLD AUTO: 5.67 10*3/MM3 (ref 3.4–10.8)

## 2025-01-20 PROCEDURE — 82607 VITAMIN B-12: CPT

## 2025-01-20 PROCEDURE — 82306 VITAMIN D 25 HYDROXY: CPT

## 2025-01-20 PROCEDURE — 80061 LIPID PANEL: CPT

## 2025-01-20 PROCEDURE — 83036 HEMOGLOBIN GLYCOSYLATED A1C: CPT

## 2025-01-20 PROCEDURE — 80050 GENERAL HEALTH PANEL: CPT

## 2025-01-20 RX ORDER — MAGNESIUM GLUCONATE 27 MG(500)
500 TABLET ORAL 2 TIMES DAILY
Qty: 60 TABLET | Refills: 2 | Status: SHIPPED | OUTPATIENT
Start: 2025-01-20

## 2025-01-23 DIAGNOSIS — K21.9 GASTROESOPHAGEAL REFLUX DISEASE WITHOUT ESOPHAGITIS: Primary | ICD-10-CM

## 2025-01-23 RX ORDER — ESOMEPRAZOLE MAGNESIUM 40 MG/1
40 CAPSULE, DELAYED RELEASE ORAL
Qty: 90 CAPSULE | Refills: 0 | Status: SHIPPED | OUTPATIENT
Start: 2025-01-23

## 2025-02-17 DIAGNOSIS — I10 ESSENTIAL HYPERTENSION: ICD-10-CM

## 2025-02-18 RX ORDER — LISINOPRIL 20 MG/1
20 TABLET ORAL DAILY
Qty: 90 TABLET | Refills: 0 | Status: SHIPPED | OUTPATIENT
Start: 2025-02-18

## 2025-02-20 ENCOUNTER — TELEPHONE (OUTPATIENT)
Dept: FAMILY MEDICINE CLINIC | Facility: CLINIC | Age: 60
End: 2025-02-20
Payer: COMMERCIAL

## 2025-02-28 PROCEDURE — 87086 URINE CULTURE/COLONY COUNT: CPT | Performed by: FAMILY MEDICINE

## 2025-03-02 DIAGNOSIS — J30.2 SEASONAL ALLERGIES: ICD-10-CM

## 2025-03-03 RX ORDER — FEXOFENADINE HCL 180 MG/1
180 TABLET ORAL DAILY
Qty: 90 TABLET | Refills: 1 | Status: SHIPPED | OUTPATIENT
Start: 2025-03-03

## 2025-03-27 DIAGNOSIS — M54.50 ACUTE LOW BACK PAIN, UNSPECIFIED BACK PAIN LATERALITY, UNSPECIFIED WHETHER SCIATICA PRESENT: Primary | ICD-10-CM

## 2025-03-27 DIAGNOSIS — M25.551 RIGHT HIP PAIN: ICD-10-CM

## 2025-04-09 DIAGNOSIS — K21.9 GASTROESOPHAGEAL REFLUX DISEASE WITHOUT ESOPHAGITIS: ICD-10-CM

## 2025-04-09 RX ORDER — ESOMEPRAZOLE MAGNESIUM 40 MG/1
40 CAPSULE, DELAYED RELEASE ORAL
Qty: 90 CAPSULE | Refills: 0 | Status: SHIPPED | OUTPATIENT
Start: 2025-04-09

## 2025-04-17 DIAGNOSIS — F51.01 PRIMARY INSOMNIA: ICD-10-CM

## 2025-04-17 RX ORDER — ZOLPIDEM TARTRATE 6.25 MG/1
6.25 TABLET, FILM COATED, EXTENDED RELEASE ORAL NIGHTLY PRN
Qty: 30 TABLET | Refills: 2 | Status: SHIPPED | OUTPATIENT
Start: 2025-04-17

## 2025-05-02 ENCOUNTER — OFFICE VISIT (OUTPATIENT)
Dept: FAMILY MEDICINE CLINIC | Facility: CLINIC | Age: 60
End: 2025-05-02
Payer: COMMERCIAL

## 2025-05-02 VITALS
WEIGHT: 164 LBS | TEMPERATURE: 98.6 F | HEIGHT: 59 IN | OXYGEN SATURATION: 97 % | DIASTOLIC BLOOD PRESSURE: 64 MMHG | HEART RATE: 65 BPM | RESPIRATION RATE: 20 BRPM | BODY MASS INDEX: 33.06 KG/M2 | SYSTOLIC BLOOD PRESSURE: 124 MMHG

## 2025-05-02 DIAGNOSIS — K21.9 GASTROESOPHAGEAL REFLUX DISEASE WITHOUT ESOPHAGITIS: ICD-10-CM

## 2025-05-02 DIAGNOSIS — Z01.419 WELL WOMAN EXAM: ICD-10-CM

## 2025-05-02 DIAGNOSIS — I10 ESSENTIAL HYPERTENSION: ICD-10-CM

## 2025-05-02 DIAGNOSIS — F51.01 PRIMARY INSOMNIA: Primary | ICD-10-CM

## 2025-05-02 DIAGNOSIS — G43.809 OTHER MIGRAINE WITHOUT STATUS MIGRAINOSUS, NOT INTRACTABLE: ICD-10-CM

## 2025-05-02 RX ORDER — LISINOPRIL 10 MG/1
10 TABLET ORAL DAILY
Qty: 90 TABLET | Refills: 1 | Status: SHIPPED | OUTPATIENT
Start: 2025-05-02

## 2025-05-02 RX ORDER — ZOLPIDEM TARTRATE 6.25 MG/1
6.25 TABLET, FILM COATED, EXTENDED RELEASE ORAL NIGHTLY PRN
Qty: 30 TABLET | Refills: 2 | Status: SHIPPED | OUTPATIENT
Start: 2025-05-02

## 2025-05-02 NOTE — PROGRESS NOTES
Subjective     Chief Complaint  Insomnia    Subjective          Natalie Kan is a 60 y.o. female who presents today to [facility] for follow up.    HPI:   History of Present Illness    History of Present Illness  The patient is a 60-year-old female who presents today to follow up on insomnia, hypertension, migraine, right hip pain, and health maintenance.    Overall improvement in her condition since the last visit is reported. Sleep patterns have shown significant improvement, with more restful nights than not. This progress is attributed to the use of Ambien CR.    Blood pressure monitoring at home has been challenging due to a perceived inaccuracy in her device. Currently, she is on lisinopril 20 mg. In the clinic, her blood pressure was noted to be 124/64.    Headaches are infrequent, with only one recent episode. Initially, naproxen and Advil were used to manage the pain, but when the headache persisted, Ubrelvy was taken, which effectively alleviated the symptoms.    The use of Wegovy has been discontinued, and appetite is being managed by increasing water intake. No current issues with reflux are reported, and she is questioning the necessity of continuing her medication.    Physical therapy for her right hip has been beneficial. Initially informed that the typical recovery period for her condition is between 6 to 12 months, she believes recovery will be sooner. Supplementing therapy with additional exercises at home has been helpful. During the most recent therapy session, it was indicated that only six more sessions may be required.    A Pap smear has not been done recently due to her gynecologist's prison and the subsequent failure to reschedule. One instance of an abnormal Pap smear was recalled, which led to the initiation of estradiol cream treatment. Subsequent Pap smears have been normal, and the use of estradiol cream has since been  discontinued.    FAMILY HISTORY  Both of her parents passed away early.    Her past medical history includes hypertension, migraine, insomnia.     The following portions of the patient's history were reviewed and updated as appropriate: allergies, current medications, past family history, past medical history, past social history, past surgical history and problem list.    Objective     Objective     Allergy:   Allergies   Allergen Reactions    Augmentin [Amoxicillin-Pot Clavulanate] GI Intolerance        Current Medications:   Current Outpatient Medications   Medication Sig Dispense Refill    calcium carbonate (OS-TORREY) 600 MG tablet Take 1 tablet by mouth Daily.      esomeprazole (nexIUM) 40 MG capsule Take 1 capsule by mouth Every Morning Before Breakfast. 90 capsule 0    fexofenadine (ALLEGRA) 180 MG tablet Take 1 tablet by mouth Daily. 90 tablet 1    fluticasone (FLONASE) 50 MCG/ACT nasal spray Place 1 spray into the nostril(s) as directed by provider Daily. 48 g 5    lisinopril (PRINIVIL,ZESTRIL) 10 MG tablet Take 1 tablet by mouth Daily. 90 tablet 1    magnesium gluconate (MAGONATE) 500 MG tablet Take 1 tablet by mouth 2 (Two) Times a Day. 60 tablet 2    Multiple Vitamins-Minerals (MULTIVITAMIN ADULT PO) Take  by mouth.      Ubrelvy 100 MG tablet Take 1 tablet by mouth Daily As Needed for migraine. may repeat in 2 hours if needed. max 200 mg per 24 hours 16 tablet 11    zolpidem CR (Ambien CR) 6.25 MG CR tablet Take 1 tablet by mouth At Night As Needed for Sleep. 30 tablet 2     No current facility-administered medications for this visit.       Past Medical History:  Past Medical History:   Diagnosis Date    Abnormal Pap smear of cervix     ASCUS    Contraception management     Nml PAP with HPV neg 2013    Dysfunctional uterine bleeding     Eczema     History of prior pregnancies     A1    Hypertension     Malignant neoplasm     SQUAMOUS     Migraine     Non-healing skin lesion     SOB (shortness of  "breath)     Uterine leiomyoma        Past Surgical History:  Past Surgical History:   Procedure Laterality Date    APPENDECTOMY  1986     SECTION       SECTION      MOHS SURGERY      micrographic surgery face right side - due to squamous cell carcinoma    WISDOM TOOTH EXTRACTION         Social History:  Social History     Socioeconomic History    Marital status:    Tobacco Use    Smoking status: Never     Passive exposure: Never    Smokeless tobacco: Never   Vaping Use    Vaping status: Never Used   Substance and Sexual Activity    Alcohol use: No    Drug use: No    Sexual activity: Yes     Partners: Male     Birth control/protection: Post-menopausal       Family History:  Family History   Problem Relation Age of Onset    Migraines Other     Skin cancer Other     Cancer Other     Hypertension Other     Migraines Mother     Cancer Mother         Malignant Neoplasm of left eye/ Renal cell cancer    Hypertension Sister     Migraines Brother     Cancer Father     Breast cancer Neg Hx     Ovarian cancer Neg Hx        Vital Signs:   /64 (BP Location: Right arm, Patient Position: Sitting, Cuff Size: Adult)   Pulse 65   Temp 98.6 °F (37 °C) (Temporal)   Resp 20   Ht 149.9 cm (59.02\")   Wt 74.4 kg (164 lb)   SpO2 97%   BMI 33.11 kg/m²      Physical Exam:  Physical Exam  Vitals reviewed.   Constitutional:       Appearance: She is not ill-appearing.   Cardiovascular:      Rate and Rhythm: Normal rate.      Pulses: Normal pulses.   Pulmonary:      Effort: Pulmonary effort is normal.      Breath sounds: Normal breath sounds.   Neurological:      General: No focal deficit present.      Mental Status: She is alert. Mental status is at baseline.   Psychiatric:         Mood and Affect: Mood normal.         Behavior: Behavior normal.         Thought Content: Thought content normal.         Judgment: Judgment normal.               PHQ-9 Score  PHQ-9 Total Score:      Lab Review  Admission on " 02/28/2025, Discharged on 02/28/2025   Component Date Value Ref Range Status    Color 02/28/2025 Straw   Final    Clarity, UA 02/28/2025 Clear   Final    Glucose, UA 02/28/2025 Negative  mg/dL Final    Bilirubin 02/28/2025 Negative   Final    Ketones, UA 02/28/2025 Negative   Final    Specific Gravity  02/28/2025 1.010  1.005 - 1.030 Final    Blood, UA 02/28/2025 Negative   Final    pH, Urine 02/28/2025 7.0  5.0 - 8.0 Final    Protein, POC 02/28/2025 Negative  mg/dL Final    Urobilinogen, UA 02/28/2025 Normal   Final    Nitrite, UA 02/28/2025 Negative   Final    Leukocytes 02/28/2025 Negative   Final    Urine Culture 02/28/2025 No growth   Final        Radiology Results        Assessment / Plan         Assessment and Plan   Diagnoses and all orders for this visit:    1. Primary insomnia (Primary)  -     zolpidem CR (Ambien CR) 6.25 MG CR tablet; Take 1 tablet by mouth At Night As Needed for Sleep.  Dispense: 30 tablet; Refill: 2    2. Essential hypertension  -     lisinopril (PRINIVIL,ZESTRIL) 10 MG tablet; Take 1 tablet by mouth Daily.  Dispense: 90 tablet; Refill: 1    3. Gastroesophageal reflux disease without esophagitis    4. Other migraine without status migrainosus, not intractable    5. Well woman exam  -     Ambulatory Referral to Gynecology        Assessment & Plan  1. Insomnia.  - Sleep has shown improvement with the use of Ambien CR.  - Continued release of Ambien CR is helping better.  - Advised to continue with the current regimen of Ambien CR.  - The patient has read and signed the [facility] Controlled Substance Contract.  I will continue to see patient for regular follow up appointments.  They are well controlled on their medication.  DARIUS is updated every 3 months. The patient is aware of the potential for addiction and dependence.     2. Hypertension.  - Blood pressure readings are within the normal range, although the diastolic value is slightly low.  - Blood pressure reading today is  124/64.  - Advised to reduce the dosage of lisinopril to 10 mg daily and acquire a new blood pressure monitor for home use.  - If blood pressure increases, the lisinopril dosage will be adjusted back to 20 mg.    3. Migraine.  - Experienced a migraine recently and used Ubrelvy, which provided relief.  - No frequent headaches reported.  - Advised to continue using Ubrelvy as needed for migraines and to minimize the use of naproxen and Advil.  - Encouraged to use Ubrelvy for migraines instead of multiple doses of naproxen.    4. Right hip pain.  - Undergoing physical therapy for her right hip, showing significant improvement.  - Physical therapy sessions are progressing well, with only six more sessions anticipated.  - Advised to continue with physical therapy sessions and home exercises.  - Noted faster improvement than initially expected.    5. Health maintenance.  - Weight remains stable.  - Mammogram is valid until 09/2025, and colonoscopy results are satisfactory.  - Referral for a Pap smear will be provided.  - Advised to receive the pneumonia vaccine, either today or at a later date if preferred.    6. Gastroesophageal reflux disease.  - Reports no current issues with reflux.  - Advised to take reflux medication as needed.  - If medication is discontinued for a few weeks and heartburn occurs, advised to resume the medication for a week or two before stopping again.    Follow-up  - Follow up in 3 months.      Discussed possible differential diagnoses, testing, treatment, recommended non-pharmacological interventions, risks, warning signs to monitor for that would indicate need for follow-up in clinic or ER. If no improvement with these regimens or you have new or worsening symptoms follow-up. Patient verbalizes understanding and agreement with plan of care. Denies further needs or concerns.     Patient was given instructions and counseling regarding her condition and for health maintenance advised.             Health Maintenance  Health Maintenance:   There are no preventive care reminders to display for this patient.       Meds ordered during this visit  New Medications Ordered This Visit   Medications    zolpidem CR (Ambien CR) 6.25 MG CR tablet     Sig: Take 1 tablet by mouth At Night As Needed for Sleep.     Dispense:  30 tablet     Refill:  2    lisinopril (PRINIVIL,ZESTRIL) 10 MG tablet     Sig: Take 1 tablet by mouth Daily.     Dispense:  90 tablet     Refill:  1       Meds stopped during this visit:  Medications Discontinued During This Encounter   Medication Reason    estradiol (Vagifem) 10 MCG tablet vaginal tablet     lisinopril (PRINIVIL,ZESTRIL) 20 MG tablet Reorder    zolpidem CR (Ambien CR) 6.25 MG CR tablet Reorder        Visit Diagnoses    ICD-10-CM ICD-9-CM   1. Primary insomnia  F51.01 307.42   2. Essential hypertension  I10 401.9   3. Gastroesophageal reflux disease without esophagitis  K21.9 530.81   4. Other migraine without status migrainosus, not intractable  G43.809 346.80   5. Well woman exam  Z01.419 V72.31       Patient was given instructions and counseling regarding her condition or for health maintenance advice. Please see specific information pulled into the AVS if appropriate.     Follow Up   Return in about 3 months (around 8/2/2025) for followup Insomnia .      Patient or patient representative verbalized consent for the use of Ambient Listening during the visit with  Beth Dill DO for chart documentation. 5/2/2025  15:35 EDT      This document has been electronically signed by Beth Dill DO   May 2, 2025 15:58 EDT    Dictated Utilizing Dragon Dictation: Part of this note may be an electronic transcription/translation of spoken language to printed text using the Dragon Dictation System.    Beth Dill D.O.  [facility] [place] [facility] [place]

## 2025-06-19 RX ORDER — LISINOPRIL 20 MG/1
20 TABLET ORAL DAILY
Qty: 90 TABLET | Refills: 1 | Status: SHIPPED | OUTPATIENT
Start: 2025-06-19

## 2025-07-09 RX ORDER — MOXIFLOXACIN 5 MG/ML
1 SOLUTION/ DROPS OPHTHALMIC 3 TIMES DAILY
Qty: 3 ML | Refills: 0 | Status: SHIPPED | OUTPATIENT
Start: 2025-07-09 | End: 2025-07-09 | Stop reason: SDUPTHER

## 2025-07-10 RX ORDER — MOXIFLOXACIN 5 MG/ML
1 SOLUTION/ DROPS OPHTHALMIC 3 TIMES DAILY
Qty: 3 ML | Refills: 0 | Status: SHIPPED | OUTPATIENT
Start: 2025-07-10

## 2025-07-19 DIAGNOSIS — J30.2 SEASONAL ALLERGIES: ICD-10-CM

## 2025-07-21 RX ORDER — FLUTICASONE PROPIONATE 50 MCG
1 SPRAY, SUSPENSION (ML) NASAL DAILY
Qty: 48 G | Refills: 5 | Status: SHIPPED | OUTPATIENT
Start: 2025-07-21

## 2025-08-04 PROBLEM — Z01.419 WELL WOMAN EXAM: Status: ACTIVE | Noted: 2025-08-04

## 2025-08-05 ENCOUNTER — OFFICE VISIT (OUTPATIENT)
Dept: OBSTETRICS AND GYNECOLOGY | Facility: CLINIC | Age: 60
End: 2025-08-05
Payer: COMMERCIAL

## 2025-08-05 VITALS
DIASTOLIC BLOOD PRESSURE: 78 MMHG | RESPIRATION RATE: 14 BRPM | WEIGHT: 168 LBS | SYSTOLIC BLOOD PRESSURE: 126 MMHG | BODY MASS INDEX: 33.91 KG/M2

## 2025-08-05 DIAGNOSIS — Z71.85 VACCINE COUNSELING: ICD-10-CM

## 2025-08-05 DIAGNOSIS — Z01.419 WELL WOMAN EXAM: Primary | ICD-10-CM

## 2025-08-05 DIAGNOSIS — M85.89 OSTEOPENIA OF MULTIPLE SITES: ICD-10-CM

## 2025-08-05 PROBLEM — K21.9 GASTROESOPHAGEAL REFLUX DISEASE WITHOUT ESOPHAGITIS: Status: RESOLVED | Noted: 2025-01-17 | Resolved: 2025-08-05

## 2025-08-05 PROBLEM — R06.02 SHORTNESS OF BREATH: Status: RESOLVED | Noted: 2023-06-07 | Resolved: 2025-08-05

## 2025-08-05 PROBLEM — M85.852 OSTEOPENIA OF NECK OF LEFT FEMUR: Status: RESOLVED | Noted: 2020-07-17 | Resolved: 2025-08-05

## 2025-08-05 PROBLEM — K59.00 CONSTIPATION: Status: RESOLVED | Noted: 2024-10-15 | Resolved: 2025-08-05

## 2025-08-05 PROBLEM — R07.89 ATYPICAL CHEST PAIN: Status: RESOLVED | Noted: 2023-06-07 | Resolved: 2025-08-05

## 2025-08-05 PROBLEM — G43.909 MIGRAINE: Status: RESOLVED | Noted: 2023-06-07 | Resolved: 2025-08-05

## 2025-08-05 PROCEDURE — 99396 PREV VISIT EST AGE 40-64: CPT | Performed by: OBSTETRICS & GYNECOLOGY

## 2025-08-08 ENCOUNTER — PATIENT ROUNDING (BHMG ONLY) (OUTPATIENT)
Dept: OBSTETRICS AND GYNECOLOGY | Facility: CLINIC | Age: 60
End: 2025-08-08
Payer: COMMERCIAL

## 2025-08-08 ENCOUNTER — OFFICE VISIT (OUTPATIENT)
Dept: FAMILY MEDICINE CLINIC | Facility: CLINIC | Age: 60
End: 2025-08-08
Payer: COMMERCIAL

## 2025-08-08 VITALS
WEIGHT: 170 LBS | SYSTOLIC BLOOD PRESSURE: 132 MMHG | DIASTOLIC BLOOD PRESSURE: 80 MMHG | HEART RATE: 86 BPM | BODY MASS INDEX: 34.27 KG/M2 | HEIGHT: 59 IN | TEMPERATURE: 99.5 F

## 2025-08-08 DIAGNOSIS — I10 ESSENTIAL HYPERTENSION: ICD-10-CM

## 2025-08-08 DIAGNOSIS — Z12.31 ENCOUNTER FOR SCREENING MAMMOGRAM FOR MALIGNANT NEOPLASM OF BREAST: ICD-10-CM

## 2025-08-08 DIAGNOSIS — F51.01 PRIMARY INSOMNIA: Primary | ICD-10-CM

## 2025-08-08 DIAGNOSIS — J30.2 SEASONAL ALLERGIES: ICD-10-CM

## 2025-08-08 DIAGNOSIS — M85.89 OSTEOPENIA OF MULTIPLE SITES: ICD-10-CM

## 2025-08-08 RX ORDER — ZOLPIDEM TARTRATE 6.25 MG/1
6.25 TABLET, FILM COATED, EXTENDED RELEASE ORAL NIGHTLY PRN
Qty: 30 TABLET | Refills: 2 | Status: SHIPPED | OUTPATIENT
Start: 2025-08-08

## 2025-08-08 RX ORDER — FEXOFENADINE HCL 180 MG/1
180 TABLET ORAL DAILY
Qty: 90 TABLET | Refills: 1 | Status: SHIPPED | OUTPATIENT
Start: 2025-08-08

## 2025-08-08 RX ORDER — LISINOPRIL 20 MG/1
20 TABLET ORAL DAILY
Qty: 90 TABLET | Refills: 1 | Status: SHIPPED | OUTPATIENT
Start: 2025-08-08